# Patient Record
Sex: MALE | Race: WHITE | URBAN - METROPOLITAN AREA
[De-identification: names, ages, dates, MRNs, and addresses within clinical notes are randomized per-mention and may not be internally consistent; named-entity substitution may affect disease eponyms.]

---

## 2019-07-10 ENCOUNTER — EMERGENCY (EMERGENCY)
Facility: HOSPITAL | Age: 49
LOS: 0 days | Discharge: HOME | End: 2019-07-10
Attending: EMERGENCY MEDICINE | Admitting: EMERGENCY MEDICINE
Payer: MEDICAID

## 2019-07-10 VITALS
OXYGEN SATURATION: 98 % | SYSTOLIC BLOOD PRESSURE: 111 MMHG | TEMPERATURE: 98 F | RESPIRATION RATE: 20 BRPM | DIASTOLIC BLOOD PRESSURE: 64 MMHG | HEART RATE: 100 BPM

## 2019-07-10 VITALS — OXYGEN SATURATION: 99 % | RESPIRATION RATE: 20 BRPM
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
## 2019-07-10 DIAGNOSIS — R07.89 OTHER CHEST PAIN: ICD-10-CM
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
## 2019-07-10 DIAGNOSIS — Z76.5 MALINGERER [CONSCIOUS SIMULATION]: ICD-10-CM

## 2019-07-10 DIAGNOSIS — R07.9 CHEST PAIN, UNSPECIFIED: ICD-10-CM

## 2019-07-10 LAB
ANION GAP SERPL CALC-SCNC: 11 MMOL/L — SIGNIFICANT CHANGE UP (ref 7–14)
BASOPHILS # BLD AUTO: 0.03 K/UL — SIGNIFICANT CHANGE UP (ref 0–0.2)
BASOPHILS NFR BLD AUTO: 0.4 % — SIGNIFICANT CHANGE UP (ref 0–1)
BUN SERPL-MCNC: 14 MG/DL — SIGNIFICANT CHANGE UP (ref 10–20)
CALCIUM SERPL-MCNC: 9.4 MG/DL — SIGNIFICANT CHANGE UP (ref 8.5–10.1)
CHLORIDE SERPL-SCNC: 104 MMOL/L — SIGNIFICANT CHANGE UP (ref 98–110)
CO2 SERPL-SCNC: 26 MMOL/L — SIGNIFICANT CHANGE UP (ref 17–32)
CREAT SERPL-MCNC: 1 MG/DL — SIGNIFICANT CHANGE UP (ref 0.7–1.5)
EOSINOPHIL # BLD AUTO: 0.25 K/UL — SIGNIFICANT CHANGE UP (ref 0–0.7)
EOSINOPHIL NFR BLD AUTO: 3.4 % — SIGNIFICANT CHANGE UP (ref 0–8)
GLUCOSE SERPL-MCNC: 97 MG/DL — SIGNIFICANT CHANGE UP (ref 70–99)
HCT VFR BLD CALC: 41.8 % — LOW (ref 42–52)
HGB BLD-MCNC: 14.5 G/DL — SIGNIFICANT CHANGE UP (ref 14–18)
IMM GRANULOCYTES NFR BLD AUTO: 0.4 % — HIGH (ref 0.1–0.3)
LYMPHOCYTES # BLD AUTO: 1.59 K/UL — SIGNIFICANT CHANGE UP (ref 1.2–3.4)
LYMPHOCYTES # BLD AUTO: 21.8 % — SIGNIFICANT CHANGE UP (ref 20.5–51.1)
MCHC RBC-ENTMCNC: 31.8 PG — HIGH (ref 27–31)
MCHC RBC-ENTMCNC: 34.7 G/DL — SIGNIFICANT CHANGE UP (ref 32–37)
MCV RBC AUTO: 91.7 FL — SIGNIFICANT CHANGE UP (ref 80–94)
MONOCYTES # BLD AUTO: 0.7 K/UL — HIGH (ref 0.1–0.6)
MONOCYTES NFR BLD AUTO: 9.6 % — HIGH (ref 1.7–9.3)
NEUTROPHILS # BLD AUTO: 4.71 K/UL — SIGNIFICANT CHANGE UP (ref 1.4–6.5)
NEUTROPHILS NFR BLD AUTO: 64.4 % — SIGNIFICANT CHANGE UP (ref 42.2–75.2)
NRBC # BLD: 0 /100 WBCS — SIGNIFICANT CHANGE UP (ref 0–0)
PLATELET # BLD AUTO: 147 K/UL — SIGNIFICANT CHANGE UP (ref 130–400)
POTASSIUM SERPL-MCNC: 3.8 MMOL/L — SIGNIFICANT CHANGE UP (ref 3.5–5)
POTASSIUM SERPL-SCNC: 3.8 MMOL/L — SIGNIFICANT CHANGE UP (ref 3.5–5)
RBC # BLD: 4.56 M/UL — LOW (ref 4.7–6.1)
RBC # FLD: 13.3 % — SIGNIFICANT CHANGE UP (ref 11.5–14.5)
SODIUM SERPL-SCNC: 141 MMOL/L — SIGNIFICANT CHANGE UP (ref 135–146)
TROPONIN T SERPL-MCNC: <0.01 NG/ML — SIGNIFICANT CHANGE UP
WBC # BLD: 7.31 K/UL — SIGNIFICANT CHANGE UP (ref 4.8–10.8)
WBC # FLD AUTO: 7.31 K/UL — SIGNIFICANT CHANGE UP (ref 4.8–10.8)

## 2019-07-10 PROCEDURE — 71260 CT THORAX DX C+: CPT | Mod: 26

## 2019-07-10 PROCEDURE — 99285 EMERGENCY DEPT VISIT HI MDM: CPT

## 2019-07-10 PROCEDURE — 93010 ELECTROCARDIOGRAM REPORT: CPT

## 2019-07-10 RX ORDER — OXYCODONE AND ACETAMINOPHEN 5; 325 MG/1; MG/1
1 TABLET ORAL ONCE
Refills: 0 | Status: DISCONTINUED | OUTPATIENT
Start: 2019-07-10 | End: 2019-07-10

## 2019-07-10 RX ORDER — MORPHINE SULFATE 50 MG/1
4 CAPSULE, EXTENDED RELEASE ORAL ONCE
Refills: 0 | Status: DISCONTINUED | OUTPATIENT
Start: 2019-07-10 | End: 2019-07-10

## 2019-07-10 RX ADMIN — MORPHINE SULFATE 4 MILLIGRAM(S): 50 CAPSULE, EXTENDED RELEASE ORAL at 18:45

## 2019-07-10 NOTE — ED PROVIDER NOTE - CARE PLAN
Principal Discharge DX:	Chest wall pain Principal Discharge DX:	Chest wall pain  Secondary Diagnosis:	Drug-seeking behavior
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
## 2019-07-10 NOTE — ED PROVIDER NOTE - NSFOLLOWUPINSTRUCTIONS_ED_ALL_ED_FT
Patient to be discharged from ED. Any available test results were discussed with patient and/or family. Verbal instructions given, including instructions to return to ED immediately for any new, worsening, or concerning symptoms. Patient endorsed understanding. Written discharge instructions additionally given, including follow-up plan.    Chest Wall Pain    Chest wall pain is pain in or around the bones and muscles of your chest. Sometimes, an injury causes this pain. Sometimes, the cause may not be known. This pain may take several weeks or longer to get better.     HOME CARE INSTRUCTIONS  Pay attention to any changes in your symptoms. Take these actions to help with your pain:     Rest as told by your health care provider.    Avoid activities that cause pain. These include any activities that use your chest muscles or your abdominal and side muscles to lift heavy items.     If directed, apply ice to the painful area:  Put ice in a plastic bag.  Place a towel between your skin and the bag.  Leave the ice on for 20 minutes, 2–3 times per day.  Take over-the-counter and prescription medicines only as told by your health care provider.  Do not use tobacco products, including cigarettes, chewing tobacco, and e-cigarettes. If you need help quitting, ask your health care provider.  Keep all follow-up visits as told by your health care provider. This is important.    SEEK MEDICAL CARE IF:  You have a fever.  Your chest pain becomes worse.  You have new symptoms.    SEEK IMMEDIATE MEDICAL CARE IF:  You have nausea or vomiting.  You feel sweaty or light-headed.  You have a cough with phlegm (sputum) or you cough up blood.  You develop shortness of breath.    ADDITIONAL NOTES AND INSTRUCTIONS    Please follow up with your Primary MD in 24-48 hr.  Seek immediate medical care for any new/worsening signs or symptoms.
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
## 2019-07-10 NOTE — ED PROVIDER NOTE - NS ED ROS FT
Constitutional: See HPI. No fever/chills.  Eyes: No visual changes, eye pain or discharge.  ENT: No hearing changes, pain, discharge or infections.  Neck: No neck pain or stiffness.  Cardiac:+ chest pain, sob with exertion  Respiratory: No cough or respiratory distress. No hemoptysis.   GI: No nausea, vomiting, diarrhea or abdominal pain.  : No dysuria, frequency or burning.   MS: No myalgia, muscle weakness, joint pain or back pain.  Neuro: No headache or weakness. No LOC.  Skin: No rash.  Except as documented in the HPI, all other systems are negative.

## 2019-07-10 NOTE — ED PROVIDER NOTE - PHYSICAL EXAMINATION
VITAL SIGNS: I have reviewed nursing notes and confirm.  CONSTITUTIONAL: Well-developed; well-nourished; in no acute distress.  SKIN: Skin exam is warm and dry, no acute rash.  HEAD: Normocephalic; atraumatic.  NECK: Supple; non tender.  CARD: S1, S2 normal; no murmurs, gallops, or rubs. Regular rate and rhythm. + anterior /sternal chest wall ttp   RESP: No wheezes, rales or rhonchi.  ABD: Normal bowel sounds; soft; non-distended; non-tender  EXT: Normal ROM. No clubbing, cyanosis or edema.  LYMPH: No acute cervical adenopathy.  NEURO: Alert, oriented. Grossly unremarkable. No focal deficits.  PSYCH: Cooperative, appropriate.

## 2019-07-10 NOTE — ED PROVIDER NOTE - NSFOLLOWUPCLINICS_GEN_ALL_ED_FT
[facility]  Medicine  [address], [place]   Phone: [phone]  Fax:   Follow Up Time:

## 2019-07-11 ENCOUNTER — EMERGENCY (EMERGENCY)
Facility: HOSPITAL | Age: 49
LOS: 0 days | Discharge: HOME | End: 2019-07-12
Attending: EMERGENCY MEDICINE | Admitting: EMERGENCY MEDICINE
Payer: MEDICAID

## 2019-07-11 VITALS
TEMPERATURE: 96 F | DIASTOLIC BLOOD PRESSURE: 81 MMHG | SYSTOLIC BLOOD PRESSURE: 127 MMHG | RESPIRATION RATE: 18 BRPM | HEART RATE: 97 BPM | OXYGEN SATURATION: 100 %

## 2019-07-11 DIAGNOSIS — R44.0 AUDITORY HALLUCINATIONS: ICD-10-CM

## 2019-07-11 DIAGNOSIS — F60.2 ANTISOCIAL PERSONALITY DISORDER: ICD-10-CM

## 2019-07-11 PROBLEM — Z00.00 ENCOUNTER FOR PREVENTIVE HEALTH EXAMINATION: Status: ACTIVE | Noted: 2019-07-11

## 2019-07-11 PROCEDURE — 99284 EMERGENCY DEPT VISIT MOD MDM: CPT

## 2019-07-11 NOTE — ED ADULT TRIAGE NOTE - CHIEF COMPLAINT QUOTE
"I'm Schizophrenia and Bipolar. I see the light and talk to it. I hear voices too." denies any homicidal/suicidal ideation. denies any drugs use.

## 2019-07-12 DIAGNOSIS — F60.2 ANTISOCIAL PERSONALITY DISORDER: ICD-10-CM

## 2019-07-12 LAB
ANION GAP SERPL CALC-SCNC: 15 MMOL/L — HIGH (ref 7–14)
APAP SERPL-MCNC: <5 UG/ML — LOW (ref 10–30)
BASOPHILS # BLD AUTO: 0.04 K/UL — SIGNIFICANT CHANGE UP (ref 0–0.2)
BASOPHILS NFR BLD AUTO: 0.6 % — SIGNIFICANT CHANGE UP (ref 0–1)
BUN SERPL-MCNC: 11 MG/DL — SIGNIFICANT CHANGE UP (ref 10–20)
CALCIUM SERPL-MCNC: 9.2 MG/DL — SIGNIFICANT CHANGE UP (ref 8.5–10.1)
CHLORIDE SERPL-SCNC: 105 MMOL/L — SIGNIFICANT CHANGE UP (ref 98–110)
CO2 SERPL-SCNC: 23 MMOL/L — SIGNIFICANT CHANGE UP (ref 17–32)
CREAT SERPL-MCNC: 1 MG/DL — SIGNIFICANT CHANGE UP (ref 0.7–1.5)
EOSINOPHIL # BLD AUTO: 0.46 K/UL — SIGNIFICANT CHANGE UP (ref 0–0.7)
EOSINOPHIL NFR BLD AUTO: 6.6 % — SIGNIFICANT CHANGE UP (ref 0–8)
ETHANOL SERPL-MCNC: 120 MG/DL — HIGH
GLUCOSE SERPL-MCNC: 78 MG/DL — SIGNIFICANT CHANGE UP (ref 70–99)
HCT VFR BLD CALC: 39.9 % — LOW (ref 42–52)
HGB BLD-MCNC: 14.1 G/DL — SIGNIFICANT CHANGE UP (ref 14–18)
IMM GRANULOCYTES NFR BLD AUTO: 0.3 % — SIGNIFICANT CHANGE UP (ref 0.1–0.3)
LYMPHOCYTES # BLD AUTO: 2 K/UL — SIGNIFICANT CHANGE UP (ref 1.2–3.4)
LYMPHOCYTES # BLD AUTO: 28.7 % — SIGNIFICANT CHANGE UP (ref 20.5–51.1)
MCHC RBC-ENTMCNC: 32.3 PG — HIGH (ref 27–31)
MCHC RBC-ENTMCNC: 35.3 G/DL — SIGNIFICANT CHANGE UP (ref 32–37)
MCV RBC AUTO: 91.5 FL — SIGNIFICANT CHANGE UP (ref 80–94)
MONOCYTES # BLD AUTO: 0.58 K/UL — SIGNIFICANT CHANGE UP (ref 0.1–0.6)
MONOCYTES NFR BLD AUTO: 8.3 % — SIGNIFICANT CHANGE UP (ref 1.7–9.3)
NEUTROPHILS # BLD AUTO: 3.88 K/UL — SIGNIFICANT CHANGE UP (ref 1.4–6.5)
NEUTROPHILS NFR BLD AUTO: 55.5 % — SIGNIFICANT CHANGE UP (ref 42.2–75.2)
NRBC # BLD: 0 /100 WBCS — SIGNIFICANT CHANGE UP (ref 0–0)
PLATELET # BLD AUTO: 148 K/UL — SIGNIFICANT CHANGE UP (ref 130–400)
POTASSIUM SERPL-MCNC: 3.3 MMOL/L — LOW (ref 3.5–5)
POTASSIUM SERPL-SCNC: 3.3 MMOL/L — LOW (ref 3.5–5)
RBC # BLD: 4.36 M/UL — LOW (ref 4.7–6.1)
RBC # FLD: 13.2 % — SIGNIFICANT CHANGE UP (ref 11.5–14.5)
SALICYLATES SERPL-MCNC: <0.3 MG/DL — LOW (ref 4–30)
SODIUM SERPL-SCNC: 143 MMOL/L — SIGNIFICANT CHANGE UP (ref 135–146)
WBC # BLD: 6.98 K/UL — SIGNIFICANT CHANGE UP (ref 4.8–10.8)
WBC # FLD AUTO: 6.98 K/UL — SIGNIFICANT CHANGE UP (ref 4.8–10.8)

## 2019-07-12 PROCEDURE — 93010 ELECTROCARDIOGRAM REPORT: CPT

## 2019-07-12 PROCEDURE — 90792 PSYCH DIAG EVAL W/MED SRVCS: CPT | Mod: GT
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
## 2019-07-12 NOTE — ED ADULT NURSE NOTE - OBJECTIVE STATEMENT
Pt is complaining of hearing voices and being paranoid. Pt has a past medical history of schizophrenia.  Denies suicidal or homicidal ideations.
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
## 2019-07-12 NOTE — ED BEHAVIORAL HEALTH ASSESSMENT NOTE - CURRENT MEDICATION
Received injection a few days ago, thinks it's once a week Received injection a few days ago, thinks it's once a week or a month

## 2019-07-12 NOTE — ED PROVIDER NOTE - OBJECTIVE STATEMENT
48 yold male to Ed Pmhx schizophrenia, asthma c/o hearing voices, paranoid ideations - believes someone is trying to get him; pt admits to prior hospitalizations - last admission in [place] 1 [place] ago; currently denies sucidal ideations, homicidal ideations ideations; pt currently lives with sister - denies drug use currently(last used 20 yrs ago); pt admits to drinking a few beers; pt denies headache, n/v, fever,chills, abdominal pain/back pain

## 2019-07-12 NOTE — ED PROVIDER NOTE - PROGRESS NOTE DETAILS
case d/w telepsch resident - will place pt in list for interview; I spoke with tele psychiatrist Dr. Soni? She states that she evaluated patient and believes pt does not require inpatient management. States that pt's story has a lot of inconsistencies and pt might have a malingering component. Pt to be d/brandon with outpt psychiatric follow up.
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
## 2019-07-12 NOTE — ED PROVIDER NOTE - CLINICAL SUMMARY MEDICAL DECISION MAKING FREE TEXT BOX
Pt with hx of schizophrenia and bipolar presents with complaints of hearing voices. No SI/HI. Medically cleared and evaluated by psychiatrist and psychiatrically cleared. Plan is d/c with outpt f/up.
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
## 2019-07-12 NOTE — ED BEHAVIORAL HEALTH ASSESSMENT NOTE - OTHER
Web Search n/a superficially cooperative, defensive, guarded deferred swaying, picking at skin "I don't know" vague, contradicts himself, withholding information

## 2019-07-12 NOTE — ED PROVIDER NOTE - PHYSICAL EXAMINATION
Constitutional: Well developed, well nourished. NAD  Head: Normocephalic, atraumatic.  Eyes: PERRL, EOMI.  ENT: No nasal discharge. Mucous membranes dry.  Neck: Supple. Painless ROM.  Cardiovascular:  Regular rate and rhythm.   Pulmonary:  Lungs clear to auscultation bilaterally.   Abdominal: Soft. Nondistended. No rebound, guarding, rigidity.  Extremities. Pelvis stable. No lower extremity edema, symmetric calves.  Skin: No rashes, cyanosis.  Neuro: AAOx3. No focal neurological deficits.  Psych: pt alert and oriented; preoccupied with internal stimuli; + auditory hallucinations as per pt;
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
## 2019-07-12 NOTE — ED PROVIDER NOTE - ATTENDING CONTRIBUTION TO CARE
I personally evaluated the patient. I reviewed the Resident’s or Physician Assistant’s note (as assigned above), and agree with the findings and plan except as documented in my note.  Pt with hx of schizophrenia and bipolar d/o presents with complaints of hearing voices. States that it has been ongoing "for a while". States that he lived in [place] and moved to [place] 3 months ago and is living with his sister. The voices are not directing him to do anything. Denies SI/HI. No medical complaints. States that he drank  some alcohol and is denying drug use. VS reviewed, pt well appearing, NAD. Head ncat, talking in full sentences, neck supple, no JVD, normal s1s2 without any murmurs, Lungs CTAB with normal work of breathing. abd +BS, s/nd/nt, extremities wnl, AAO x 3, affect normal, no acute findings. Will send labs, consult psych and dispo accordingly.

## 2019-07-12 NOTE — ED PROVIDER NOTE - NSFOLLOWUPINSTRUCTIONS_ED_ALL_ED_FT
Follow up at the Mental Health clinic within 5 days.     Living With Schizophrenia    If you have been diagnosed with schizophrenia, you may be relieved to know why you have felt or behaved a certain way. Still, you may have questions about the treatment ahead, how to get the support you need, and how to deal with the condition day-to-day. With care and support, you can learn to manage your symptoms and live with schizophrenia.    How to manage lifestyle changes  Managing stress     ImageStress is your body's reaction to life changes and events, both good and bad. For people with schizophrenia, stress can sometimes cause an episode to start (can be a trigger), so it is important to learn ways to deal with stress. Your health care provider, therapist, or counselor may suggest some techniques such as:  Meditation, muscle relaxation, and breathing exercises.  Music therapy. This can include creating music or listening to music.  Life skills training. This training is focused on work, self-care, money, house management, and social skills.  Other things you can do to cope with stress include:  Keeping a stress diary. This can help you learn what causes your stress to start and how you can control your response to those triggers.  Exercising. Even a short daily walk can help.  Getting enough sleep.  Making a schedule to manage your time. Knowing what you will do from day to day helps you avoid feeling overwhelmed by tasks and deadlines.  Spending time on hobbies you enjoy that help you relax.  Medicines     Antipsychotic medicines are usually prescribed to help manage this condition. Make sure you:  Talk with your pharmacist or health care provider about all medicines that you take, the possible side effects, and which medicines are safe to take together.  Make it your goal to take part in all treatment decisions (shared decision-making). Ask about possible side effects of medicines that your health care provider recommends, and tell him or her how you feel about having those side effects. It is best if shared decision-making with your health care provider is part of your total treatment plan.  Relationships    Having the support of your family and friends can play a major role in the success of your treatment. The following steps can help you maintain healthy relationships:  Think about going to couples therapy, family therapy, or family education classes.  Create a written plan for your treatment, and include close family members and friends in the process.  Consider bringing your partner or another family member or friend to the appointments you have with your health care provider.  How to recognize changes in your condition  If you find that your condition is getting worse, talk to your health care provider right away. Watch for these signs:  You hear, see, taste, and things that others do not (hallucinate).  You cannot set aside persistent, unusual thoughts or beliefs, no matter what others may believe.  Your speech becomes unclear.  You withdraw from friends and family members.   You have racing thoughts and have trouble thinking clearly or staying focused.  You have poor personal hygiene, weight gain or weight loss, or changes in how you are sleeping or eating.  Where to find support  Talking to others     Reach out to trusted friends or family members, explain your condition, and let them know that you are working with a health care team.  Consider giving educational materials to friends and family.  If you are having trouble telling your friends and family about your condition, keep in mind that honest and open communication can make these conversations easier.  Finances     Be sure to check with your insurance carrier to find out what treatment options are covered by your plan. You may also be able to find financial assistance through not-for-profit organizations or with local government-based resources.    If you are taking medicines, you may be able to get the generic form, which may be less expensive than brand-name medicine. Some makers of prescription medicines also offer help to patients who cannot afford the medicines that they need.    Therapy and support groups     Make sure you find a counselor or therapist who is familiar with schizophrenia. Meet with your counselor or therapist once a week or more often if needed.  Find support programs for people with schizophrenia, such as local groups associated with the National [employer] (SUSANA). You can begin your search here: www.susana.org  Follow these instructions at home:  Take over-the-counter and prescription medicines only as told by your health care provider. Do not start new medicines or stop taking medicines before you ask your health care provider if it is safe to make those changes.  Avoid caffeine, alcohol, and drugs. They can affect how your medicine works and can make your symptoms worse.  Eat a healthy diet.  Keep all follow-up visits as told by your health care provider, therapist, or counselor. This is important.  Look for support groups in your area so you can meet other people with your condition and learn new coping methods.  Contact a health care provider if:  You are not able to take your medicines as prescribed.  Your symptoms get worse.  Get help right away if:  You have serious thoughts about hurting yourself or others.  If you ever feel like you may hurt yourself or others, or have thoughts about taking your own life, get help right away. You can go to your nearest emergency department or call:   Your local emergency services (911 in the [place]   A suicide crisis helpline, such as the National Suicide Prevention Lifeline at 1-865.700.8723. This is open 24 hours a day.   Summary  Schizophrenia is a lifelong illness. It is best controlled with continuous treatment that includes medicine and therapy.  Learning ways to deal with stress may help your treatment work better.  Having the support of your family and friends can help to make your treatment a success.  If you find that your condition is getting worse, talk to your health care provider right away.  This information is not intended to replace advice given to you by your health care provider. Make sure you discuss any questions you have with your health care provider.

## 2019-07-12 NOTE — ED BEHAVIORAL HEALTH NOTE - BEHAVIORAL HEALTH NOTE
Consult requested by EM Attending Dr. Menendez at 01:48. Telepsychiatry attempted to consult at 2:50 but unable to initiate due to patient not yet being in private room. ED staff instructed to notify telepsychiatry when patient is placed in private room.

## 2019-07-12 NOTE — ED BEHAVIORAL HEALTH ASSESSMENT NOTE - DESCRIPTION (FIRST USE, LAST USE, QUANTITY, FREQUENCY, DURATION)
once in a while, had a few beers yesterday back in the day per patient "back in the day" per patient

## 2019-08-01 ENCOUNTER — INPATIENT (INPATIENT)
Facility: HOSPITAL | Age: 49
LOS: 7 days | Discharge: HOME | End: 2019-08-09
Attending: PSYCHIATRY & NEUROLOGY | Admitting: PSYCHIATRY & NEUROLOGY
Payer: MEDICAID

## 2019-08-01 ENCOUNTER — OUTPATIENT (OUTPATIENT)
Dept: OUTPATIENT SERVICES | Facility: HOSPITAL | Age: 49
LOS: 1 days | End: 2019-08-01
Payer: MEDICAID

## 2019-08-01 VITALS
SYSTOLIC BLOOD PRESSURE: 108 MMHG | TEMPERATURE: 97 F | HEART RATE: 96 BPM | DIASTOLIC BLOOD PRESSURE: 71 MMHG | OXYGEN SATURATION: 99 % | RESPIRATION RATE: 18 BRPM

## 2019-08-01 DIAGNOSIS — Z98.890 OTHER SPECIFIED POSTPROCEDURAL STATES: Chronic | ICD-10-CM

## 2019-08-01 DIAGNOSIS — F39 UNSPECIFIED MOOD [AFFECTIVE] DISORDER: ICD-10-CM

## 2019-08-01 PROBLEM — F20.9 SCHIZOPHRENIA, UNSPECIFIED: Chronic | Status: ACTIVE | Noted: 2019-07-12

## 2019-08-01 LAB
ALBUMIN SERPL ELPH-MCNC: 4.1 G/DL — SIGNIFICANT CHANGE UP (ref 3.5–5.2)
ALP SERPL-CCNC: 18 U/L — LOW (ref 30–115)
ALT FLD-CCNC: 11 U/L — SIGNIFICANT CHANGE UP (ref 0–41)
ANION GAP SERPL CALC-SCNC: 22 MMOL/L — HIGH (ref 7–14)
APAP SERPL-MCNC: <5 UG/ML — LOW (ref 10–30)
AST SERPL-CCNC: 30 U/L — SIGNIFICANT CHANGE UP (ref 0–41)
BASE EXCESS BLDV CALC-SCNC: 4 MMOL/L — HIGH (ref -2–2)
BASOPHILS # BLD AUTO: 0.07 K/UL — SIGNIFICANT CHANGE UP (ref 0–0.2)
BASOPHILS NFR BLD AUTO: 1.1 % — HIGH (ref 0–1)
BILIRUB SERPL-MCNC: 0.4 MG/DL — SIGNIFICANT CHANGE UP (ref 0.2–1.2)
BUN SERPL-MCNC: 12 MG/DL — SIGNIFICANT CHANGE UP (ref 10–20)
CA-I SERPL-SCNC: 1.2 MMOL/L — SIGNIFICANT CHANGE UP (ref 1.12–1.3)
CALCIUM SERPL-MCNC: <0.8 MG/DL — CRITICAL LOW (ref 8.5–10.1)
CHLORIDE SERPL-SCNC: 97 MMOL/L — LOW (ref 98–110)
CO2 SERPL-SCNC: 18 MMOL/L — SIGNIFICANT CHANGE UP (ref 17–32)
CREAT SERPL-MCNC: 1.1 MG/DL — SIGNIFICANT CHANGE UP (ref 0.7–1.5)
EOSINOPHIL # BLD AUTO: 0.46 K/UL — SIGNIFICANT CHANGE UP (ref 0–0.7)
EOSINOPHIL NFR BLD AUTO: 7.3 % — SIGNIFICANT CHANGE UP (ref 0–8)
ETHANOL SERPL-MCNC: <10 MG/DL — SIGNIFICANT CHANGE UP
GAS PNL BLDV: 138 MMOL/L — SIGNIFICANT CHANGE UP (ref 136–145)
GAS PNL BLDV: SIGNIFICANT CHANGE UP
GLUCOSE SERPL-MCNC: 99 MG/DL — SIGNIFICANT CHANGE UP (ref 70–99)
HCO3 BLDV-SCNC: 30 MMOL/L — HIGH (ref 22–29)
HCT VFR BLD CALC: 43 % — SIGNIFICANT CHANGE UP (ref 42–52)
HCT VFR BLDA CALC: 48.3 % — HIGH (ref 34–44)
HGB BLD CALC-MCNC: 15.8 G/DL — SIGNIFICANT CHANGE UP (ref 14–18)
HGB BLD-MCNC: 15.1 G/DL — SIGNIFICANT CHANGE UP (ref 14–18)
IMM GRANULOCYTES NFR BLD AUTO: 0.5 % — HIGH (ref 0.1–0.3)
LACTATE BLDV-MCNC: 1.4 MMOL/L — SIGNIFICANT CHANGE UP (ref 0.5–1.6)
LYMPHOCYTES # BLD AUTO: 1.94 K/UL — SIGNIFICANT CHANGE UP (ref 1.2–3.4)
LYMPHOCYTES # BLD AUTO: 30.9 % — SIGNIFICANT CHANGE UP (ref 20.5–51.1)
MCHC RBC-ENTMCNC: 31.9 PG — HIGH (ref 27–31)
MCHC RBC-ENTMCNC: 35.1 G/DL — SIGNIFICANT CHANGE UP (ref 32–37)
MCV RBC AUTO: 90.9 FL — SIGNIFICANT CHANGE UP (ref 80–94)
MONOCYTES # BLD AUTO: 0.57 K/UL — SIGNIFICANT CHANGE UP (ref 0.1–0.6)
MONOCYTES NFR BLD AUTO: 9.1 % — SIGNIFICANT CHANGE UP (ref 1.7–9.3)
NEUTROPHILS # BLD AUTO: 3.2 K/UL — SIGNIFICANT CHANGE UP (ref 1.4–6.5)
NEUTROPHILS NFR BLD AUTO: 51.1 % — SIGNIFICANT CHANGE UP (ref 42.2–75.2)
NRBC # BLD: 0 /100 WBCS — SIGNIFICANT CHANGE UP (ref 0–0)
PCO2 BLDV: 50 MMHG — SIGNIFICANT CHANGE UP (ref 41–51)
PH BLDV: 7.39 — SIGNIFICANT CHANGE UP (ref 7.26–7.43)
PLATELET # BLD AUTO: 151 K/UL — SIGNIFICANT CHANGE UP (ref 130–400)
PO2 BLDV: 39 MMHG — SIGNIFICANT CHANGE UP (ref 20–40)
POTASSIUM BLDV-SCNC: 3.9 MMOL/L — SIGNIFICANT CHANGE UP (ref 3.3–5.6)
POTASSIUM SERPL-MCNC: >10 MMOL/L — CRITICAL HIGH (ref 3.5–5)
POTASSIUM SERPL-SCNC: >10 MMOL/L — CRITICAL HIGH (ref 3.5–5)
PROT SERPL-MCNC: 7.1 G/DL — SIGNIFICANT CHANGE UP (ref 6–8)
RBC # BLD: 4.73 M/UL — SIGNIFICANT CHANGE UP (ref 4.7–6.1)
RBC # FLD: 12.7 % — SIGNIFICANT CHANGE UP (ref 11.5–14.5)
SALICYLATES SERPL-MCNC: <0.3 MG/DL — LOW (ref 4–30)
SAO2 % BLDV: 75 % — SIGNIFICANT CHANGE UP
SODIUM SERPL-SCNC: 137 MMOL/L — SIGNIFICANT CHANGE UP (ref 135–146)
WBC # BLD: 6.27 K/UL — SIGNIFICANT CHANGE UP (ref 4.8–10.8)
WBC # FLD AUTO: 6.27 K/UL — SIGNIFICANT CHANGE UP (ref 4.8–10.8)

## 2019-08-01 PROCEDURE — G9001: CPT

## 2019-08-01 PROCEDURE — 93010 ELECTROCARDIOGRAM REPORT: CPT

## 2019-08-01 PROCEDURE — 99285 EMERGENCY DEPT VISIT HI MDM: CPT

## 2019-08-01 RX ORDER — ALBUTEROL 90 UG/1
2 AEROSOL, METERED ORAL EVERY 6 HOURS
Refills: 0 | Status: DISCONTINUED | OUTPATIENT
Start: 2019-08-02 | End: 2019-08-09

## 2019-08-01 RX ORDER — TRAMADOL HYDROCHLORIDE 50 MG/1
50 TABLET ORAL ONCE
Refills: 0 | Status: DISCONTINUED | OUTPATIENT
Start: 2019-08-01 | End: 2019-08-01

## 2019-08-01 RX ORDER — HALOPERIDOL DECANOATE 100 MG/ML
5 INJECTION INTRAMUSCULAR EVERY 6 HOURS
Refills: 0 | Status: DISCONTINUED | OUTPATIENT
Start: 2019-08-02 | End: 2019-08-09

## 2019-08-01 RX ADMIN — TRAMADOL HYDROCHLORIDE 50 MILLIGRAM(S): 50 TABLET ORAL at 18:37

## 2019-08-01 RX ADMIN — TRAMADOL HYDROCHLORIDE 50 MILLIGRAM(S): 50 TABLET ORAL at 17:16

## 2019-08-01 RX ADMIN — TRAMADOL HYDROCHLORIDE 50 MILLIGRAM(S): 50 TABLET ORAL at 21:29

## 2019-08-01 RX ADMIN — TRAMADOL HYDROCHLORIDE 50 MILLIGRAM(S): 50 TABLET ORAL at 16:51

## 2019-08-01 NOTE — ED PROVIDER NOTE - PHYSICAL EXAMINATION
CONSTITUTIONAL: Well-developed; well-nourished; in no acute distress.   SKIN: warm, dry  HEAD: Normocephalic; atraumatic.  EYES: no conjunctival injection. PERRL.   ENT: No nasal discharge; airway clear.  NECK: Supple; non tender.  CARD: S1, S2 normal; no murmurs, gallops, or rubs. Regular rate and rhythm.   RESP: No wheezes, rales or rhonchi.  ABD: soft ntnd  EXT: Normal ROM.  No clubbing, cyanosis or edema.   LYMPH: No acute cervical adenopathy.  NEURO: Alert, oriented, grossly unremarkable. Affect flat.   PSYCH: Cooperative, appropriate.

## 2019-08-01 NOTE — ED ADULT NURSE NOTE - PAIN: RADIATION
states he has titam in his hip denies having any falls states he has tittatium in his hip denies having any falls c/o of chronic pain

## 2019-08-01 NOTE — ED ADULT TRIAGE NOTE - CHIEF COMPLAINT QUOTE
"last night I started feeling depressed and having suicidal thoughts." pt states he has a plan "last night I started feeling depressed and having suicidal thoughts." pt states he has a plan. pt refusing to talk to me states he wants to speak to a doctor. pt states he is not having hallucinations but is staring off like he is looking at something and looking at his arm as if something is in it

## 2019-08-01 NOTE — ED PROVIDER NOTE - OBJECTIVE STATEMENT
49 y/o male with PMH of antisocial personality disorder, schizoaffective disorder who presents to ED for SI. PT states he has thought of many plans to harm himself, has not tried anything. History of suicide attempt many years ago trying to hang himself. Reports he also sees "things." but will not elaborate further. Patient denies HI, denies any self-harm attempt or OD, denies any other substance abuse/misuse, and denies Audio hallucinations.

## 2019-08-01 NOTE — ED BEHAVIORAL HEALTH ASSESSMENT NOTE - HPI (INCLUDE ILLNESS QUALITY, SEVERITY, DURATION, TIMING, CONTEXT, MODIFYING FACTORS, ASSOCIATED SIGNS AND SYMPTOMS)
48M , domiciled with sister, unemployed on SSI, with as per chart review antisocial personality d/o and selfed reported past psychiatric h/o schizoaffective d/o bipolar type, OCD, PTSD, 4 past psychiatric state hospitalizations (most recent 2017 x 3 weeks),  no past SA, h/o cutting behavior, and no past substance use h/o presenting to the ED with suicidal ideations. Pt was most recently evaluated by Psychiatry in the ED on 7/12 for psychosis 48M , domiciled with sister, unemployed on SSI, with as per chart review antisocial personality d/o and self reported past psychiatric h/o schizoaffective d/o bipolar type, OCD, PTSD, 4 past psychiatric state hospitalizations (most recent 2017 x 3 weeks),  no past SA, h/o cutting behavior, and no past substance use h/o presenting to the ED with suicidal ideations. Pt was most recently evaluated by Psychiatry in the ED on 7/12 for psychosis, patient was not admitted to [facility] as patient's history and presenting symptoms were inconsistent,vague there was high suspicion of malingering. 48M , domiciled with sister, unemployed on SSI, as per chart review psychiatric history of antisocial personality d/o and self reported past psychiatric h/o schizoaffective d/o bipolar type, OCD, PTSD, 4 past psychiatric state hospitalizations (most recent 2017 in [place] x 3 weeks),  extensive legal history with most recent incarceration in [place] (released April 2019), no past SA, h/o cutting behavior, and no reported substance use h/o presenting to the ED with suicidal ideations. Pt was most recently evaluated by Psychiatry in the ED on 7/12 for psychosis, and was not admitted to [facility] as patient's history and presenting symptoms were inconsistent, vague and there was high suspicion of malingering.    Upon approach, patient calm, cooperative, sitting up in chair, wearing hospital gown. Pt presents with poor eye contact and constricted affect. He states that he has been having suicidal ideations x 1 week, that are increasing in frequency, occurring daily. Pt with plan of jumping of a "tall building" in [place], however cannot specify a building. Pt can not provide a trigger or specific event for worsening depressed mood, however states that his depression started shortly after his release from jail in 2019. Pt reports poor family support, stating that he has been estranged from his family since being released from jail. He admits to poor concentration, poor sleep, feelings of worthlessness, decreased energy. Pt also reports paranoid delusions of "people poisoning mean" and admits to visual hallucination. Pt states that these visual hallucination are not bothersome and that "I talk to them but they don't talk back."  Pt states that he is currently seeing a psychiatrist however when asked to provide information, gives card for "[facility]" 48M , domiciled with sister, unemployed on SSI, as per chart review psychiatric history of antisocial personality d/o and self reported past psychiatric h/o schizoaffective d/o bipolar type, OCD, PTSD, 4 past psychiatric state hospitalizations (most recent 2017 in [place] x 3 weeks),  extensive legal history with most recent incarceration in [place] (released April 2019), no past SA, h/o cutting behavior, and no reported substance use h/o (however per chart review prior cocaine use) presenting to the ED for suicidal ideations. Pt was most recently evaluated by Psychiatry in the ED on 7/12 for psychosis, and was not admitted to [facility] as patient's history and presenting symptoms were inconsistent, vague and there was high suspicion for malingering.    Upon approach, patient calm, cooperative, sitting up in chair, wearing hospital gown. Pt is a poor historian, frequently providing vague answers stating "I don't know". He presents with poor eye contact and constricted affect. He states that he has been having suicidal ideations x 1 week, that are increasing in frequency, occurring daily. Pt with plan of jumping of a "tall building" in [place], however cannot specify a building. Pt can not provide a trigger or specific event for worsening depressed mood, however states that his depression started shortly after his release from [facility] in 2019. He reports poor family support, stating that he has been estranged from his family since being released from [facility]. He admits to poor concentration, poor sleep, feelings of worthlessness, and decreased energy. Pt also reports paranoid delusions of "people poisoning me" and admits to visual hallucination of bright stars that are not bothersome, stating that "I talk to them but they don't talk back."  Pt states that he is currently seeing a psychiatrist however when asked to provide information, gives card for [facility] that only contains therapist name. He states that he was seen by the psychiatrist 2 weeks ago and was given a intramuscular shot however cannot recall then name. 48M , domiciled with sister, unemployed on SSI, as per chart review psychiatric history of antisocial personality d/o and self reported past psychiatric h/o schizoaffective d/o bipolar type, OCD, PTSD, 4 past psychiatric state hospitalizations (most recent 2017 in [place] x 3 weeks),  extensive legal history with most recent incarceration in [place] (released April 2019), no past SA, h/o cutting behavior, and no reported substance use h/o (however per chart review prior cocaine use) presenting to the ED for suicidal ideations. Pt was most recently evaluated by Psychiatry in the ED on 7/12 for psychosis, and was not admitted to [facility] as patient's history and presenting symptoms were inconsistent, vague and there was high suspicion for malingering. Pt also seen in ED on July 10 for chest pain, making no mention of prior psychiatric history or psychiatric symptoms, and given secondary diagnosis of drug-seeking behavior.     Upon approach, patient calm, cooperative, sitting up in chair, wearing hospital gown. Pt is a poor historian, frequently providing vague answers stating "I don't know". He presents with poor eye contact and constricted affect. He states that he has been having suicidal ideations x 1 week, that are increasing in frequency, occurring daily. Pt with plan of jumping of a "tall building" in [place], however cannot specify a building. 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 He reports poor family support, stating that he has been estranged from his family since being released from skilled nursing. He admits to poor concentration, poor sleep, feelings of worthlessness, and decreased energy. Pt also reports paranoid delusions of "people poisoning me" and admits to visual hallucination of bright stars that are not bothersome, stating that "I talk to them but they don't talk back."  Pt states that he is currently seeing a psychiatrist however when asked to provide information, [facility]. He states that he was seen by the psychiatrist 2 weeks ago and was given a intramuscular shot however cannot recall then name.

## 2019-08-01 NOTE — ED BEHAVIORAL HEALTH ASSESSMENT NOTE - RISK ASSESSMENT
Pt is at high imminent risk given h/o past [facility] admissions, h/o SIB, poor social support, recent incarceration, family h/o psychiatric illness, depressed mood, inability to engage in safety planning, housing instability, poor treatment compliance, past h/o cocaine use, which is not mitigated by no previous SA. Given above patient requiring [facility] admission for stabilization and treatment management.

## 2019-08-01 NOTE — ED ADULT NURSE REASSESSMENT NOTE - NS ED NURSE REASSESS COMMENT FT1
Received pt from previous nurse Dimple nurse, no SOB , calm , no agitation , denies thoughts of harming self this time

## 2019-08-01 NOTE — ED ADULT NURSE NOTE - CHIEF COMPLAINT QUOTE
"last night I started feeling depressed and having suicidal thoughts." pt states he has a plan. pt refusing to talk to me states he wants to speak to a doctor. pt states he is not having hallucinations but is staring off like he is looking at something and looking at his arm as if something is in it
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
## 2019-08-01 NOTE — ED PROVIDER NOTE - PROGRESS NOTE DETAILS
1:1 ordered. Psych consulted. Per psych, admit to psych VBG showing normal K and Ca. CMP result likely lab error. EKG w/o evidence of hyperK or hypoCa Pt signed out to Dr. Balderas- [facility] pending bed

## 2019-08-01 NOTE — ED BEHAVIORAL HEALTH ASSESSMENT NOTE - SUMMARY
48M , domiciled with sister, unemployed on SSI, as per chart review psychiatric history of antisocial personality d/o and self reported past psychiatric h/o schizoaffective d/o bipolar type, OCD, PTSD, 4 past psychiatric state hospitalizations (most recent 2017 in [place] x 3 weeks),  extensive legal history with most recent incarceration in [place] (released April 2019), no past SA, h/o cutting behavior, and no reported substance use h/o (however per chart review prior cocaine use) presenting to the ED for suicidal ideations. Pt was most recently evaluated by Psychiatry in the ED on 7/12 for psychosis, and was not admitted to [facility] as patient's history and presenting symptoms were inconsistent, vague and there was high suspicion for malingering. Pt also seen in ED on July 10 for chest pain, making no mention of prior psychiatric history or psychiatric symptoms, and given secondary diagnosis of drug-seeking behavior.     Upon approach, pt calm cooperative, with poor eye contact and constricted affect. Pt reporting 1 week history of worsening depressed mood and daily suicidal ideations with plan to jump off "tall building." Pt recently seen in the ED by psychiatry for psychosis and discharged home with high suspicion for malingering. Although there currently appears be a high suspicion for malingering given previous ED admissions and vague inconsistent history, pt with multiple risk factors including poor social support, recent incarceration, previous [facility] admissions, previous h/o SIB, inability to engage in safety planning, and housing instability and thus requires IPP admission for further treatment and stabilization.

## 2019-08-01 NOTE — ED ADULT NURSE NOTE - BRADEN SCORE (IF 18 OR LESS ACTIVATE SKIN INJURY RISK INCREASED GUIDELINE), MLM
Correction to dose done and script resent.   Script has been E-Prescribed per Protocol.  Due for Medicare Wellness Visit and Fasting Labs.  BMD  Pneumonia vacc.  Letter sent.   23 English

## 2019-08-01 NOTE — ED BEHAVIORAL HEALTH ASSESSMENT NOTE - SUICIDE RISK FACTORS
Hopelessness/Mood episode/Unable to engage in safety planning/Anhedonia/Chronic pain or acute medical issue

## 2019-08-01 NOTE — ED BEHAVIORAL HEALTH ASSESSMENT NOTE - OTHER
reports domiciled with sister, however pt with [facility] card and could not provide sister number reports domiciled with sister, however pt with [facility] card and could not provide sister's number
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
## 2019-08-01 NOTE — ED BEHAVIORAL HEALTH ASSESSMENT NOTE - OTHER PAST PSYCHIATRIC HISTORY (INCLUDE DETAILS REGARDING ONSET, COURSE OF ILLNESS, INPATIENT/OUTPATIENT TREATMENT)
per chart review h/o antisocial d/o, pt self reports h/o bipolar d/o, OCT, PTSD, "psychotic thoughts" schizoaffective d/o bipolar type

## 2019-08-01 NOTE — ED BEHAVIORAL HEALTH ASSESSMENT NOTE - DESCRIPTION
pt calm, cooperative, no behavioral issues unemployed, on disability and receiving social security, domiciled with sister, moved to [place] in April from [place] asthma

## 2019-08-01 NOTE — ED PROVIDER NOTE - ATTENDING CONTRIBUTION TO CARE
47 y/o male with PMH of antisocial personality disorder, schizoaffective disorder  here for SI. pt states he has many plans to harm himself but has not acted on them yet. pt w/ hx SA by hanging. pt also endorses "seeing things" but will not comment further. no HI. pt denies any ingestion or overdose attempt. no auditory hallucinations    vss  gen- NAD, aaox3  card-rrr  lungs-ctab, no wheezing or rhonchi  abd-sntnd, no guarding or rebound  neuro- full str/sensation, cn ii-xii grossly intact, normal coordination and gait  psych- guarded, anxious, withdrawn    schizoaffective w/ depression and SI, higher risk  will get psych labs, psych consult, dispo per psych

## 2019-08-01 NOTE — ED BEHAVIORAL HEALTH ASSESSMENT NOTE - AXIS IV
Problem related to social environment/Problems with access to healthcare services/Problems with interaction with legal system/Housing problems/Occupational problems/Economic problems/Problems with primary support

## 2019-08-01 NOTE — ED BEHAVIORAL HEALTH ASSESSMENT NOTE - DETAILS
plan  to jump off "tall building in [place]" pt with extensive legal history mother/father: cancer per chart review h/o abuse acetaminophen, ibuprofen, fish after hours plan discussed with patient and ED team pt with extensive legal history, states h/o aggression in [place] however cannot recall specific event "feel like a zombie" mother: unsure of diagnosis, states he was "talking to her self" and admitted to psychiatric hospital, sister/brother: pt unable to provide diagnosis

## 2019-08-01 NOTE — ED ADULT NURSE NOTE - OBJECTIVE STATEMENT
Patient told triage RN he felt depressed and suicidal.  RN assessment in main ER patient denies any homicidal or suicidal ideations patient "what are you talking about"  Patient is awake alert oriented X 3 states he doesn't know why he is here.  Patient c/o of left hip pain states he has Titatium in his hip and is on tramadol.  Patient will is non compliant with questions .  Patient has 1:1 sitter at bedside for safety.  Patient has flat affect not making eye contact patient is cooperative with staff at this time

## 2019-08-02 DIAGNOSIS — J45.909 UNSPECIFIED ASTHMA, UNCOMPLICATED: ICD-10-CM

## 2019-08-02 LAB
ESTIMATED AVERAGE GLUCOSE: 97 MG/DL — SIGNIFICANT CHANGE UP (ref 68–114)
HAV IGM SER-ACNC: SIGNIFICANT CHANGE UP
HBA1C BLD-MCNC: 5 % — SIGNIFICANT CHANGE UP (ref 4–5.6)
HBV CORE IGM SER-ACNC: SIGNIFICANT CHANGE UP
HBV SURFACE AG SER-ACNC: SIGNIFICANT CHANGE UP
HCV AB S/CO SERPL IA: 0.09 S/CO — SIGNIFICANT CHANGE UP (ref 0–0.99)
HCV AB SERPL-IMP: SIGNIFICANT CHANGE UP

## 2019-08-02 PROCEDURE — 99231 SBSQ HOSP IP/OBS SF/LOW 25: CPT

## 2019-08-02 RX ORDER — NICOTINE POLACRILEX 2 MG
4 GUM BUCCAL
Refills: 0 | Status: DISCONTINUED | OUTPATIENT
Start: 2019-08-02 | End: 2019-08-09

## 2019-08-02 RX ORDER — GABAPENTIN 400 MG/1
100 CAPSULE ORAL
Refills: 0 | Status: DISCONTINUED | OUTPATIENT
Start: 2019-08-02 | End: 2019-08-09

## 2019-08-02 RX ORDER — HYDROXYZINE HCL 10 MG
50 TABLET ORAL EVERY 8 HOURS
Refills: 0 | Status: DISCONTINUED | OUTPATIENT
Start: 2019-08-02 | End: 2019-08-09

## 2019-08-02 RX ORDER — ESCITALOPRAM OXALATE 10 MG/1
10 TABLET, FILM COATED ORAL DAILY
Refills: 0 | Status: DISCONTINUED | OUTPATIENT
Start: 2019-08-02 | End: 2019-08-09

## 2019-08-02 RX ADMIN — GABAPENTIN 100 MILLIGRAM(S): 400 CAPSULE ORAL at 23:30

## 2019-08-02 RX ADMIN — Medication 2 MILLIGRAM(S): at 23:42

## 2019-08-02 RX ADMIN — Medication 50 MILLIGRAM(S): at 23:41

## 2019-08-02 NOTE — H&P ADULT - ASSESSMENT
47 yo m h/o asthma schizoaffective d/o bipolar type, OCD, PTSD, with past [facility] admissions presents with suicidal ideations.   admitted to [facility]   care as per psych   regular diet   ambulate, dvt ppx

## 2019-08-02 NOTE — CHART NOTE - NSCHARTNOTEFT_GEN_A_CORE
Mr. Tony Moses is a 48 year old   male with a history of Antisocial Personality Disorder (as per chart review) and self reported psychiatric history of Schizoaffective Disorder Bipolar type, OCD, and PTSD who presented to the ED for suicidal ideations that are increasing in frequency and occurring daily. Mr. Moses reports a plan of jumping off a "tall building" in [place]. He also reports paranoid delusions of people poisoning him and visual hallucinations of bright stars that he can talk to "but they don't talk back". He presented as a poor historian frequently providing vague answers. Reported stressors include increased depression starting shortly after his release from long-term in [place] in 2019 and estrangement from his family. He was admitted to [facility] for safety and stabilization.     In the community, Mr. Moses is unemployed and receives SSI disability benefits. He reports he is domiciled with his sister but was unable to provide her contact information and presented with a [employer] card. He reports four past psychiatric state hospitalizations, most recent 2017 in [place]. He presents with an extensive legal history with his most recent incarceration in [place], released April 2019. Mr. Tony Moses is a 48 year old   male with a history of Antisocial Personality Disorder (as per chart review) and self reported psychiatric history of Schizoaffective Disorder Bipolar type, OCD, and PTSD who presented to the ED for suicidal ideations that are increasing in frequency and occurring daily. Mr. Moses reports a plan of jumping off a "tall building" in [place]. He also reports paranoid delusions of people poisoning him and visual hallucinations of bright stars that he can talk to "but they don't talk back". He presented as a poor historian frequently providing vague answers. Reported stressors include increased depression starting shortly after his release from nursing home in [place] in 2019 and estrangement from his family. He was admitted to [facility] for safety and stabilization.     In the community, Mr. Moses is unemployed and receives SSI disability benefits. He reports he is domiciled with his sister but was unable to provide her contact information and presented with a [employer] card. He reports four past psychiatric state hospitalizations, most recent 2017 in [place]. He presents with an extensive legal history with his most recent incarceration in [place], released April 2019. As per chart review, he is currently on parole. He denies current substance use. He reports he is currently seeing a Psychiatrist and provided treatment team with a card for [facility], Mr. Aguilera [phone].      will continue to meet with Mr. Moses one on one and with treatment team daily. Discharge plan to be determined by treatment team in collaboration with patient. Please refer to Social Work Psychosocial for additional information.

## 2019-08-02 NOTE — PROGRESS NOTE BEHAVIORAL HEALTH - NSBHFUPSTRENGTHS_PSY_A_CORE
Awareness of substance use issues/Has supportive interpersonal relationships with family, friends or peers

## 2019-08-02 NOTE — PROGRESS NOTE BEHAVIORAL HEALTH - PROBLEM SELECTOR PLAN 1
- Give Lexapro 10mg qd.  - Order EKG.  - Get UDS.  - Get information regarding his current psychiatrist/outpatient provider and sister for collateral.  - Social work is looking into adult homes for patient. - Give Lexapro 10mg qd.  - Order EKG.  - Get UDS.  - Get information regarding his current psychiatrist/outpatient provider and sister for collateral.  - Social work is looking into adult homes for patient.  - If severe agitation, give Haldol 5mg PO PRN q6, or Ativan 2mg PO PRN q8 (IM if refuses).
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
## 2019-08-02 NOTE — CONSULT NOTE ADULT - SUBJECTIVE AND OBJECTIVE BOX
FABIOLASCVIRGILIO CRUMP  48y  Male      Patient is a 48y old  Male who presents with a chief complaint of suicidal thoughts (02 Aug 2019 00:59)        PAST MEDICAL/SURGICAL HISTORY  PAST MEDICAL & SURGICAL HISTORY:  Schizophrenia  History of hip surgery: left      REVIEW OF SYSTEMS:  CONSTITUTIONAL: No fever, weight loss, or fatigue  EYES: No eye pain, visual disturbances, or discharge  ENMT:  No difficulty hearing, tinnitus, vertigo; No sinus or throat pain  NECK: No pain or stiffness  RESPIRATORY: No cough, wheezing, chills or hemoptysis; No shortness of breath  CARDIOVASCULAR: No chest pain, palpitations, dizziness, or leg swelling  GASTROINTESTINAL: No abdominal or epigastric pain. No nausea, vomiting, or hematemesis; No diarrhea or constipation. No melena or hematochezia.  GENITOURINARY: No dysuria, frequency, hematuria, or incontinence    ALLERY AND IMMUNOLOGIC: No hives or eczema    ALBUTerol    90 MICROgram(s) HFA Inhaler 2 Puff(s) Inhalation every 6 hours PRN  haloperidol     Tablet 5 milliGRAM(s) Oral every 6 hours PRN  LORazepam     Tablet 2 milliGRAM(s) Oral every 8 hours PRN  nicotine  Polacrilex Gum 4 milliGRAM(s) Oral every 2 hours PRN      T(C): 35.6 (08-02-19 @ 00:35), Max: 35.8 (08-01-19 @ 17:21)  HR: 73 (08-02-19 @ 00:35) (66 - 73)  BP: 105/73 (08-02-19 @ 00:35) (100/58 - 112/73)  RR: 17 (08-02-19 @ 00:35) (17 - 20)  SpO2: 100% (08-01-19 @ 17:21) (100% - 100%)  Wt(kg): --Vital Signs Last 24 Hrs  T(C): 35.6 (02 Aug 2019 00:35), Max: 35.8 (01 Aug 2019 17:21)  T(F): 96.1 (02 Aug 2019 00:35), Max: 96.5 (01 Aug 2019 17:21)  HR: 73 (02 Aug 2019 00:35) (66 - 73)  BP: 105/73 (02 Aug 2019 00:35) (100/58 - 112/73)  BP(mean): --  RR: 17 (02 Aug 2019 00:35) (17 - 20)  SpO2: 100% (01 Aug 2019 17:21) (100% - 100%)    PHYSICAL EXAM:  GENERAL: NAD, well-groomed, well-developed  HEAD:  Atraumatic, Normocephalic  EYES: EOMI, PERRLA, conjunctiva and sclera clear  ENMT: No tonsillar erythema, exudates, or enlargement; Moist mucous membranes, Good dentition, No lesions  NECK: Supple, No JVD, Normal thyroid  NERVOUS SYSTEM:  Alert & Oriented X3, Good concentration; Motor Strength 5/5 B/L upper and lower extremities; DTRs 2+ intact and symmetric  CHEST/LUNG: Clear to percussion bilaterally; No rales, rhonchi, wheezing, or rubs  HEART: Regular rate and rhythm; No murmurs, rubs, or gallops  ABDOMEN: Soft, Nontender, Nondistended; Bowel sounds present  EXTREMITIES:  2+ Peripheral Pulses, No clubbing, cyanosis, or edema  LYMPH: No lymphadenopathy noted  SKIN: No rashes or lesions      LABS:  CBC   08-01-19 @ 19:55  Hematcorit 43.0  Hemoglobin 15.1  Mean Cell Hemoglobin 31.9  Platelet Count-Automated 151  RBC Count 4.73  Red Cell Distrib Width 12.7  Wbc Count 6.27      BMP  08-01-19 @ 19:55  Anion Gap. Serum 22  Blood Urea Nitrogen,Serm 12  Calcium, Total Serum <0.8  Carbon Dioxide, Serum 18  Chloride, Serum 97  Creatinine, Serum 1.1  eGFR in  90  eGFR in Non Afican American 78  Gloucose, serum 99  Potassium, Serum >10.0  Sodium, Serum 137                  CMP  08-01-19 @ 19:55  Rhona Aminotransferase(ALT/SGPT)11  Albumin, Serum 4.1  Alkaline Phosphatase, Serum 18  Anion Gap, Serum 22  Aspartate Aminotransferase (AST/SGOT)30  Bilirubin Total, Serum 0.4  Blood Urea Nitrogen, Serum 12  Calcium,Total Serum <0.8  Carbon Dioxide, Serum 18  Chloride, Serum 97  Creatinine, Serum 1.1  eGFR if  90  eGFR if Non African American 78  Glucose, Serum 99  Potassium, Serum >10.0  Protein Total, Serum 7.1  Sodium, Serum 137                          PT/INR      Amylase/Lipase            RADIOLOGY & ADDITIONAL TESTS:    Imaging Personally Reviewed:  [ ] YES  [ ] NO

## 2019-08-02 NOTE — H&P ADULT - NSHPREVIEWOFSYSTEMS_GEN_ALL_CORE
Constitutional: (-) fever (-) chills   Eyes: (-) visual changes  ENMT: (-) nasal or chest congestion (-) runny nose (-) sore throat (-) neck pain (-) neck stiffness  Cardiac: (-) chest pain (-) syncope  Respiratory: (-) cough (-) SOB  GI: (-) nausea (-) vomiting (-) diarrhea  : (-) incontinence  MS:(-) back pain   Neuro: (-) head injury (-) headache (-) dizziness (-) numbness/tingling to extremities B/L (-) LOC   Skin: (-) abrasion (-) rash (-) laceration  Except as documented in the HPI, all other systems are negative.

## 2019-08-02 NOTE — H&P ADULT - NSHPPHYSICALEXAM_GEN_ALL_CORE
GENERAL: NAD.  HEAD: NC/AT   Eyes: PERRLA, EOMI. Pink conjunctiva B/L. Non-icteric sclera.  ENMT: MMM. No pharyngeal erythema or exudates. Uvula midline.   Neck: Supple. No cervical midline TTP. No paravertebral TTP to traps. FROM  CVS: Normal S1,S2. No murmurs appreciated on auscultation   RESP: No use of accessory muscles. Chest rise symmetrical with good expansion. Lungs clear to auscultation B/L. No wheezing, rales, or rhonchi auscultated.  GI: Normal auscultation of bowel sounds in all 4 quadrants. Soft, Nontender, Nondistended.  MSK: no overlying ecchymosis, erythema, or swelling.  FROM . No midline spinal TTP.   Skin: Warm, Dry. No rashes or lesions.   EXT: Radial pulses present B/L. No calf tenderness or swelling B/L.   Neuro: AA&O x 3. Speaking in full sentences. No slurring of speech. No facial droop. No tremors. Sensation grossly intact. Strength 5/5 B/L  Psych: Calm

## 2019-08-02 NOTE — H&P ADULT - NSHPLABSRESULTS_GEN_ALL_CORE
15.1   6.27  )-----------( 151      ( 01 Aug 2019 19:55 )             43.0     08-01    137  |  97<L>  |  12  ----------------------------<  99  >10.0<HH>   |  18  |  1.1    Ca    <0.8<LL>      01 Aug 2019 19:55    TPro  7.1  /  Alb  4.1  /  TBili  0.4  /  DBili  x   /  AST  30  /  ALT  11  /  AlkPhos  18<L>  08-01

## 2019-08-02 NOTE — PROGRESS NOTE BEHAVIORAL HEALTH - NSBHFUPINTERVALHXFT_PSY_A_CORE
RutETN Patient seen and examined today, chart reviewed. Patient endorsed SI and very vague plan of jumping from building but nothing else really worked out beyond that. He endorsed depressed mood for last 3 months with increased alcohol intake. He also endorsed getting an IM injection 2wks ago from a psychiatrist but does not know what medication it was. Current stressors include residential instability and a lack of support system, especially since being incarcerated for the past 5 years or so and recently released in April. He endorses possible VH but is unclear, denies TRUONG GEORGES.

## 2019-08-02 NOTE — CHART NOTE - NSCHARTNOTEFT_GEN_A_CORE
called by nursing for complaint of pain. Patient is agitated, states that he has severe pain, was previously on tramadol 3x a day. Per isto, patient has not been given tramadol. He admits he hasn't taken it in a month. prn gabapentin ordered and pain management consult. Patient is angry but does not appear to be in acute distress over his pain, I suspect he is med seeking
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
## 2019-08-02 NOTE — PROGRESS NOTE BEHAVIORAL HEALTH - NSBHCHARTREVIEWINVESTIGATE_PSY_A_CORE FT
< from: 12 Lead ECG (08.01.19 @ 21:21) >    Ventricular Rate 63 BPM    Atrial Rate 63 BPM    P-R Interval 168 ms    QRS Duration 76 ms    Q-T Interval 442 ms    QTC Calculation(Bezet) 452 ms    P Axis 58 degrees    R Axis 55 degrees    T Axis 35 degrees    Diagnosis Line Normal sinus rhythm  Normal ECG    Confirmed by Suresh Rain ([id number]) on 8/1/2019 10:01:21 PM    < end of copied text >

## 2019-08-03 PROCEDURE — 99232 SBSQ HOSP IP/OBS MODERATE 35: CPT

## 2019-08-03 RX ADMIN — Medication 2 MILLIGRAM(S): at 16:15

## 2019-08-03 RX ADMIN — Medication 50 MILLIGRAM(S): at 11:29

## 2019-08-03 RX ADMIN — HALOPERIDOL DECANOATE 5 MILLIGRAM(S): 100 INJECTION INTRAMUSCULAR at 16:15

## 2019-08-03 RX ADMIN — GABAPENTIN 100 MILLIGRAM(S): 400 CAPSULE ORAL at 11:27

## 2019-08-03 RX ADMIN — ESCITALOPRAM OXALATE 10 MILLIGRAM(S): 10 TABLET, FILM COATED ORAL at 08:14

## 2019-08-03 RX ADMIN — Medication 4 MILLIGRAM(S): at 16:16

## 2019-08-03 RX ADMIN — Medication 50 MILLIGRAM(S): at 20:22

## 2019-08-03 NOTE — PROGRESS NOTE BEHAVIORAL HEALTH - NSBHFUPINTERVALHXFT_PSY_A_CORE
Chart reviewed. Spoke with nursing staff and pt has been interviewed. Pt denied S/H/i at this time.  He feels depressed for past 3 months with increased alcohol intake. He also endorsed getting an IM injection 2wks ago from a psychiatrist but does not know what medication it was. Current stressors include residential instability and a lack of support system, especially since being incarcerated for the past 5 years or so and recently released in April. He endorses possible VH but is unclear, denies AH, HI.

## 2019-08-04 PROCEDURE — 99231 SBSQ HOSP IP/OBS SF/LOW 25: CPT

## 2019-08-04 RX ORDER — TRAMADOL HYDROCHLORIDE 50 MG/1
50 TABLET ORAL EVERY 8 HOURS
Refills: 0 | Status: DISCONTINUED | OUTPATIENT
Start: 2019-08-04 | End: 2019-08-07

## 2019-08-04 RX ADMIN — Medication 2 MILLIGRAM(S): at 23:35

## 2019-08-04 RX ADMIN — TRAMADOL HYDROCHLORIDE 50 MILLIGRAM(S): 50 TABLET ORAL at 19:40

## 2019-08-04 RX ADMIN — GABAPENTIN 100 MILLIGRAM(S): 400 CAPSULE ORAL at 18:01

## 2019-08-04 RX ADMIN — TRAMADOL HYDROCHLORIDE 50 MILLIGRAM(S): 50 TABLET ORAL at 16:54

## 2019-08-04 RX ADMIN — ESCITALOPRAM OXALATE 10 MILLIGRAM(S): 10 TABLET, FILM COATED ORAL at 08:16

## 2019-08-04 RX ADMIN — GABAPENTIN 100 MILLIGRAM(S): 400 CAPSULE ORAL at 11:14

## 2019-08-04 NOTE — CHART NOTE - NSCHARTNOTEFT_GEN_A_CORE
Patient C/O Sciatica pain   -claims he can not take Tylenol: upset stomach or Motrin: made a " Hole in my Stomach".    Plan: Tramadol 50mg Q8H/PRN X 3 days

## 2019-08-05 LAB
ALBUMIN SERPL ELPH-MCNC: 4.5 G/DL — SIGNIFICANT CHANGE UP (ref 3.5–5.2)
ALP SERPL-CCNC: 65 U/L — SIGNIFICANT CHANGE UP (ref 30–115)
ALT FLD-CCNC: 12 U/L — SIGNIFICANT CHANGE UP (ref 0–41)
AMMONIA BLD-MCNC: 33 UMOL/L — SIGNIFICANT CHANGE UP (ref 11–55)
ANION GAP SERPL CALC-SCNC: 12 MMOL/L — SIGNIFICANT CHANGE UP (ref 7–14)
AST SERPL-CCNC: 18 U/L — SIGNIFICANT CHANGE UP (ref 0–41)
BILIRUB SERPL-MCNC: 0.3 MG/DL — SIGNIFICANT CHANGE UP (ref 0.2–1.2)
BUN SERPL-MCNC: 20 MG/DL — SIGNIFICANT CHANGE UP (ref 10–20)
CALCIUM SERPL-MCNC: 9.7 MG/DL — SIGNIFICANT CHANGE UP (ref 8.5–10.1)
CHLORIDE SERPL-SCNC: 101 MMOL/L — SIGNIFICANT CHANGE UP (ref 98–110)
CHOLEST SERPL-MCNC: 223 MG/DL — HIGH (ref 100–200)
CO2 SERPL-SCNC: 27 MMOL/L — SIGNIFICANT CHANGE UP (ref 17–32)
CREAT SERPL-MCNC: 1.1 MG/DL — SIGNIFICANT CHANGE UP (ref 0.7–1.5)
GLUCOSE SERPL-MCNC: 116 MG/DL — HIGH (ref 70–99)
HCT VFR BLD CALC: 45.5 % — SIGNIFICANT CHANGE UP (ref 42–52)
HDLC SERPL-MCNC: 76 MG/DL — SIGNIFICANT CHANGE UP
HGB BLD-MCNC: 16 G/DL — SIGNIFICANT CHANGE UP (ref 14–18)
LIPID PNL WITH DIRECT LDL SERPL: 147 MG/DL — HIGH (ref 4–129)
MCHC RBC-ENTMCNC: 32.2 PG — HIGH (ref 27–31)
MCHC RBC-ENTMCNC: 35.2 G/DL — SIGNIFICANT CHANGE UP (ref 32–37)
MCV RBC AUTO: 91.5 FL — SIGNIFICANT CHANGE UP (ref 80–94)
NRBC # BLD: 0 /100 WBCS — SIGNIFICANT CHANGE UP (ref 0–0)
PLATELET # BLD AUTO: 148 K/UL — SIGNIFICANT CHANGE UP (ref 130–400)
POTASSIUM SERPL-MCNC: 4.1 MMOL/L — SIGNIFICANT CHANGE UP (ref 3.5–5)
POTASSIUM SERPL-SCNC: 4.1 MMOL/L — SIGNIFICANT CHANGE UP (ref 3.5–5)
PROT SERPL-MCNC: 6.9 G/DL — SIGNIFICANT CHANGE UP (ref 6–8)
RBC # BLD: 4.97 M/UL — SIGNIFICANT CHANGE UP (ref 4.7–6.1)
RBC # FLD: 12.1 % — SIGNIFICANT CHANGE UP (ref 11.5–14.5)
SODIUM SERPL-SCNC: 140 MMOL/L — SIGNIFICANT CHANGE UP (ref 135–146)
TOTAL CHOLESTEROL/HDL RATIO MEASUREMENT: 2.9 RATIO — LOW (ref 4–5.5)
TRIGL SERPL-MCNC: 137 MG/DL — SIGNIFICANT CHANGE UP (ref 10–149)
WBC # BLD: 7.64 K/UL — SIGNIFICANT CHANGE UP (ref 4.8–10.8)
WBC # FLD AUTO: 7.64 K/UL — SIGNIFICANT CHANGE UP (ref 4.8–10.8)

## 2019-08-05 PROCEDURE — 99231 SBSQ HOSP IP/OBS SF/LOW 25: CPT

## 2019-08-05 RX ADMIN — TRAMADOL HYDROCHLORIDE 50 MILLIGRAM(S): 50 TABLET ORAL at 09:23

## 2019-08-05 RX ADMIN — GABAPENTIN 100 MILLIGRAM(S): 400 CAPSULE ORAL at 23:14

## 2019-08-05 RX ADMIN — TRAMADOL HYDROCHLORIDE 50 MILLIGRAM(S): 50 TABLET ORAL at 18:58

## 2019-08-05 RX ADMIN — Medication 50 MILLIGRAM(S): at 20:07

## 2019-08-05 RX ADMIN — ESCITALOPRAM OXALATE 10 MILLIGRAM(S): 10 TABLET, FILM COATED ORAL at 09:22

## 2019-08-05 RX ADMIN — TRAMADOL HYDROCHLORIDE 50 MILLIGRAM(S): 50 TABLET ORAL at 17:42

## 2019-08-05 RX ADMIN — TRAMADOL HYDROCHLORIDE 50 MILLIGRAM(S): 50 TABLET ORAL at 09:30

## 2019-08-05 NOTE — PROGRESS NOTE BEHAVIORAL HEALTH - PROBLEM SELECTOR PLAN 1
- Give Lexapro 10mg qd.  - Order EKG.  - Get UDS.  - Call sister tomorrow.  - Get information regarding his current psychiatrist/outpatient provider and sister for collateral.  - Social work is looking into adult homes for patient.  - If severe agitation, give Haldol 5mg PO PRN q6, or Ativan 2mg PO PRN q8 (IM if refuses).

## 2019-08-05 NOTE — CHART NOTE - NSCHARTNOTEFT_GEN_A_CORE
Patient remains on unit for continued treatment safety and observation. Patient is visible on unit and compliant with treatment. Patient is currently on a 1:1 for safety. Writer meets with patient daily on rounds and or in team meeting. Writer provides support and education to the patient daily. Patient to remain on unit until cleared by attending for discharge. Patient denies suicidal ideation, homicidal ideation and audio visual hallucinations. Patient remains thought disordered, confused and impaired. Patient not stable for discharge at this time.

## 2019-08-05 NOTE — CHART NOTE - NSCHARTNOTEFT_GEN_A_CORE
Called to evaluate patient for mental confusion.  - I soke to patient in the presence of Dr Roth and he answered all my questions appropriately.    He was started on Tramadol yesterday for sciatica pain: this was discussed with Psychiatrist regarding the med, ordered for 3 days only  Plan: order CBC, CMP, Ammonia level, and UA

## 2019-08-05 NOTE — PROGRESS NOTE BEHAVIORAL HEALTH - SUMMARY
48M , domiciled with sister, unemployed on SSI, as per chart review psychiatric history of antisocial personality d/o and self reported past psychiatric h/o schizoaffective d/o bipolar type, OCD, PTSD, 4 past psychiatric state hospitalizations (most recent 2017 in [place] x 3 weeks),  extensive legal history with most recent incarceration in [place] (released April 2019), no past SA, h/o cutting behavior, and no reported substance use h/o (however per chart review prior cocaine use) presenting to the ED for suicidal ideations.   Although there currently appears be a high suspicion for malingering given previous ED admissions and vague inconsistent history, pt with multiple risk factors including poor social support, recent incarceration, previous [facility] admissions, previous h/o SIB, inability to engage in safety planning, and housing instability and thus requires IPP admission for further treatment and stabilization.

## 2019-08-05 NOTE — PROGRESS NOTE BEHAVIORAL HEALTH - NSBHFUPINTERVALHXFT_PSY_A_CORE
Patient seen and examined today, chart reviewed. Patient endorsed feeling better today. As per nursing, he was very confused this weekend and went into another patient's bed with the patient. As a result he was placed on 1:1. Today, he was cooperative on the unit, and he put in a 72hr letter.

## 2019-08-06 DIAGNOSIS — Z71.89 OTHER SPECIFIED COUNSELING: ICD-10-CM

## 2019-08-06 LAB
APPEARANCE UR: CLEAR — SIGNIFICANT CHANGE UP
BILIRUB UR-MCNC: NEGATIVE — SIGNIFICANT CHANGE UP
COLOR SPEC: YELLOW — SIGNIFICANT CHANGE UP
DIFF PNL FLD: NEGATIVE — SIGNIFICANT CHANGE UP
GLUCOSE UR QL: NEGATIVE MG/DL — SIGNIFICANT CHANGE UP
KETONES UR-MCNC: NEGATIVE — SIGNIFICANT CHANGE UP
LEUKOCYTE ESTERASE UR-ACNC: NEGATIVE — SIGNIFICANT CHANGE UP
NITRITE UR-MCNC: NEGATIVE — SIGNIFICANT CHANGE UP
PH UR: 7 — SIGNIFICANT CHANGE UP (ref 5–8)
PROT UR-MCNC: NEGATIVE MG/DL — SIGNIFICANT CHANGE UP
SP GR SPEC: 1.01 — SIGNIFICANT CHANGE UP (ref 1.01–1.03)
TSH SERPL-MCNC: 2.11 UIU/ML — SIGNIFICANT CHANGE UP (ref 0.27–4.2)
UROBILINOGEN FLD QL: 0.2 MG/DL — SIGNIFICANT CHANGE UP (ref 0.2–0.2)

## 2019-08-06 PROCEDURE — 99231 SBSQ HOSP IP/OBS SF/LOW 25: CPT

## 2019-08-06 RX ADMIN — TRAMADOL HYDROCHLORIDE 50 MILLIGRAM(S): 50 TABLET ORAL at 20:09

## 2019-08-06 RX ADMIN — TRAMADOL HYDROCHLORIDE 50 MILLIGRAM(S): 50 TABLET ORAL at 11:29

## 2019-08-06 RX ADMIN — GABAPENTIN 100 MILLIGRAM(S): 400 CAPSULE ORAL at 17:27

## 2019-08-06 RX ADMIN — ESCITALOPRAM OXALATE 10 MILLIGRAM(S): 10 TABLET, FILM COATED ORAL at 08:36

## 2019-08-06 RX ADMIN — TRAMADOL HYDROCHLORIDE 50 MILLIGRAM(S): 50 TABLET ORAL at 13:11

## 2019-08-06 NOTE — PROGRESS NOTE BEHAVIORAL HEALTH - PROBLEM SELECTOR PLAN 1
- c/w Lexapro 10mg qd.  - Order EKG.  - Get UDS.  - Call sister tomorrow.  - Get information regarding his current psychiatrist/outpatient provider and sister for collateral about injectable medication he has been on.  - Social work is looking into adult homes for patient.  - If severe agitation, give Haldol 5mg PO PRN q6, or Ativan 2mg PO PRN q8 (IM if refuses).
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
## 2019-08-06 NOTE — PROGRESS NOTE BEHAVIORAL HEALTH - SUMMARY
Waiting to hear back from sister. 48M , domiciled with sister, unemployed on SSI, as per chart review psychiatric history of antisocial personality d/o and self reported past psychiatric h/o schizoaffective d/o bipolar type, OCD, PTSD, 4 past psychiatric state hospitalizations (most recent 2017 in [place] x 3 weeks),  extensive legal history with most recent incarceration in [place] (released April 2019), no past SA, h/o cutting behavior, and no reported substance use h/o (however per chart review prior cocaine use) presenting to the ED for suicidal ideations.   Although there currently appears be a high suspicion for malingering given previous ED admissions and vague inconsistent history, pt with multiple risk factors including poor social support, recent incarceration, previous [facility] admissions, previous h/o SIB, inability to engage in safety planning, and housing instability and thus requires IPP admission for further treatment and stabilization.

## 2019-08-06 NOTE — PROGRESS NOTE BEHAVIORAL HEALTH - NSBHFUPINTERVALHXFT_PSY_A_CORE
Patient was seen and examined, and chart was reviewed. As per nursing, he remains disorganized and confused, for which patient remains on the 1:1. He states this morning that he is "all better," denying any SI/I/P. He endorses leg pain, which he has had due to sciatica. He put in a 72hr letter because he has his  meeting tomorrow morning. However, the patient appears confused and is not A&Ox4. He says it is "September 3, 2019" when asked the date. Patient was seen and examined, and chart was reviewed. As per nursing, he remains disorganized and confused, for which patient remains on the 1:1. He states this morning that he is "all better," denying any SI/I/P. He endorses leg pain, which he has had due to sciatica. He put in a 72hr letter because he has his  meeting tomorrow morning. However, the patient appears confused and is not A&Ox4. He says it is "September 3, 2019" when asked the date.  As per patient's sister, Lisette - patient has been confused like this at his baseline.

## 2019-08-07 PROCEDURE — 99231 SBSQ HOSP IP/OBS SF/LOW 25: CPT

## 2019-08-07 RX ORDER — TRAMADOL HYDROCHLORIDE 50 MG/1
50 TABLET ORAL EVERY 8 HOURS
Refills: 0 | Status: DISCONTINUED | OUTPATIENT
Start: 2019-08-07 | End: 2019-08-09

## 2019-08-07 RX ADMIN — TRAMADOL HYDROCHLORIDE 50 MILLIGRAM(S): 50 TABLET ORAL at 19:39

## 2019-08-07 RX ADMIN — TRAMADOL HYDROCHLORIDE 50 MILLIGRAM(S): 50 TABLET ORAL at 11:43

## 2019-08-07 RX ADMIN — GABAPENTIN 100 MILLIGRAM(S): 400 CAPSULE ORAL at 14:34

## 2019-08-07 RX ADMIN — TRAMADOL HYDROCHLORIDE 50 MILLIGRAM(S): 50 TABLET ORAL at 20:37

## 2019-08-07 RX ADMIN — Medication 2 MILLIGRAM(S): at 21:04

## 2019-08-07 RX ADMIN — ESCITALOPRAM OXALATE 10 MILLIGRAM(S): 10 TABLET, FILM COATED ORAL at 08:24

## 2019-08-07 RX ADMIN — TRAMADOL HYDROCHLORIDE 50 MILLIGRAM(S): 50 TABLET ORAL at 11:12

## 2019-08-07 NOTE — PROGRESS NOTE BEHAVIORAL HEALTH - SUMMARY
48M , domiciled with sister, unemployed on SSI, as per chart review psychiatric history of antisocial personality d/o and self reported past psychiatric h/o schizoaffective d/o bipolar type, OCD, PTSD, 4 past psychiatric state hospitalizations (most recent 2017 in [place] x 3 weeks),  extensive legal history with most recent incarceration in [place] (released April 2019), no past SA, h/o cutting behavior, and no reported substance use h/o (however per chart review prior cocaine use) presenting to the ED for suicidal ideations.   Although there currently appears be a high suspicion for malingering given previous ED admissions and vague inconsistent history, pt with multiple risk factors including poor social support, recent incarceration, previous [facility] admissions, previous h/o SIB, inability to engage in safety planning, and housing instability and thus requires IPP admission for further treatment and stabilization.

## 2019-08-07 NOTE — PROGRESS NOTE BEHAVIORAL HEALTH - NSBHFUPINTERVALHXFT_PSY_A_CORE
Patient was seen and examined, and chart was reviewed. Pt. was more pleasant, he was confabulating today, he has 2/3 registration, confabulated the third. He has poor recollection of events. He denied any SIIP.HIIP

## 2019-08-07 NOTE — CHART NOTE - NSCHARTNOTEFT_GEN_A_CORE
Patient was seen, and evaluated, and chart was reviewed. No acute events overnight,  Patient is slightly thought disordered,  less delusional with improved insight. Patient remains confused and not oriented.  Patient  is cooperative and compliant with treatment.  Patient denies and presents no evidence for suicidal or homicidal ideas plans or intentions. Patient reports normal appetite and normal sleep. Patient is on [id number] observations.  Patient is tolerating meds well without any acute side-effect's. Patient has no medication related complaints, questions or concerns. Patient is interested in attending the [facility] program when psychiatrically stabilized.  With patient's consent treatment team spoke with patient's sister, Lisette. Sister reports that patient is confused and not oriented at baseline. Sister confirms patient resides with her. Patient has not been cleared for discharge by attending as of yet; patient to remain on unit until cleared by attending.

## 2019-08-08 PROCEDURE — 99231 SBSQ HOSP IP/OBS SF/LOW 25: CPT

## 2019-08-08 RX ADMIN — TRAMADOL HYDROCHLORIDE 50 MILLIGRAM(S): 50 TABLET ORAL at 16:02

## 2019-08-08 RX ADMIN — ESCITALOPRAM OXALATE 10 MILLIGRAM(S): 10 TABLET, FILM COATED ORAL at 08:03

## 2019-08-08 RX ADMIN — TRAMADOL HYDROCHLORIDE 50 MILLIGRAM(S): 50 TABLET ORAL at 08:08

## 2019-08-08 RX ADMIN — GABAPENTIN 100 MILLIGRAM(S): 400 CAPSULE ORAL at 13:54

## 2019-08-08 NOTE — PROGRESS NOTE BEHAVIORAL HEALTH - NSBHADMITIPOBS_PSY_A_CORE
Constant observation
Routine observation
Routine observation
Constant observation

## 2019-08-08 NOTE — PROGRESS NOTE BEHAVIORAL HEALTH - PROBLEM SELECTOR PLAN 2
- Albuterol inhaler PRN.

## 2019-08-08 NOTE — PROGRESS NOTE BEHAVIORAL HEALTH - NSBHADMITIPOBSFT_PSY_A_CORE
escalation not warranted.
for safety
for safety
escalation not warranted.

## 2019-08-08 NOTE — PROGRESS NOTE BEHAVIORAL HEALTH - NSBHPTASSESSDT_PSY_A_CORE
03-Aug-2019 12:09
06-Aug-2019 11:08
08-Aug-2019 10:30
02-Aug-2019 17:11
05-Aug-2019 17:52
07-Aug-2019 09:30
03-Aug-2019 12:09

## 2019-08-08 NOTE — PROGRESS NOTE BEHAVIORAL HEALTH - NSBHCHARTREVIEWVS_PSY_A_CORE FT
Vital Signs Last 24 Hrs  T(C): 35.8 (06 Aug 2019 09:08), Max: 35.8 (06 Aug 2019 09:08)  T(F): 96.5 (06 Aug 2019 09:08), Max: 96.5 (06 Aug 2019 09:08)  HR: 75 (06 Aug 2019 09:08) (75 - 75)  BP: 105/69 (06 Aug 2019 09:08) (105/69 - 105/69)  BP(mean): --  RR: 16 (06 Aug 2019 09:08) (16 - 16)  SpO2: --
Vital Signs Last 24 Hrs  T(C): 36.6 (08 Aug 2019 08:11), Max: 36.6 (08 Aug 2019 08:11)  T(F): 97.8 (08 Aug 2019 08:11), Max: 97.8 (08 Aug 2019 08:11)  HR: 65 (08 Aug 2019 08:11) (55 - 69)  BP: 93/64 (08 Aug 2019 08:11) (93/64 - 103/62)  BP(mean): --  RR: 18 (08 Aug 2019 08:11) (16 - 18)  SpO2: --
Vital Signs Last 24 Hrs  T(C): 36.3 (05 Aug 2019 10:23), Max: 36.3 (05 Aug 2019 10:23)  T(F): 97.3 (05 Aug 2019 10:23), Max: 97.3 (05 Aug 2019 10:23)  HR: 54 (05 Aug 2019 10:23) (52 - 54)  BP: 87/50 (05 Aug 2019 10:23) (87/50 - 99/61)  BP(mean): --  RR: 20 (05 Aug 2019 10:23) (18 - 20)  SpO2: --
Vital Signs Last 24 Hrs  T(C): 36.6 (02 Aug 2019 17:20), Max: 36.6 (02 Aug 2019 17:20)  T(F): 97.9 (02 Aug 2019 17:20), Max: 97.9 (02 Aug 2019 17:20)  HR: 48 (02 Aug 2019 17:20) (48 - 73)  BP: 82/52 (02 Aug 2019 17:20) (82/52 - 105/73)  BP(mean): --  RR: 16 (02 Aug 2019 17:20) (16 - 17)  SpO2: --
Vital Signs Last 24 Hrs  T(C): 35.9 (07 Aug 2019 16:41), Max: 36 (07 Aug 2019 06:05)  T(F): 96.6 (07 Aug 2019 16:41), Max: 96.8 (07 Aug 2019 06:05)  HR: 69 (07 Aug 2019 16:41) (58 - 69)  BP: 103/62 (07 Aug 2019 16:41) (90/45 - 103/62)  BP(mean): --  RR: 16 (07 Aug 2019 16:41) (16 - 16)  SpO2: --

## 2019-08-08 NOTE — PROGRESS NOTE BEHAVIORAL HEALTH - DETAILS
acetaminophen, ibuprofen, fish

## 2019-08-08 NOTE — PROGRESS NOTE BEHAVIORAL HEALTH - NSBHFUPINTERVALCCFT_PSY_A_CORE
"I  am good."
"I'm alright a little bit."
Pt feels depressed.
"I'm very depressed."
"I'm doing better."
"I  am good."
Pt feels depressed.

## 2019-08-08 NOTE — PROGRESS NOTE BEHAVIORAL HEALTH - SUMMARY
48M , domiciled with sister, unemployed on SSI, as per chart review psychiatric history of antisocial personality d/o and self reported past psychiatric h/o schizoaffective d/o bipolar type, OCD, PTSD, 4 past psychiatric state hospitalizations (most recent 2017 in [place] x 3 weeks),  extensive legal history with most recent incarceration in [place] (released April 2019), no past SA, h/o cutting behavior, and no reported substance use h/o (however per chart review prior cocaine use) presenting to the ED for suicidal ideations.   Although there currently appears be a high suspicion for malingering given previous ED admissions and vague inconsistent history, pt with multiple risk factors including poor social support, recent incarceration, previous IPP admissions, previous h/o SIB, inability to engage in safety planning, and housing instability and thus requires IPP admission for further treatment and stabilization.

## 2019-08-08 NOTE — PROGRESS NOTE BEHAVIORAL HEALTH - THOUGHT CONTENT
Guilt/Suicidality/Delusions/Hopelessness
Guilt/Suicidality/Delusions/Hopelessness
Hopelessness/Guilt/Delusions/Suicidality
Suicidality/Delusions/Hopelessness/Guilt
Suicidality/Guilt/Delusions/Hopelessness
Delusions/Suicidality/Hopelessness/Guilt
Suicidality/Delusions/Guilt/Hopelessness

## 2019-08-08 NOTE — PROGRESS NOTE BEHAVIORAL HEALTH - NSBHFUPINTERVALHXFT_PSY_A_CORE
Patient was seen and examined, and chart was reviewed. Pt. was more pleasant, he was confabulating today, he was able to recall 3 presidents , serial 7 ( [id number]...), he recalled his SW name ( Martha ), confabulated with smile this MD name ( Dr. BENAVIDEZ then Senthil...)  He denied any SI or plans, he is excited to be DC tomorrow and FU with PHP

## 2019-08-08 NOTE — PROGRESS NOTE BEHAVIORAL HEALTH - NS ED BHA MSE SPEECH SPONTANEITY
Increased latency
Normal
Increased latency
Increased latency

## 2019-08-08 NOTE — PROGRESS NOTE BEHAVIORAL HEALTH - NSBHATTESTSEENBY_PSY_A_CORE
attending Psychiatrist without NP/Trainee
Attending Psychiatrist supervising NP/Trainee, meeting pt...
Attending Psychiatrist supervising NP/Trainee, meeting pt...

## 2019-08-08 NOTE — PROGRESS NOTE BEHAVIORAL HEALTH - NSBHCHARTREVIEWLAB_PSY_A_CORE FT
Comprehensive Metabolic Panel in AM (08.05.19 @ 16:15)    Sodium, Serum: 140 mmol/L    Potassium, Serum: 4.1 mmol/L    Chloride, Serum: 101 mmol/L    Carbon Dioxide, Serum: 27 mmol/L    Anion Gap, Serum: 12 mmol/L    Blood Urea Nitrogen, Serum: 20 mg/dL    Creatinine, Serum: 1.1 mg/dL    Glucose, Serum: 116 mg/dL    Calcium, Total Serum: 9.7 mg/dL    Protein Total, Serum: 6.9 g/dL    Albumin, Serum: 4.5 g/dL    Bilirubin Total, Serum: 0.3 mg/dL    Alkaline Phosphatase, Serum: 65 U/L    Aspartate Aminotransferase (AST/SGOT): 18 U/L    Alanine Aminotransferase (ALT/SGPT): 12 U/L    eGFR if Non African American: 78  Ammonia, Serum (08.05.19 @ 16:15)    Ammonia, Serum: 33 umol/L
Complete Blood Count (08.05.19 @ 16:15)    Nucleated RBC: 0 /100 WBCs    WBC Count: 7.64 K/uL    RBC Count: 4.97 M/uL    Hemoglobin: 16.0 g/dL    Hematocrit: 45.5 %    Mean Cell Volume: 91.5 fL    Mean Cell Hemoglobin: 32.2 pg    Mean Cell Hemoglobin Conc: 35.2 g/dL    Red Cell Distrib Width: 12.1 %    Platelet Count - Automated: 148 K/uL  Comprehensive Metabolic Panel in AM (08.05.19 @ 16:15)    Sodium, Serum: 140 mmol/L    Potassium, Serum: 4.1 mmol/L    Chloride, Serum: 101 mmol/L    Carbon Dioxide, Serum: 27 mmol/L    Anion Gap, Serum: 12 mmol/L    Blood Urea Nitrogen, Serum: 20 mg/dL    Creatinine, Serum: 1.1 mg/dL    Glucose, Serum: 116 mg/dL    Calcium, Total Serum: 9.7 mg/dL    Protein Total, Serum: 6.9 g/dL    Albumin, Serum: 4.5 g/dL    Bilirubin Total, Serum: 0.3 mg/dL    Alkaline Phosphatase, Serum: 65 U/L    Aspartate Aminotransferase (AST/SGOT): 18 U/L    Alanine Aminotransferase (ALT/SGPT): 12 U/L    eGFR if Non : 78
Complete Blood Count + Automated Diff (08.01.19 @ 19:55)    WBC Count: 6.27 K/uL    RBC Count: 4.73 M/uL    Hemoglobin: 15.1 g/dL    Hematocrit: 43.0 %    Mean Cell Volume: 90.9 fL    Mean Cell Hemoglobin: 31.9 pg    Mean Cell Hemoglobin Conc: 35.1 g/dL    Red Cell Distrib Width: 12.7 %    Platelet Count - Automated: 151 K/uL    Auto Neutrophil #: 3.20 K/uL    Auto Lymphocyte #: 1.94 K/uL    Auto Monocyte #: 0.57 K/uL    Auto Eosinophil #: 0.46 K/uL    Auto Basophil #: 0.07 K/uL    Auto Neutrophil %: 51.1: Differential percentages must be correlated with absolute numbers for  clinical significance. %    Auto Lymphocyte %: 30.9 %    Auto Monocyte %: 9.1 %    Auto Eosinophil %: 7.3 %    Auto Basophil %: 1.1 %    Auto Immature Granulocyte %: 0.5 %    Nucleated RBC: 0 /100 WBCs  Comprehensive Metabolic Panel (08.01.19 @ 19:55)    Sodium, Serum: 137 mmol/L    Potassium, Serum: >10.0: Hemolyzed. Interpret with caution  Critical value: mmol/L    Chloride, Serum: 97 mmol/L    Carbon Dioxide, Serum: 18 mmol/L    Anion Gap, Serum: 22 mmol/L    Blood Urea Nitrogen, Serum: 12 mg/dL    Creatinine, Serum: 1.1 mg/dL    Glucose, Serum: 99 mg/dL    Calcium, Total Serum: <0.8: TYPE:(C=Critical, N=Notification, A=Abnormal) C  TESTS: _CA, K  DATE/TIME CALLED: _08/01/19 20:51  CALLED TO: _DR. BALTAZAR  READ BACK (2 Patient Identifiers)(Y/N): _Y  READ BACK VALUES (Y/N): _Y  CALLED BY: _ML  HEMOLYSIS mg/dL    Protein Total, Serum: 7.1 g/dL    Albumin, Serum: 4.1 g/dL    Bilirubin Total, Serum: 0.4 mg/dL    Alkaline Phosphatase, Serum: 18 U/L    Aspartate Aminotransferase (AST/SGOT): 30: Hemolyzed. Interpret with caution U/L    Alanine Aminotransferase (ALT/SGPT): 11: Hemolyzed. Interpret with caution U/L    eGFR if Non : 78

## 2019-08-08 NOTE — PROGRESS NOTE BEHAVIORAL HEALTH - RISK ASSESSMENT
pt is at high imminent risk given h/o past IPP admissions, h/o SIB, poor social support, recent incarceration, family h/o psychiatric illness, depressed mood, inability to engage in safety planning, housing instability, poor treatment compliance, past h/o cocaine use, which is not mitigated by no previous SA. Given above patient requiring IPP admission for stabilization and treatment management

## 2019-08-09 ENCOUNTER — TRANSCRIPTION ENCOUNTER (OUTPATIENT)
Age: 49
End: 2019-08-09

## 2019-08-09 VITALS
DIASTOLIC BLOOD PRESSURE: 64 MMHG | SYSTOLIC BLOOD PRESSURE: 104 MMHG | TEMPERATURE: 96 F | RESPIRATION RATE: 16 BRPM | HEART RATE: 66 BPM

## 2019-08-09 PROCEDURE — 99231 SBSQ HOSP IP/OBS SF/LOW 25: CPT

## 2019-08-09 RX ORDER — ESCITALOPRAM OXALATE 10 MG/1
1 TABLET, FILM COATED ORAL
Qty: 7 | Refills: 0
Start: 2019-08-09 | End: 2019-08-15

## 2019-08-09 RX ADMIN — ESCITALOPRAM OXALATE 10 MILLIGRAM(S): 10 TABLET, FILM COATED ORAL at 08:09

## 2019-08-09 RX ADMIN — TRAMADOL HYDROCHLORIDE 50 MILLIGRAM(S): 50 TABLET ORAL at 08:13

## 2019-08-09 NOTE — DISCHARGE NOTE BEHAVIORAL HEALTH - NSBHDCDXVALIDYESFT_PSY_A_CORE
Patient presented to the inpatient unit with feelings of depression, as well as SI with a vague plan of jumping off of a building. Patient expressed a lot of interest in finding housing outside of his sister's home as well, suggesting some alternative motives for IPP. The patient also presented fairly confused - unable to perform serial 7's, unable to remember his sister's phone number, or even the date at times. He endorses feeling more confused lately especially since his release in April, saying that he also recently lost his SS card.
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
## 2019-08-09 NOTE — DISCHARGE NOTE BEHAVIORAL HEALTH - HPI (INCLUDE ILLNESS QUALITY, SEVERITY, DURATION, TIMING, CONTEXT, MODIFYING FACTORS, ASSOCIATED SIGNS AND SYMPTOMS)
48M , domiciled with sister, unemployed on SSI, as per chart review psychiatric history of antisocial personality d/o and self reported past psychiatric h/o schizoaffective d/o bipolar type, OCD, PTSD, 4 past psychiatric state hospitalizations (most recent 2017 in [place] x 3 weeks),  extensive legal history with most recent incarceration in [place] (released April 2019), no past SA, h/o cutting behavior, and no reported substance use h/o (however per chart review prior cocaine use) presenting to the ED for suicidal ideations. Pt was most recently evaluated by Psychiatry in the ED on 7/12 for psychosis, and was not admitted to [facility] as patient's history and presenting symptoms were inconsistent, vague and there was high suspicion for malingering. Pt also seen in ED on July 10 for chest pain, making no mention of prior psychiatric history or psychiatric symptoms, and given secondary diagnosis of drug-seeking behavior.     Upon approach, patient calm, cooperative, sitting up in chair, wearing hospital gown. Pt is a poor historian, frequently providing vague answers stating "I don't know". He presents with poor eye contact and constricted affect. He states that he has been having suicidal ideations x 1 week, that are increasing in frequency, occurring daily. Pt with plan of jumping of a "tall building" in [place], however cannot specify a building. Pt can not provide a trigger or specific event for worsening depressed mood, however states that his depression started shortly after his release from long-term in 2019. He reports poor family support, stating that he has been estranged from his family since being released from long-term. He admits to poor concentration, poor sleep, feelings of worthlessness, and decreased energy.   Pt also reports paranoid delusions of "people poisoning me" and admits to visual hallucination of bright stars that are not bothersome, stating that "I talk to them but they don't talk back."  Pt states that he is currently seeing a psychiatrist however when asked to provide information, gives card for [facility] that only contains therapist name. He states that he was seen by the psychiatrist 2 weeks ago and was given a intramuscular shot however cannot recall then name.

## 2019-08-09 NOTE — DISCHARGE NOTE BEHAVIORAL HEALTH - NSBHDCSUICSAFETYFT_PSY_A_CORE
Patient is encouraged to reach out for help Patient is encouraged to reach out for help from his outpatient provider and/or siblings when he is in crisis or having thoughts of SI. In the case of emergency, he is encouraged to go to a crisis center or the ED.
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
## 2019-08-09 NOTE — DISCHARGE NOTE NURSING/CASE MANAGEMENT/SOCIAL WORK - NSDCPEHOTLINE_GEN_ALL_CORE
[facility] Smokers Quitline [phone] ([phone])

## 2019-08-09 NOTE — DISCHARGE NOTE BEHAVIORAL HEALTH - MEDICATION SUMMARY - MEDICATIONS TO TAKE
I will START or STAY ON the medications listed below when I get home from the hospital:    traMADol  -- Indication: For Pain    escitalopram 10 mg oral tablet  -- 1 tab(s) by mouth once a day x 7 days   continue to take until follow up with outpatient provider.  -- Indication: For Depression

## 2019-08-09 NOTE — DISCHARGE NOTE BEHAVIORAL HEALTH - NSBHDCSUICFCTRMIT_PSY_A_CORE
Siblings nearby, member of [employer], both good resources for patient to reach out to. Patient is internally motivated to receive outpatient treatment and adult home housing.

## 2019-08-09 NOTE — CHART NOTE - NSCHARTNOTEFT_GEN_A_CORE
Patient discharging today as per attending. Patient AOx3, denies SI HI and AVH. Pt in good behavioral control. Pt eager for discharge and expresses a commitment to the discharge plan.  Please see below for detailed social work discharge plan.      SOCIAL WORK:    SOCIAL WORK DISCHARGE PLAN - Residence:  · Residence	home     Care Providers for Follow Up (Psychiatric Provider; PCP):  Care Providers for Follow up (PCP/Outpatient Provider) Ralf Quintana)  [facility]  [address] [place] [address]  Phone: [phone]  Fax: [phone]  Follow Up Time:       Aftercare Appointments:  · Agency Name	[facility]  · Appointment Date/Time	12-Aug-2019 08:00  · Address	[address] [place] [address]    ***  AT HOME ***  · Phone #	[phone]  · Purpose	PHP Follow Up     Alcohol/Substance Abuse Treatment and Referrals:  · Alcohol/Substance Use Referrals	yes...  · Alcohol/Substance Abuse Treatment Referrals	[facility]  · Other Referrals	yes...  · Other Referrals/Comments	[facility]/ [facility] SU[facility]     Crisis Plan:  1. If I Have The Following Problems:: Suicidal Thoughts  I Should:: Report to the nearest emergency room     Resources:  · Inpatient Psychiatric Unit - 24/7 phone #	[phone]  · National Suicide Prevention Lifeline	8 (656) 325-7320  · Does the patient have a ?	no     Advance Directive:  · Does patient have advance directives (both medical and psychiatric), or a surrogate decision maker?	no...  · Reason	pt declines     24 Hours Prior to Discharge:  · Caregiver Identified	yes...  · Caregiver Name and Contact Information	sister Lisette   	  · Caregiver notified of discharge/transfer	yes  · Patient education provided	yes  · Caregiver education provided	yes  · Comments	faxed to [facility] [phone] 8-9-19 12:00

## 2019-08-09 NOTE — DISCHARGE NOTE NURSING/CASE MANAGEMENT/SOCIAL WORK - NSDCPEWEB_GEN_ALL_CORE
[facility] for Tobacco Control website --- [email or web address] website --- [email or web address]

## 2019-08-09 NOTE — DISCHARGE NOTE NURSING/CASE MANAGEMENT/SOCIAL WORK - NSDCPEEMAIL_GEN_ALL_CORE
[facility] for Tobacco Control email [email or web address]

## 2019-08-09 NOTE — DISCHARGE NOTE BEHAVIORAL HEALTH - CARE PROVIDER_API CALL
Rafl Quintana)  [facility]  [address] [place] [address]  Phone: [phone]  Fax: [phone]  Follow Up Time:
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
## 2019-08-09 NOTE — DISCHARGE NOTE BEHAVIORAL HEALTH - PAST PSYCHIATRIC HISTORY
Per chart, patient has a history of Antisocial Personality Disorder, and he self-reports history of Bipolar Disorder, OCD, PTSD, and Schizoaffective Disorder - Bipolar Type.  Patient sees psychiatrist and received a shot recently but could not recall name. He has been on Haldol and Thorazine in the past.

## 2019-08-09 NOTE — DISCHARGE NOTE NURSING/CASE MANAGEMENT/SOCIAL WORK - NSDCDPATPORTLINK_GEN_ALL_CORE
You can access the [facility] Patient Portal, offered by [facility], by registering with the following website: [email or web address]

## 2019-08-09 NOTE — DISCHARGE NOTE BEHAVIORAL HEALTH - NSBHDCSUBSTHXFT_PSY_A_CORE
He reported Alcohol use of 24oz beer 1-2x/month, and last drink was about 1 month ago. Sister endorses that he is "not a substance user."

## 2019-08-09 NOTE — DISCHARGE NOTE BEHAVIORAL HEALTH - NSBHDCMEDSFT_PSY_A_CORE
Lexapro 10mg was ordered to help with his Depressed state.  His mood improved in a matter of days, suggesting that he's symptoms may have also been more related to life circumstances, and he may have felt better being back in a controlled and structured environment similar to that of [facility]. He denied any side effects from the medication.

## 2019-08-09 NOTE — DISCHARGE NOTE BEHAVIORAL HEALTH - NSBHDCRESPONSEFT_PSY_A_CORE
Patient responded well to treatment on the inpatient unit. He showed improvement in his mood and affect, and he had slight improvement in his mentation as he seemed more oriented and was socializing more with staff and patients on the unit.

## 2019-08-09 NOTE — DISCHARGE NOTE BEHAVIORAL HEALTH - NSTOBACCOREFERRAL_GEN_A_NCS
Yes Yes/Patient was registered and referred to the [place] Quits program. Phone appointment scheduled for 8/12/19 at 0900.

## 2019-08-09 NOTE — DISCHARGE NOTE BEHAVIORAL HEALTH - NSBHDCCONDITIONFT_PSY_A_CORE
Patient is in a good mood this morning, denying any feelings of depression, or thoughts of SI/I/P. He endorses still feeling confused, but is excited to leave and see his niece. He is also motivated to continue with his outpatient treatment following discharge.

## 2019-08-14 DIAGNOSIS — F32.9 MAJOR DEPRESSIVE DISORDER, SINGLE EPISODE, UNSPECIFIED: ICD-10-CM

## 2019-08-14 DIAGNOSIS — F17.200 NICOTINE DEPENDENCE, UNSPECIFIED, UNCOMPLICATED: ICD-10-CM

## 2019-08-14 DIAGNOSIS — M54.30 SCIATICA, UNSPECIFIED SIDE: ICD-10-CM

## 2019-08-14 DIAGNOSIS — Z91.19 PATIENT'S NONCOMPLIANCE WITH OTHER MEDICAL TREATMENT AND REGIMEN: ICD-10-CM

## 2019-08-14 DIAGNOSIS — F60.2 ANTISOCIAL PERSONALITY DISORDER: ICD-10-CM

## 2019-08-14 DIAGNOSIS — R45.851 SUICIDAL IDEATIONS: ICD-10-CM

## 2019-08-14 DIAGNOSIS — J45.909 UNSPECIFIED ASTHMA, UNCOMPLICATED: ICD-10-CM

## 2019-08-27 ENCOUNTER — APPOINTMENT (OUTPATIENT)
Dept: INTERNAL MEDICINE | Facility: CLINIC | Age: 49
End: 2019-08-27

## 2019-09-07 ENCOUNTER — EMERGENCY (EMERGENCY)
Facility: HOSPITAL | Age: 49
LOS: 1 days | Discharge: HOME | End: 2019-09-09
Attending: EMERGENCY MEDICINE | Admitting: EMERGENCY MEDICINE
Payer: MEDICAID

## 2019-09-07 VITALS
OXYGEN SATURATION: 98 % | TEMPERATURE: 98 F | DIASTOLIC BLOOD PRESSURE: 68 MMHG | HEART RATE: 100 BPM | SYSTOLIC BLOOD PRESSURE: 112 MMHG | HEIGHT: 68 IN | RESPIRATION RATE: 18 BRPM | WEIGHT: 179.9 LBS

## 2019-09-07 DIAGNOSIS — R45.851 SUICIDAL IDEATIONS: ICD-10-CM

## 2019-09-07 DIAGNOSIS — Z91.012 ALLERGY TO EGGS: ICD-10-CM

## 2019-09-07 DIAGNOSIS — Z98.890 OTHER SPECIFIED POSTPROCEDURAL STATES: Chronic | ICD-10-CM

## 2019-09-07 DIAGNOSIS — Z88.8 ALLERGY STATUS TO OTHER DRUGS, MEDICAMENTS AND BIOLOGICAL SUBSTANCES STATUS: ICD-10-CM

## 2019-09-07 DIAGNOSIS — F32.9 MAJOR DEPRESSIVE DISORDER, SINGLE EPISODE, UNSPECIFIED: ICD-10-CM

## 2019-09-07 PROCEDURE — 99285 EMERGENCY DEPT VISIT HI MDM: CPT

## 2019-09-07 PROCEDURE — 93010 ELECTROCARDIOGRAM REPORT: CPT

## 2019-09-07 NOTE — ED ADULT NURSE REASSESSMENT NOTE - NS ED NURSE REASSESS COMMENT FT1
Patient is with 1:1 . patient is calm at this moment . Patient is in gown ,belongings taken with security .

## 2019-09-07 NOTE — ED ADULT TRIAGE NOTE - CHIEF COMPLAINT QUOTE
pt ambulated to triage with c/o that he wants to kill himself pt states he wants to jump off a building

## 2019-09-08 DIAGNOSIS — F31.9 BIPOLAR DISORDER, UNSPECIFIED: ICD-10-CM

## 2019-09-08 DIAGNOSIS — F60.2 ANTISOCIAL PERSONALITY DISORDER: ICD-10-CM

## 2019-09-08 LAB
ALBUMIN SERPL ELPH-MCNC: 4.1 G/DL — SIGNIFICANT CHANGE UP (ref 3.5–5.2)
ALP SERPL-CCNC: 54 U/L — SIGNIFICANT CHANGE UP (ref 30–115)
ALT FLD-CCNC: 15 U/L — SIGNIFICANT CHANGE UP (ref 0–41)
ANION GAP SERPL CALC-SCNC: 14 MMOL/L — SIGNIFICANT CHANGE UP (ref 7–14)
APAP SERPL-MCNC: <5 UG/ML — LOW (ref 10–30)
AST SERPL-CCNC: 26 U/L — SIGNIFICANT CHANGE UP (ref 0–41)
BILIRUB SERPL-MCNC: 0.4 MG/DL — SIGNIFICANT CHANGE UP (ref 0.2–1.2)
BUN SERPL-MCNC: 14 MG/DL — SIGNIFICANT CHANGE UP (ref 10–20)
CALCIUM SERPL-MCNC: 9.1 MG/DL — SIGNIFICANT CHANGE UP (ref 8.5–10.1)
CHLORIDE SERPL-SCNC: 104 MMOL/L — SIGNIFICANT CHANGE UP (ref 98–110)
CO2 SERPL-SCNC: 22 MMOL/L — SIGNIFICANT CHANGE UP (ref 17–32)
CREAT SERPL-MCNC: 1 MG/DL — SIGNIFICANT CHANGE UP (ref 0.7–1.5)
ETHANOL SERPL-MCNC: 34 MG/DL — HIGH
GLUCOSE SERPL-MCNC: 119 MG/DL — HIGH (ref 70–99)
HCT VFR BLD CALC: 40.5 % — LOW (ref 42–52)
HGB BLD-MCNC: 14.4 G/DL — SIGNIFICANT CHANGE UP (ref 14–18)
MCHC RBC-ENTMCNC: 32.1 PG — HIGH (ref 27–31)
MCHC RBC-ENTMCNC: 35.6 G/DL — SIGNIFICANT CHANGE UP (ref 32–37)
MCV RBC AUTO: 90.2 FL — SIGNIFICANT CHANGE UP (ref 80–94)
NRBC # BLD: 0 /100 WBCS — SIGNIFICANT CHANGE UP (ref 0–0)
PLATELET # BLD AUTO: 145 K/UL — SIGNIFICANT CHANGE UP (ref 130–400)
POTASSIUM SERPL-MCNC: 3.4 MMOL/L — LOW (ref 3.5–5)
POTASSIUM SERPL-SCNC: 3.4 MMOL/L — LOW (ref 3.5–5)
PROT SERPL-MCNC: 6.3 G/DL — SIGNIFICANT CHANGE UP (ref 6–8)
RBC # BLD: 4.49 M/UL — LOW (ref 4.7–6.1)
RBC # FLD: 11.9 % — SIGNIFICANT CHANGE UP (ref 11.5–14.5)
SALICYLATES SERPL-MCNC: <0.3 MG/DL — LOW (ref 4–30)
SODIUM SERPL-SCNC: 140 MMOL/L — SIGNIFICANT CHANGE UP (ref 135–146)
WBC # BLD: 8.88 K/UL — SIGNIFICANT CHANGE UP (ref 4.8–10.8)
WBC # FLD AUTO: 8.88 K/UL — SIGNIFICANT CHANGE UP (ref 4.8–10.8)

## 2019-09-08 PROCEDURE — 90792 PSYCH DIAG EVAL W/MED SRVCS: CPT | Mod: GT

## 2019-09-08 RX ORDER — TRAMADOL HYDROCHLORIDE 50 MG/1
50 TABLET ORAL ONCE
Refills: 0 | Status: DISCONTINUED | OUTPATIENT
Start: 2019-09-08 | End: 2019-09-08

## 2019-09-08 RX ORDER — TRAMADOL HYDROCHLORIDE 50 MG/1
25 TABLET ORAL ONCE
Refills: 0 | Status: DISCONTINUED | OUTPATIENT
Start: 2019-09-08 | End: 2019-09-08

## 2019-09-08 RX ORDER — ESCITALOPRAM OXALATE 10 MG/1
10 TABLET, FILM COATED ORAL DAILY
Refills: 0 | Status: DISCONTINUED | OUTPATIENT
Start: 2019-09-08 | End: 2019-09-09

## 2019-09-08 RX ORDER — ESCITALOPRAM OXALATE 10 MG/1
10 TABLET, FILM COATED ORAL DAILY
Refills: 0 | Status: DISCONTINUED | OUTPATIENT
Start: 2019-09-09 | End: 2019-09-09

## 2019-09-08 RX ORDER — HALOPERIDOL DECANOATE 100 MG/ML
5 INJECTION INTRAMUSCULAR EVERY 6 HOURS
Refills: 0 | Status: DISCONTINUED | OUTPATIENT
Start: 2019-09-09 | End: 2019-09-09

## 2019-09-08 RX ADMIN — TRAMADOL HYDROCHLORIDE 50 MILLIGRAM(S): 50 TABLET ORAL at 00:00

## 2019-09-08 RX ADMIN — ESCITALOPRAM OXALATE 10 MILLIGRAM(S): 10 TABLET, FILM COATED ORAL at 17:39

## 2019-09-08 RX ADMIN — TRAMADOL HYDROCHLORIDE 50 MILLIGRAM(S): 50 TABLET ORAL at 15:13

## 2019-09-08 RX ADMIN — TRAMADOL HYDROCHLORIDE 25 MILLIGRAM(S): 50 TABLET ORAL at 11:53

## 2019-09-08 RX ADMIN — TRAMADOL HYDROCHLORIDE 25 MILLIGRAM(S): 50 TABLET ORAL at 10:13

## 2019-09-08 RX ADMIN — TRAMADOL HYDROCHLORIDE 50 MILLIGRAM(S): 50 TABLET ORAL at 17:52
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
## 2019-09-08 NOTE — ED BEHAVIORAL HEALTH NOTE - BEHAVIORAL HEALTH NOTE
CC: "I'm going to do it, i'm going to kill myself"    Interval History: Pt seen and examined. Chart reviewed. Per C.O., no overnight acute events. Upon approach, pt is calm and cooperative, states that he didn't sleep well because he has been having thoughts of wanting to jump off "a tall building"; does not specify location of building, states "I can't tell you where it is, it's my secret". Endorses poor appetite, and only had a pudding this morning for breakfast. States that he has been having suicidal thoughts since being discharged from [facility] 1 month ago, states that he didn't follow up with his outpatient provider upon discharge or take his medications because "I don't know how to do it, I get confused". States that while admitted to [facility], that "the doctors helped [him] because they cared and treated [him] like a human." States that last week, he went to a doctor, details unspecified whether a psychiatrist or medical doctor, states that they gave him a shot in both arms and also gave him a pill, unable to provide details on medication type or dose. States that the medication didn't make him feel better. States that he told the doctor at that time about his SI, however they stated that the medication would help him. States that he finally brought himself into the ED because he felt he was really going to do it due to "all my problems". Pt does not elaborate on what his problems are when prompted, rather he states that his mother passed away 14 years ago. Pt currently endorses visual hallucinations of "white dots floating around", as well as auditory hallucinations commanding him to kill himself; states he last heard them "when I had the thoughts"; evasive regarding details surrounding AH. Denies current HI. Denies other symptoms concerning for depression, kevin and psychosis.     Vitals:  Vital Signs Last 24 Hrs  T(C): 36.5 (08 Sep 2019 07:26), Max: 36.7 (07 Sep 2019 23:02)  T(F): 97.7 (08 Sep 2019 07:26), Max: 98 (07 Sep 2019 23:02)  HR: 69 (08 Sep 2019 07:26) (69 - 100)  BP: 113/70 (08 Sep 2019 07:26) (98/64 - 113/70)  BP(mean): --  RR: 18 (08 Sep 2019 07:26) (18 - 18)  SpO2: 98% (08 Sep 2019 07:26) (98% - 98%)    Mental status exam: Pt is calm and cooperative, in hospital gown. Hygiene Fair. Grooming fair. Reported mood "Not good". Affect depressed, constricted, congruent. Speech normal rate, rhythm, tone, articulation. Thought process linear. Thought content positive for suicidality; preoccupied with wanting to jump off of building to kill hismelf. Perceptions positive for auditory and visual hallucinations. Memory recent and remote: impaired. Insight: Fair. Judgement: Fair    Assessment:  Pt is a 48 year old single male on disability, unemployed, domiciled with sister, unclear psychiatric history but likely antisocial personality disorder, prior cocaine use, current intermittent alcohol use, denies other substances, no hx withdrawal seizures or DTs, reports prior history of "bipolar schizophrenia", history of SIB by cutting many years ago, history of aggression/violence, legal history significant for incarceration for bank robbery, arrests for stealing cars, violating parole, shoplifting, and disorderly conduct in [place], recent discharge from [facility] on 8/9/19, who presented self to ED with reported SI. Upon initial presentation in ED, the pt presented with linear thought process without signs or symptoms of being internally preoccupied or responding to stimuli, and would repeat thoughts of jumping of building without prompt and would not answer other questions pertaining to sxs of depression, hopelessness, helplessness, life stressors, current tx, or any ongoing substance misuse. Upon psychiatric reassessment today, the pt continues to be preoccupied with wanting to jump off a building to kill himself, however has linear thought process, and does not exhibit symptoms concerning for internal preoccupation.     Plan:  - Continue as behavioral health hold until further collateral information can be obtained  - No acute psychiatric interventions recommended at this time  - Continue 1:1 constant observation for safety     Attending note to follow CC: "I'm going to do it, i'm going to kill myself"    Interval History: Pt seen and examined. Chart reviewed. Per C.O., no overnight acute events. Upon approach, pt is calm and cooperative, states that he didn't sleep well because he has been having thoughts of wanting to jump off "a tall building"; does not specify location of building, states "I can't tell you where it is, it's my secret". Endorses poor appetite, and only had a pudding this morning for breakfast. States that he has been having suicidal thoughts since being discharged from [facility] 1 month ago, states that he didn't follow up with his outpatient provider upon discharge or take his medications because "I don't know how to do it, I get confused". States that while admitted to [facility], that "the doctors helped [him] because they cared and treated [him] like a human." States that last week, he went to a doctor, details unspecified whether a psychiatrist or medical doctor, states that they gave him a shot in both arms and also gave him a pill, unable to provide details on medication type or dose. States that the medication didn't make him feel better. States that he told the doctor at that time about his SI, however they stated that the medication would help him. States that he finally brought himself into the ED because he felt he was really going to do it due to "all my problems". Pt does not elaborate on what his problems are when prompted, rather he states that his mother passed away 14 years ago. Pt currently endorses visual hallucinations of "white dots floating around", as well as auditory hallucinations commanding him to kill himself; states he last heard them "when I had the thoughts"; evasive regarding details surrounding AH. Denies current HI. Denies other symptoms concerning for depression, kevin and psychosis.     Vitals:  Vital Signs Last 24 Hrs  T(C): 36.5 (08 Sep 2019 07:26), Max: 36.7 (07 Sep 2019 23:02)  T(F): 97.7 (08 Sep 2019 07:26), Max: 98 (07 Sep 2019 23:02)  HR: 69 (08 Sep 2019 07:26) (69 - 100)  BP: 113/70 (08 Sep 2019 07:26) (98/64 - 113/70)  BP(mean): --  RR: 18 (08 Sep 2019 07:26) (18 - 18)  SpO2: 98% (08 Sep 2019 07:26) (98% - 98%)    Mental status exam: Pt is calm and cooperative, in hospital gown. Hygiene Fair. Grooming fair. Reported mood "Not good". Affect depressed, constricted, congruent. Speech normal rate, rhythm, tone, articulation. Thought process linear. Thought content positive for suicidality; preoccupied with wanting to jump off of building to kill himself. Perceptions positive for auditory and visual hallucinations. Memory recent and remote: impaired. Insight: Fair. Judgement: Fair    Assessment:  Pt is a 48 year old single male on disability, unemployed, domiciled with sister, unclear psychiatric history but likely antisocial personality disorder, prior cocaine use, current intermittent alcohol use, denies other substances, no hx withdrawal seizures or DTs, reports prior history of "bipolar schizophrenia", history of SIB by cutting many years ago, history of aggression/violence, legal history significant for incarceration for bank robbery, arrests for stealing cars, violating parole, shoplifting, and disorderly conduct in [place], recent discharge from [facility] on 8/9/19, who presented self to ED with reported SI. Upon initial presentation in ED, the pt presented with linear thought process without signs or symptoms of being internally preoccupied or responding to stimuli, and would repeat thoughts of jumping of building without prompt and would not answer other questions pertaining to sxs of depression, hopelessness, helplessness, life stressors, current tx, or any ongoing substance misuse. Upon psychiatric reassessment today, the pt continues to be preoccupied with wanting to jump off a building to kill himself, however has linear thought process, and does not exhibit symptoms concerning for internal preoccupation. However, pt is now endorsing auditory hallucinations commanding him to kill himself, as well as visual hallucinations which was not previously endorsed upon initial assessment. Pt's vague and inconsistent history and symptoms are concerning for possible malingering due to secondary gain. However, at this time it is necessary to obtain collateral information from the pt's sister as the pt was discharged to [facility] upon his last IPP discharge, and thus it is necessary to get in contact with her to better understand the pt's current functionality.    Plan:  - Continue as behavioral health hold until further collateral information can be obtained  - No acute psychiatric interventions recommended at this time  - Continue 1:1 constant observation for safety     Attending note to follow CC: "I'm going to do it, i'm going to kill myself"    Interval History: Pt seen and examined. Chart reviewed. Per C.O., no overnight acute events. Upon approach, pt is calm and cooperative, states that he didn't sleep well because he has been having thoughts of wanting to jump off "a tall building"; does not specify location of building, states "I can't tell you where it is, it's my secret". Endorses poor appetite, and only had a pudding this morning for breakfast. States that he has been having suicidal thoughts since being discharged from [facility] 1 month ago, states that he didn't follow up with his outpatient provider upon discharge or take his medications because "I don't know how to do it, I get confused". States that while admitted to [facility], that "the doctors helped [him] because they cared and treated [him] like a human." States that last week, he went to a doctor, details unspecified whether a psychiatrist or medical doctor, states that they gave him a shot in both arms and also gave him a pill, unable to provide details on medication type or dose. States that the medication didn't make him feel better. States that he told the doctor at that time about his SI, however they stated that the medication would help him. States that he finally brought himself into the ED because he felt he was really going to do it due to "all my problems". Pt does not elaborate on what his problems are when prompted, rather he states that his mother passed away 14 years ago. Pt currently endorses visual hallucinations of "white dots floating around", as well as auditory hallucinations commanding him to kill himself; states he last heard them "when I had the thoughts"; evasive regarding details surrounding AH. Denies current HI. Denies other symptoms concerning for depression, kevin and psychosis.     Vitals:  Vital Signs Last 24 Hrs  T(C): 36.5 (08 Sep 2019 07:26), Max: 36.7 (07 Sep 2019 23:02)  T(F): 97.7 (08 Sep 2019 07:26), Max: 98 (07 Sep 2019 23:02)  HR: 69 (08 Sep 2019 07:26) (69 - 100)  BP: 113/70 (08 Sep 2019 07:26) (98/64 - 113/70)  BP(mean): --  RR: 18 (08 Sep 2019 07:26) (18 - 18)  SpO2: 98% (08 Sep 2019 07:26) (98% - 98%)    Mental status exam: Pt is calm and cooperative, in hospital gown. Hygiene Fair. Grooming fair. Reported mood "Not good". Affect depressed, constricted, congruent. Speech normal rate, rhythm, tone, articulation. Thought process linear. Thought content positive for suicidality; preoccupied with wanting to jump off of building to kill himself. Perceptions positive for auditory and visual hallucinations. Memory recent and remote: impaired. Insight: Fair. Judgement: Fair    Assessment:  Pt is a 48 year old single male on disability, unemployed, domiciled with sister, unclear psychiatric history but likely antisocial personality disorder, prior cocaine use, current intermittent alcohol use, denies other substances, no hx withdrawal seizures or DTs, reports prior history of "bipolar schizophrenia", history of SIB by cutting many years ago, history of aggression/violence, legal history significant for incarceration for bank robbery, arrests for stealing cars, violating parole, shoplifting, and disorderly conduct in [place], recent discharge from [facility] on 8/9/19, who presented self to ED with reported SI. Upon initial presentation in ED, the pt presented with linear thought process without signs or symptoms of being internally preoccupied or responding to stimuli, and would repeat thoughts of jumping of building without prompt and would not answer other questions pertaining to sxs of depression, hopelessness, helplessness, life stressors, current tx, or any ongoing substance misuse. Upon psychiatric reassessment today, the pt continues to be preoccupied with wanting to jump off a building to kill himself, however has linear thought process, and does not exhibit symptoms concerning for internal preoccupation. However, pt is now endorsing auditory hallucinations commanding him to kill himself, as well as visual hallucinations which was not previously endorsed upon initial assessment. At this time, it is necessary to get in contact with her to better understand events leading to pt's current ED visit, as well a overall daily functionality.     Plan:  - Continue as behavioral health hold until further collateral information can be obtained  - No acute psychiatric interventions recommended at this time  - Continue 1:1 constant observation for safety     Attending note to follow CC: "I'm going to do it, i'm going to kill myself"    Interval History: Pt seen and examined. Chart reviewed. Per C.O., no overnight acute events. Upon approach, pt is calm and cooperative, states that he didn't sleep well because he has been having thoughts of wanting to jump off "a tall building"; does not specify location of building, states "I can't tell you where it is, it's my secret". Endorses poor appetite, and only had a pudding this morning for breakfast. States that he has been having suicidal thoughts since being discharged from [facility] 1 month ago, states that he didn't follow up with his outpatient provider upon discharge or take his medications because "I don't know how to do it, I get confused". States that while admitted to [facility], that "the doctors helped [him] because they cared and treated [him] like a human." States that last week, he went to a doctor, details unspecified whether a psychiatrist or medical doctor, states that they gave him a shot in both arms and also gave him a pill, unable to provide details on medication type or dose. States that the medication didn't make him feel better. States that he told the doctor at that time about his SI, however they stated that the medication would help him. States that he finally brought himself into the ED because he felt he was really going to do it due to "all my problems". Pt does not elaborate on what his problems are when prompted, rather he states that his mother passed away 14 years ago. Pt currently endorses visual hallucinations of "white dots floating around", as well as auditory hallucinations commanding him to kill himself; states he last heard them "when I had the thoughts"; evasive regarding details surrounding AH. Denies current HI. Denies other symptoms concerning for depression, kevin and psychosis.     Per collateral information, pt's sister Lisette ([phone]), the pt has been living with her since his last discharge however she asked the pt to leave her apartment a few days ago because her landlord didn't want another tenant in the apartment. States that the pt has been "in and out of the apartment since", however she states that he is not able to come back to live with her. States the pt has been endorsing wanting to jump off a building since May 2019, states that she has acute safety concerns for the pt at this time, reports she is concerned that the pt has suicidal intent. Reports pt has not been compliant with medications since discharge as she didn't' even know the pt was suppose to be taking medications.     Vitals:  Vital Signs Last 24 Hrs  T(C): 36.5 (08 Sep 2019 07:26), Max: 36.7 (07 Sep 2019 23:02)  T(F): 97.7 (08 Sep 2019 07:26), Max: 98 (07 Sep 2019 23:02)  HR: 69 (08 Sep 2019 07:26) (69 - 100)  BP: 113/70 (08 Sep 2019 07:26) (98/64 - 113/70)  BP(mean): --  RR: 18 (08 Sep 2019 07:26) (18 - 18)  SpO2: 98% (08 Sep 2019 07:26) (98% - 98%)    Mental status exam: Pt is calm and cooperative, in hospital gown. Hygiene Fair. Grooming fair. Reported mood "Not good". Affect depressed, constricted, congruent. Speech normal rate, rhythm, tone, articulation. Thought process linear. Thought content positive for suicidality; preoccupied with wanting to jump off of building to kill himself. Perceptions positive for auditory and visual hallucinations. Memory recent and remote: impaired. Insight: Fair. Judgement: Fair    Assessment:  Pt is a 48 year old single male on disability, unemployed, domiciled with sister, unclear psychiatric history but likely antisocial personality disorder, prior cocaine use, current intermittent alcohol use, denies other substances, no hx withdrawal seizures or DTs, reports prior history of "bipolar schizophrenia", history of SIB by cutting many years ago, history of aggression/violence, legal history significant for incarceration for bank robbery, arrests for stealing cars, violating parole, shoplifting, and disorderly conduct in [place], recent discharge from [facility] on 8/9/19, who presented self to ED with reported SI. Upon initial presentation in ED, the pt presented with linear thought process without signs or symptoms of being internally preoccupied or responding to stimuli, and would repeat thoughts of jumping of building without prompt and would not answer other questions pertaining to sxs of depression, hopelessness, helplessness, life stressors, current tx, or any ongoing substance misuse. Upon psychiatric reassessment today, the pt continues to be preoccupied with wanting to jump off a building to kill himself, however has linear thought process, and does not exhibit symptoms concerning for internal preoccupation. However, pt is now endorsing auditory hallucinations commanding him to kill himself, as well as visual hallucinations which was not previously endorsed upon initial assessment. At this time, pt appears to have an acute decompensation of underlying depression with suicidal ideations with plan in context of med noncopmliance, and multiple psychosocial stressors including recent homelessness. In light of collateral information and pt's current mood episode, the pt is an imminent risk to self at this time warranting emergent IPP admission for safety, stabilization and medication management. Per the pt's last [facility] discharge note, the pt appeared to have improved upon starting lexapro 10mg, and thus will start pt on this medication dose.     Plan:  - Admit to [facility] inpatient psychiatric unit under emergent 9.39 status  - Continue 1:1 constant observation for safety   - Start Lexapro 10mg po qd for depression  - For agitation not agreeable to verbal redirection, give haldol 5mg po q6h and ativan 2mg po q6h with escalation to IM if pt is not agreeable to oral meds.     Attending note to follow. CC: "I'm going to do it, i'm going to kill myself"    Interval History: Pt seen and examined. Chart reviewed. Per C.O., no overnight acute events. Upon approach, pt is calm and cooperative, states that he didn't sleep well because he has been having thoughts of wanting to jump off "a tall building"; does not specify location of building, states "I can't tell you where it is, it's my secret". Endorses poor appetite, and only had a pudding this morning for breakfast. States that he has been having suicidal thoughts since being discharged from [facility] 1 month ago, states that he didn't follow up with his outpatient provider upon discharge or take his medications because "I don't know how to do it, I get confused". States that while admitted to [facility], that "the doctors helped [him] because they cared and treated [him] like a human." States that last week, he went to a doctor, details unspecified whether a psychiatrist or medical doctor, states that they gave him a shot in both arms and also gave him a pill, unable to provide details on medication type or dose. States that the medication didn't make him feel better. States that he told the doctor at that time about his SI, however they stated that the medication would help him. States that he finally brought himself into the ED because he felt he was really going to do it due to "all my problems". Pt does not elaborate on what his problems are when prompted, rather he states that his mother passed away 14 years ago. Pt currently endorses visual hallucinations of "white dots floating around", as well as auditory hallucinations commanding him to kill himself; states he last heard them "when I had the thoughts"; evasive regarding details surrounding AH. Denies current HI. Denies other symptoms concerning for depression, kevin and psychosis.     Per collateral information, pt's sister Lisette ([phone]), the pt has been living with her since his last discharge however she asked the pt to leave her apartment a few days ago because her landlord didn't want another tenant in the apartment. States that the pt has been "in and out of the apartment since", however she states that he is not able to come back to live with her. States the pt has been endorsing wanting to jump off a building since May 2019, states that she has acute safety concerns for the pt at this time, reports she is concerned that the pt has suicidal intent. Reports pt has not been compliant with medications since discharge as she didn't' even know the pt was suppose to be taking medications.     Vitals:  Vital Signs Last 24 Hrs  T(C): 36.5 (08 Sep 2019 07:26), Max: 36.7 (07 Sep 2019 23:02)  T(F): 97.7 (08 Sep 2019 07:26), Max: 98 (07 Sep 2019 23:02)  HR: 69 (08 Sep 2019 07:26) (69 - 100)  BP: 113/70 (08 Sep 2019 07:26) (98/64 - 113/70)  BP(mean): --  RR: 18 (08 Sep 2019 07:26) (18 - 18)  SpO2: 98% (08 Sep 2019 07:26) (98% - 98%)    Mental status exam: Pt is calm and cooperative, in hospital gown. Hygiene Fair. Grooming fair. Reported mood "Not good". Affect depressed, constricted, congruent. Speech normal rate, rhythm, tone, articulation. Thought process linear. Thought content positive for suicidality; preoccupied with wanting to jump off of building to kill himself. Perceptions positive for auditory and visual hallucinations. Memory recent and remote: impaired. Insight: Fair. Judgement: Fair    Assessment:  Pt is a 48 year old single male on disability, unemployed, domiciled with sister, unclear psychiatric history but likely antisocial personality disorder, prior cocaine use, current intermittent alcohol use, denies other substances, no hx withdrawal seizures or DTs, reports prior history of "bipolar schizophrenia", history of SIB by cutting many years ago, history of aggression/violence, legal history significant for incarceration for bank robbery, arrests for stealing cars, violating parole, shoplifting, and disorderly conduct in [place], recent discharge from [facility] on 8/9/19, who presented self to ED with reported SI. Upon initial presentation in ED, the pt presented with linear thought process without signs or symptoms of being internally preoccupied or responding to stimuli, and would repeat thoughts of jumping of building without prompt and would not answer other questions pertaining to sxs of depression, hopelessness, helplessness, life stressors, current tx, or any ongoing substance misuse. Upon psychiatric reassessment today, the pt continues to be preoccupied with wanting to jump off a building to kill himself, however has linear thought process, and does not exhibit symptoms concerning for internal preoccupation. However, pt is now endorsing auditory hallucinations commanding him to kill himself, as well as visual hallucinations which was not previously endorsed upon initial assessment. At this time, pt appears to have an acute decompensation of underlying depression with suicidal ideations with intent and plan in context of med noncompliance, and multiple psychosocial stressors including recent homelessness. In light of collateral information and pt's current mood episode, the pt is an imminent risk to self at this time warranting emergent IPP admission for safety, stabilization and medication management. Per the pt's last [facility] discharge note, the pt appeared to have improved upon starting lexapro 10mg, and thus will start pt on this medication dose.     Plan:  - Admit to [facility] inpatient psychiatric unit under emergent 9.39 status  - Continue 1:1 constant observation for safety   - Start Lexapro 10mg po qd for depression  - For agitation not agreeable to verbal redirection, give haldol 5mg po q6h and ativan 2mg po q6h with escalation to IM if pt is not agreeable to oral meds.     Attending note to follow.

## 2019-09-08 NOTE — ED BEHAVIORAL HEALTH ASSESSMENT NOTE - SUICIDE PROTECTIVE FACTORS
Future oriented/Positive therapeutic relationships/Responsibility to family and others/Supportive social network or family

## 2019-09-08 NOTE — ED PROVIDER NOTE - OBJECTIVE STATEMENT
hx of depression schizo affective, ocd, who presents for suicidal ideation with feelign of jumponig off building. he reports etoh use earlier today. he states he has a therapist and he gets IM shots for illness but can not tell me the name of meds.

## 2019-09-08 NOTE — ED BEHAVIORAL HEALTH ASSESSMENT NOTE - DIFFERENTIAL
bipolar disorder, depressed  antisocial personality disorder  r/o malingering/secondary gain  r/o substance induced mood disorder
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
## 2019-09-08 NOTE — ED BEHAVIORAL HEALTH ASSESSMENT NOTE - HPI (INCLUDE ILLNESS QUALITY, SEVERITY, DURATION, TIMING, CONTEXT, MODIFYING FACTORS, ASSOCIATED SIGNS AND SYMPTOMS)
Pt is a 48 year old single male on disability, unemployed, domiciled with sister, unclear psychiatric history but likely antisocial personality disorder, prior cocaine use, current intermittent alcohol use, denies other substances, no hx withdrawal seizures or DTs, reports prior history of "bipolar schizophrenia", history of SIB by cutting many years ago, history of aggression/violence, legal history significant for incarceration for bank robbery, arrests for stealing cars, violating parole, shoplifting, and disorderly conduct in [place], recent discharge from [facility] on 8/9/19, who presented self to ED with reported SI.    Chart and records were reviewed, including hospital course of recent hospitalization.  Pt carries a hx of suspected malingering/secondary gain as documented in prior ED assessments.   Per staff pt has been cooperative since arrival.  Pt presented guarded and vague, often irritable when asked specific or detailed questions. He reports he took a bus to come to the ED and "I don't feel right", says that he "may just do it" and clarifies thoughts of entering a building with ideations of jumping out of the building with suicidal plans. Pt says he has "lots of problems going on", and when asked to clarify or discuss specifics, he becomes irritable and repeats "a lot of problems" without clarification. Otherwise offers little information including description of mood, interjects and says "I may just do it this time", with little clarity. He otherwise denied thoughts of wanting to harm others, and did not report any audiovisual h/d or any other perceptual disturbances. Denies any hx of substance misuse contrary to documented hx, denied alcohol consumption (though BAL was 34 on arrival), and when brought to his attention says "yea I had a few beers today". He says he follows with therapist and psychiatrist at [facility], however unable to provide provider name. Says he gets "a shot" from the doctor, is unsure of what the shot is or what it is for "may depression" and says he recently received this but could not clarify date (has made similar statements in his recent ED visits). He was contradictory about medication, says he is on 'pills', however when asked about which pharmacy he fills medications in then denied being on medication.  Pt refused to provide collateral information, including clinic details.

## 2019-09-08 NOTE — ED PROVIDER NOTE - CLINICAL SUMMARY MEDICAL DECISION MAKING FREE TEXT BOX
Pt with depression and SI. Psyc evaluated pt, recommend [facility] for now. Pt with depression and SI. Psyc evaluated pt, recommend [facility] for now. Pt re-evaluated by psychiatry attending 9/9, cleared for discharged. No SI/HI. Results reviewed and discussed with pt and printed for patient. Anticipatory guidance given including close outpatient followup. Strict return precautions given. Pt verbalizes understanding of and agrees with plan.
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
## 2019-09-08 NOTE — ED ADULT NURSE NOTE - NSIMPLEMENTINTERV_GEN_ALL_ED
Implemented All Universal Safety Interventions:  [place] to call system. Call bell, personal items and telephone within reach. Instruct patient to call for assistance. Room bathroom lighting operational. Non-slip footwear when patient is off stretcher. Physically safe environment: no spills, clutter or unnecessary equipment. Stretcher in lowest position, wheels locked, appropriate side rails in place.

## 2019-09-08 NOTE — ED BEHAVIORAL HEALTH ASSESSMENT NOTE - SUMMARY
48 year old single male on disability, unemployed, domiciled with sister, unclear psychiatric history but likely antisocial personality disorder, prior cocaine use, current intermittent alcohol use, denies other substances, no hx withdrawal seizures or DTs, reports prior history of "bipolar schizophrenia", history of SIB by cutting many years ago, history of aggression/violence, legal history significant for incarceration for bank robbery, arrests for stealing cars, violating parole, shoplifting, and disorderly conduct in [place], most recently discharged from inpatient psych unit at [facility] on 8/9/19, who walked in to ED complaining of suicidal thoughts.  Pt carries a hx significant for antisocial PD, and prior hx of suspected malingering and seeking secondary gains. He reports suicidal thoughts of wanting to jump off of a building, however was vague and dismissive through interview, and became irritable when asked to elaborate sxs, and often was inconsistent or contradictory. His presentation was quite similar in complaint to recent visits to the ED. He does carry a hx of prior psychiatric hospitalizations, and says he receives outpatient therapy and an intramuscular injection for 'depression', however cannot provide additional details and this could not be corroborated at this time. His gross presentation was linear and he did not present internally preoccupied or responding to stimuli, and would repeat thoughts of jumping of building without prompt and would not answer other questions pertaining to sxs of depression, hopelessness, helplessness, life stressors, current tx, or any ongoing substance misuse.    At present time it would be beneficial to obtain further collateral and treatment details, and to observe patient and reassess.

## 2019-09-08 NOTE — ED BEHAVIORAL HEALTH ASSESSMENT NOTE - AXIS IV
Problem related to social environment/Occupational problems/Problems with interaction with legal system

## 2019-09-08 NOTE — ED PROVIDER NOTE - PHYSICAL EXAMINATION
CONSTITUTIONAL: Well-developed; well-nourished; in no acute distress, nontoxic appearing  SKIN: skin exam is warm and dry,  HEAD: Normocephalic; atraumatic.  EYES: PERRL, 3 mm bilateral, no nystagmus, EOM intact; conjunctiva and sclera clear.  ENT: MMM, no nasal congestion  NECK: Supple; non tender.  ROM intact.  CARD: S1, S2 normal, no murmur  RESP: No wheezes, rales or rhonchi. Good air movement bilaterally  ABD: soft; non-distended; non-tender. No Rebound, No guarding  EXT: Normal ROM. No cyanosis or edema. Dp Pulses intact.   NEURO: awake, alert, following commands, oriented, grossly unremarkable. No Focal deficits. GCS 15.   PSYCH: Cooperative, flat affect, depressed. active SI

## 2019-09-08 NOTE — ED ADULT NURSE NOTE - OBJECTIVE STATEMENT
c/o suicidal thoughts /. Patient came with a plan of jumping from a building . Patient is calm , not maintaining eye contact

## 2019-09-08 NOTE — ED PROVIDER NOTE - PATIENT PORTAL LINK FT
You can access the FollowMyHealth Patient Portal offered by [facility] by registering at the following website: [email or web address]. By joining [facility]’s FollowMyHealth portal, you will also be able to view your health information using other applications (apps) compatible with our system.

## 2019-09-08 NOTE — ED PROVIDER NOTE - NSFOLLOWUPCLINICS_GEN_ALL_ED_FT
[facility]  OP Mental Health  [address] [place] [id number]  Phone: [phone]  Fax:   Follow Up Time: 1-3 Days
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
## 2019-09-08 NOTE — ED BEHAVIORAL HEALTH ASSESSMENT NOTE - OTHER PAST PSYCHIATRIC HISTORY (INCLUDE DETAILS REGARDING ONSET, COURSE OF ILLNESS, INPATIENT/OUTPATIENT TREATMENT)
Pt was discharged from [facility] on 8/9/19 on lexapro 10mg daily.  Reports follows a psychiatrist  for bipolar and schizophrenia. Current outpatient treatment from [facility] (??) (next to [place]) saw a new psychiatrist twice. Got an injection a few days ago but isn't helping.

## 2019-09-08 NOTE — ED PROVIDER NOTE - PROGRESS NOTE DETAILS
tele psych consulted. spoke to tele psych, recommends reevaluation in am by psych for collaborative history s/o from [facility], awaitng psych eval in AM s/o Dr. Amaya Pt evaluated after sign out. Sleeping comfortably. Pending psyc consult. Authored by Dr Evans: The patient was endorsed to me by Dr Fonseca to observe overnight until a bed becomes available for patient in IPP.  He is currently sleeping, will continue 1:1, no issues so far. No event overnight, case endorsed to  Podlog. PODLOG: Pt resting comfortably. Discussed with psychiatry, cleared for discharged.

## 2019-09-09 VITALS
SYSTOLIC BLOOD PRESSURE: 100 MMHG | DIASTOLIC BLOOD PRESSURE: 60 MMHG | HEART RATE: 55 BPM | RESPIRATION RATE: 18 BRPM | TEMPERATURE: 96 F

## 2019-09-09 PROCEDURE — 90792 PSYCH DIAG EVAL W/MED SRVCS: CPT | Mod: GC

## 2019-09-09 RX ORDER — TRAMADOL HYDROCHLORIDE 50 MG/1
50 TABLET ORAL ONCE
Refills: 0 | Status: DISCONTINUED | OUTPATIENT
Start: 2019-09-09 | End: 2019-09-09

## 2019-09-09 RX ADMIN — TRAMADOL HYDROCHLORIDE 50 MILLIGRAM(S): 50 TABLET ORAL at 09:50

## 2019-09-09 NOTE — ED BEHAVIORAL HEALTH NOTE - BEHAVIORAL HEALTH NOTE
Patient was seen and examined, and chart was reviewed and referred to.  Patient jointly assessed with attending psychiatrist.    Patient appears with no acute mood disturbance. He does make suicidal comments, which appear to be chronic and in association with his housing instability, but not indicative of imminent risk. Patient utilizes services at [facility] and is agreeable to continue in the future.    Mental Status significant for no acute signs of kevin or psychosis; fully oriented; no acute mood disturbance.    Plan:  No current indication for IPP.  Follow up with [facility]. Patient was seen and examined, and chart was reviewed and referred to.  Patient jointly assessed with attending psychiatrist.    Patient appears with no acute mood disturbance. He does make suicidal comments, which appear to be chronic and in association with his housing instability, but not indicative of imminent risk. Patient utilizes services at [facility] and is agreeable to continue in the future.    Mental Status significant for no acute signs of kevin or psychosis; fully oriented; no acute mood disturbance.    Plan:  No current indication for IPP.  Follow up with [facility].    I agree with above A/P.

## 2020-03-04 NOTE — ED BEHAVIORAL HEALTH ASSESSMENT NOTE - DOMICILE TYPE
Private Residence - Continue home clobetasol   - Continue home levocetirizine: therapeutic interchange loratidine

## 2021-01-12 NOTE — DISCHARGE NOTE BEHAVIORAL HEALTH - SMOKING EVEN A SINGLE PUFF INCREASES THE LIKELIHOOD OF A FULL RELAPSE, WITHDRAWAL SYMPTOMS PEAK WITHIN 1-2 WEEKS, BUT CAN PERSIST FOR MONTHS
Goal Outcome Evaluation:  Plan of Care Reviewed With: patient, daughter  Progress: declining  Outcome Summary: Per report, pt coded on 3rd floor last night. Intubated and lines placed prior to starting targeted temperature management. Continous EEG running all shift. Per neurology, pt was in status until after Depakote infused. Keppra also given. Pt maxed out on Norepi, EPI, and Phenylephrine gtts. No plans to add more vasoactive drugs per intensivist. Just over 100ml UOP today. Versed at 10mg/hr, Propofol at 40mcg/kg/min. Dr. Quintanilla spoke w/ family today. Pt is a DNR. Near end of shift pts blood pressures would periodically spike up to 130's; then return to about 113 SBP. Hands are more dusky. Overall his edema is worse but face is signifiantly more swollen. Family is aware of poor prognosis. House supervisor has cleared two family members to be in-house d/t pts prognosis and families long distance from facility. Plan is to begin rewarming the pt at 0400.   Statement Selected

## 2021-05-24 NOTE — ED BEHAVIORAL HEALTH ASSESSMENT NOTE - DANGER TO SELF; MENTAL ILLNESS EXPECTED TO RESPOND TO INPATIENT CARE
Pt transported to IR via stretcher w IR CARMELLA Alfaro  05/24/21   [place] [id number] [facility]  05/24/21 [id number] Suicidal behavior

## 2021-05-30 NOTE — PROGRESS NOTE BEHAVIORAL HEALTH - THOUGHT PROCESS
Received bedside report and accepted care of patient. Patient currently resting in bed in no visible or stated signs of distress. Bed alarm in place, controls on and bed in locked and lowest position. Call light and personal possessions within reach. Chest tube in place.   
Linear

## 2021-10-17 ENCOUNTER — EMERGENCY (EMERGENCY)
Facility: HOSPITAL | Age: 51
LOS: 0 days | Discharge: LEFT BEFORE DISCHARGE | End: 2021-10-17
Attending: STUDENT IN AN ORGANIZED HEALTH CARE EDUCATION/TRAINING PROGRAM | Admitting: STUDENT IN AN ORGANIZED HEALTH CARE EDUCATION/TRAINING PROGRAM
Payer: MEDICAID

## 2021-10-17 VITALS
SYSTOLIC BLOOD PRESSURE: 138 MMHG | DIASTOLIC BLOOD PRESSURE: 83 MMHG | HEIGHT: 68 IN | WEIGHT: 179.9 LBS | TEMPERATURE: 98 F | OXYGEN SATURATION: 98 % | RESPIRATION RATE: 18 BRPM | HEART RATE: 125 BPM

## 2021-10-17 DIAGNOSIS — F10.929 ALCOHOL USE, UNSPECIFIED WITH INTOXICATION, UNSPECIFIED: ICD-10-CM

## 2021-10-17 DIAGNOSIS — F20.9 SCHIZOPHRENIA, UNSPECIFIED: ICD-10-CM

## 2021-10-17 DIAGNOSIS — Z98.890 OTHER SPECIFIED POSTPROCEDURAL STATES: Chronic | ICD-10-CM
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
## 2021-10-17 DIAGNOSIS — Z88.6 ALLERGY STATUS TO ANALGESIC AGENT: ICD-10-CM

## 2021-10-17 DIAGNOSIS — Z91.013 ALLERGY TO SEAFOOD: ICD-10-CM

## 2021-10-17 DIAGNOSIS — Z53.29 PROCEDURE AND TREATMENT NOT CARRIED OUT BECAUSE OF PATIENT'S DECISION FOR OTHER REASONS: ICD-10-CM

## 2021-10-17 DIAGNOSIS — Z91.012 ALLERGY TO EGGS: ICD-10-CM

## 2021-10-17 PROBLEM — F42.9 OBSESSIVE-COMPULSIVE DISORDER, UNSPECIFIED: Chronic | Status: ACTIVE | Noted: 2019-09-08

## 2021-10-17 PROCEDURE — L9997: CPT

## 2021-10-17 NOTE — ED PROVIDER NOTE - ATTENDING CONTRIBUTION TO CARE
50 year old male with a pmh of OCD & schizophrenia biba for intox. Patient endorses drinking 2 cans of beer no other drugs on trauma no si/hi auditory or visual hallucinations. Patient requesting to leave.  on exam  CONSTITUTIONAL: WA / WN / NAD  HEAD: NCAT  EYES: PERRL; EOMI;   ENT: Normal pharynx; mucous membranes pink/moist, no erythema.  NECK: Supple; no meningeal signs  MSK/EXT: No gross deformities; full range of motion.  SKIN: Warm and dry;   NEURO: AAOx3, Ambulating with steady gait.   PSYCH: Memory Intact, Normal Affect

## 2021-10-17 NOTE — ED PROVIDER NOTE - CHILD ABUSE FACILITY
PROCEDURE:  CT Abdomen and Pelvis without intravenous contrast



HISTORY:

right flank pain



COMPARISON:

None.



TECHNIQUE:

Helical CT of the abdomen and pelvis was performed without oral or 

intravenous contrast as per referring physician request.



Contrast Dose: None



Radiation dose:



Total exam DLP = 846.35 mGy-cm.



This CT exam was performed using one or more of the following dose 

reduction techniques: Automated exposure control, adjustment of the 

mA and/or kV according to patient size, and/or use of iterative 

reconstruction technique.



FINDINGS:



LOWER THORAX:

Unremarkable. 



LIVER:

Unremarkable. No gross lesion or ductal dilatation.  



GALLBLADDER AND BILE DUCTS:

Unremarkable. 



PANCREAS:

Unremarkable. No gross lesion or ductal dilatation.



SPLEEN:

Unremarkable. 



ADRENALS:

Unremarkable. No mass. 



KIDNEYS AND URETERS:

A 4.5 mm linear calculus obstructs the distal right ureter proximal 

to the right uterus with junction with mild right hydroureter evident 

but no definite hydronephrosis at this time.  Trace periureteral 

reaction appreciated distally. No definite left sided obstructive 

uropathy. A 5 mm linear intrarenal calculus identified at the mid to 

lower pole right kidney with no radiodense calculus identified at the 

right. The urinary bladder is mildly distended but otherwise 

unremarkable appearing. 



VASCULATURE:

Unremarkable. No aortic aneurysm. 



BOWEL:

Evaluation is limited to lack of oral and intravenous contrast with 

limited sigmoid diverticulosis is evident without acute changes.  

Collapse of the rectosigmoid limits evaluation of the wall with 

thickening likely a function of collapse though other etiologies are 

difficult to completely exclude but not are not favored. 



APPENDIX:

Unremarkable. Normal appendix. 



PERITONEUM:

Unremarkable. No free fluid. No free air. 



LYMPH NODES:

Unremarkable. No enlarged lymph nodes. 



BLADDER:

As above in renal section. 



REPRODUCTIVE:

Unremarkable. 



BONES:

No acute fracture. 



OTHER FINDINGS:

None.



IMPRESSION:

A 4.5 mm linear calculus obstructing distal right ureter proximal the 

right ureterovesical junction with only mild right hydroureter 

identified including local periureteral reaction. No hydronephrosis 

bilaterally. A similar calculus is slightly larger at the mid to 

lower pole right kidney within a calyx. No left-sided radiodense 

urolithiasis. 



Limited sigmoid diverticulosis without diverticulitis. [facility]

## 2021-10-17 NOTE — ED PROVIDER NOTE - CLINICAL SUMMARY MEDICAL DECISION MAKING FREE TEXT BOX
50 year old male with a pmh of OCD & schizophrenia biba for intox. Patient endorses drinking 2 cans of beer no other drugs on trauma no si/hi auditory or visual hallucinations. Patient requesting to leave. plan was for FS but patient eloped. Walking with steady gait.

## 2021-11-14 ENCOUNTER — INPATIENT (INPATIENT)
Facility: HOSPITAL | Age: 51
LOS: 4 days | Discharge: HOME | End: 2021-11-19
Attending: PSYCHIATRY & NEUROLOGY | Admitting: PSYCHIATRY & NEUROLOGY
Payer: MEDICAID

## 2021-11-14 VITALS
OXYGEN SATURATION: 97 % | HEIGHT: 68 IN | TEMPERATURE: 98 F | DIASTOLIC BLOOD PRESSURE: 68 MMHG | RESPIRATION RATE: 18 BRPM | SYSTOLIC BLOOD PRESSURE: 144 MMHG | HEART RATE: 82 BPM | WEIGHT: 199.96 LBS

## 2021-11-14 DIAGNOSIS — Z98.890 OTHER SPECIFIED POSTPROCEDURAL STATES: Chronic | ICD-10-CM

## 2021-11-14 DIAGNOSIS — F17.200 NICOTINE DEPENDENCE, UNSPECIFIED, UNCOMPLICATED: ICD-10-CM

## 2021-11-14 DIAGNOSIS — F14.10 COCAINE ABUSE, UNCOMPLICATED: ICD-10-CM

## 2021-11-14 DIAGNOSIS — F25.0 SCHIZOAFFECTIVE DISORDER, BIPOLAR TYPE: ICD-10-CM

## 2021-11-14 LAB
ALBUMIN SERPL ELPH-MCNC: 4 G/DL — SIGNIFICANT CHANGE UP (ref 3.5–5.2)
ALP SERPL-CCNC: 65 U/L — SIGNIFICANT CHANGE UP (ref 30–115)
ALT FLD-CCNC: 11 U/L — SIGNIFICANT CHANGE UP (ref 0–41)
ANION GAP SERPL CALC-SCNC: 14 MMOL/L — SIGNIFICANT CHANGE UP (ref 7–14)
APAP SERPL-MCNC: <5 UG/ML — LOW (ref 10–30)
AST SERPL-CCNC: 20 U/L — SIGNIFICANT CHANGE UP (ref 0–41)
BASOPHILS # BLD AUTO: 0.07 K/UL — SIGNIFICANT CHANGE UP (ref 0–0.2)
BASOPHILS NFR BLD AUTO: 0.9 % — SIGNIFICANT CHANGE UP (ref 0–1)
BILIRUB SERPL-MCNC: 0.3 MG/DL — SIGNIFICANT CHANGE UP (ref 0.2–1.2)
BUN SERPL-MCNC: 13 MG/DL — SIGNIFICANT CHANGE UP (ref 10–20)
CALCIUM SERPL-MCNC: 9.2 MG/DL — SIGNIFICANT CHANGE UP (ref 8.5–10.1)
CHLORIDE SERPL-SCNC: 105 MMOL/L — SIGNIFICANT CHANGE UP (ref 98–110)
CO2 SERPL-SCNC: 22 MMOL/L — SIGNIFICANT CHANGE UP (ref 17–32)
CREAT SERPL-MCNC: 1 MG/DL — SIGNIFICANT CHANGE UP (ref 0.7–1.5)
EOSINOPHIL # BLD AUTO: 0.34 K/UL — SIGNIFICANT CHANGE UP (ref 0–0.7)
EOSINOPHIL NFR BLD AUTO: 4.6 % — SIGNIFICANT CHANGE UP (ref 0–8)
ETHANOL SERPL-MCNC: <10 MG/DL — SIGNIFICANT CHANGE UP
GLUCOSE SERPL-MCNC: 93 MG/DL — SIGNIFICANT CHANGE UP (ref 70–99)
HCT VFR BLD CALC: 44.4 % — SIGNIFICANT CHANGE UP (ref 42–52)
HGB BLD-MCNC: 15.7 G/DL — SIGNIFICANT CHANGE UP (ref 14–18)
IMM GRANULOCYTES NFR BLD AUTO: 0.1 % — SIGNIFICANT CHANGE UP (ref 0.1–0.3)
LYMPHOCYTES # BLD AUTO: 1.92 K/UL — SIGNIFICANT CHANGE UP (ref 1.2–3.4)
LYMPHOCYTES # BLD AUTO: 26.1 % — SIGNIFICANT CHANGE UP (ref 20.5–51.1)
MCHC RBC-ENTMCNC: 32.8 PG — HIGH (ref 27–31)
MCHC RBC-ENTMCNC: 35.4 G/DL — SIGNIFICANT CHANGE UP (ref 32–37)
MCV RBC AUTO: 92.7 FL — SIGNIFICANT CHANGE UP (ref 80–94)
MONOCYTES # BLD AUTO: 0.66 K/UL — HIGH (ref 0.1–0.6)
MONOCYTES NFR BLD AUTO: 9 % — SIGNIFICANT CHANGE UP (ref 1.7–9.3)
NEUTROPHILS # BLD AUTO: 4.37 K/UL — SIGNIFICANT CHANGE UP (ref 1.4–6.5)
NEUTROPHILS NFR BLD AUTO: 59.3 % — SIGNIFICANT CHANGE UP (ref 42.2–75.2)
NRBC # BLD: 0 /100 WBCS — SIGNIFICANT CHANGE UP (ref 0–0)
PLATELET # BLD AUTO: 156 K/UL — SIGNIFICANT CHANGE UP (ref 130–400)
POTASSIUM SERPL-MCNC: 3.9 MMOL/L — SIGNIFICANT CHANGE UP (ref 3.5–5)
POTASSIUM SERPL-SCNC: 3.9 MMOL/L — SIGNIFICANT CHANGE UP (ref 3.5–5)
PROT SERPL-MCNC: 6.2 G/DL — SIGNIFICANT CHANGE UP (ref 6–8)
RBC # BLD: 4.79 M/UL — SIGNIFICANT CHANGE UP (ref 4.7–6.1)
RBC # FLD: 12.5 % — SIGNIFICANT CHANGE UP (ref 11.5–14.5)
SALICYLATES SERPL-MCNC: <0.3 MG/DL — LOW (ref 4–30)
SARS-COV-2 RNA SPEC QL NAA+PROBE: SIGNIFICANT CHANGE UP
SODIUM SERPL-SCNC: 141 MMOL/L — SIGNIFICANT CHANGE UP (ref 135–146)
WBC # BLD: 7.37 K/UL — SIGNIFICANT CHANGE UP (ref 4.8–10.8)
WBC # FLD AUTO: 7.37 K/UL — SIGNIFICANT CHANGE UP (ref 4.8–10.8)

## 2021-11-14 PROCEDURE — 93010 ELECTROCARDIOGRAM REPORT: CPT

## 2021-11-14 PROCEDURE — 99285 EMERGENCY DEPT VISIT HI MDM: CPT

## 2021-11-14 PROCEDURE — 70450 CT HEAD/BRAIN W/O DYE: CPT | Mod: 26,MA

## 2021-11-14 RX ORDER — DIPHENHYDRAMINE HCL 50 MG
50 CAPSULE ORAL ONCE
Refills: 0 | Status: DISCONTINUED | OUTPATIENT
Start: 2021-11-14 | End: 2021-11-15

## 2021-11-14 RX ORDER — HALOPERIDOL DECANOATE 100 MG/ML
5 INJECTION INTRAMUSCULAR ONCE
Refills: 0 | Status: DISCONTINUED | OUTPATIENT
Start: 2021-11-14 | End: 2021-11-15

## 2021-11-14 RX ORDER — RISPERIDONE 4 MG/1
1 TABLET ORAL AT BEDTIME
Refills: 0 | Status: DISCONTINUED | OUTPATIENT
Start: 2021-11-14 | End: 2021-11-15

## 2021-11-14 RX ADMIN — RISPERIDONE 1 MILLIGRAM(S): 4 TABLET ORAL at 22:18
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
## 2021-11-14 NOTE — ED PROVIDER NOTE - PHYSICAL EXAMINATION
CONSTITUTIONAL: Well-developed; well-nourished; in no acute distress.   SKIN: warm, dry  HEAD: Normocephalic; atraumatic.  EYES: no conjunctival injection. EOMI.   ENT: No nasal discharge; airway clear.  NECK: Supple.  CARD: S1, S2 normal; Regular rate and rhythm.   RESP: No wheezes, rales or rhonchi.  ABD: soft ntnd.   EXT: Normal ROM.  No lower extremity edema.   LYMPH: No acute cervical adenopathy.  NEURO: Alert, oriented, grossly unremarkable. No FND.   PSYCH: Cooperative, appropriate.

## 2021-11-14 NOTE — ED BEHAVIORAL HEALTH ASSESSMENT NOTE - CURRENT MEDICATION
denies taking any medication in past 2 months but endorses injection 2 months ago, unsure of medication

## 2021-11-14 NOTE — ED PROVIDER NOTE - CLINICAL SUMMARY MEDICAL DECISION MAKING FREE TEXT BOX
pt admitted to inpatient psychiatry at [facility]

## 2021-11-14 NOTE — ED PROVIDER NOTE - CARE PLAN
Assessment and plan of treatment:	Plan: Labs, psych consult, reassess.   1 Principal Discharge DX:	Paranoia  Assessment and plan of treatment:	Plan: Labs, psych consult, reassess.  Secondary Diagnosis:	Auditory hallucination  Secondary Diagnosis:	Schizoaffective disorder  Secondary Diagnosis:	Bipolar disorder
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
## 2021-11-14 NOTE — ED BEHAVIORAL HEALTH ASSESSMENT NOTE - REASON
will admit to IPP on 9.39 legals pending COVID swab and bed availability will admit to IPP on 9.27 legals pending bed availability

## 2021-11-14 NOTE — ED ADULT NURSE NOTE - NS ED NURSE REPORT GIVEN TO FT
Pt with bed sign to IPP [facility] Site report is given to RN waiting for transport

## 2021-11-14 NOTE — ED BEHAVIORAL HEALTH ASSESSMENT NOTE - VIOLENCE RISK FACTORS:
Antisocial behavior/cognition (past or present)/Substance abuse/Command hallucinations for violent behavior/Noncompliance with treatment

## 2021-11-14 NOTE — ED PROVIDER NOTE - PROGRESS NOTE DETAILS
ED Attending LOUIE Aguirre  Pending labs[facility] pt aware, will reassess. Authored by Martha Dumont DO: Pt refusing EKG/COVID swab at this time. Consult placed for telepsych. Authored by Martha Dumont DO: Spoke with telepsychiatry, plan for psych re-eval in AM. ED Attending LOUIE Aguirre  pt endorsed to Dr. AMPARO Zhu   Please follow up psych, reassess. mckenna: received from Dr Aguirre. pending psych recs and reassessment seen by psych and plan for IPP. ekg with reassuring intervals. prn meds in psych note. pt calm here. CTH done per psych request and reassuring. plan for bed placement. on 1:1 ED Attending LOUIE Aguirre  Pt endorsed to me from. Dr. Marko Castellano. admitted to psych, pending bed, calm at this time, on 1:1, will continue to monitor and reassess. ariannek: received signout from Dr Aguirre. pending IPP. ariannek: signed out to Dr Langford pending IPP. pt was agitate with psych team and received haldol/ativan IM after refused PO. did receive po benadryl. ccruz - pt signed out to me by Dr Zhu. pending IPP bed placement, [facility].

## 2021-11-14 NOTE — ED PROVIDER NOTE - OBJECTIVE STATEMENT
50 y/o M PMHx schizoaffective disorder, bipolar, OCD, PTSD presents to ED requesting psych eval. Pt reports he stopped taking his psych meds for 1 month because he felt that they were not helping him. Reports increasing paranoia, denies SI or HI. No AV hallucinations. No substance abuse or misuse reporting. Pt requesting to see psychiatrist.

## 2021-11-14 NOTE — ED PROVIDER NOTE - ATTENDING CONTRIBUTION TO CARE
50 y/o m w/ pmhx of schizoaffective d/o bipolar type, OCD, PTSD,  incarceration in [place] (released April 2019), h/o cutting behavior, 50 y/o m w/ pmhx of schizoaffective d/o bipolar type, OCD, PTSD,  incarceration in [place] (released April 2019), h/o cutting behavior, presents requesting psych evaluation stating his medications do not work and he feels more irritable from this. No fever, chills, n/v, cp,  pleuritic cp, sob, palpitations, diaphoresis, cough, ha/lh/dizziness, numbness/tingling, neck pain/ stiffness, abd pain, diarrhea, constipation, melena/brbpr, urinary symptoms, vtrauma, weakness, edema, calf pain/swelling/erythema, sick contacts, recent travel or rash. No SI/HI. No v/a hallucinations. no IVDA. pt adamant about speaing to psychiatrist regarding his meds.     Vital Signs: I have reviewed the initial vital signs. Constitutional:  pt sitting on stretcher speaking full sentences in nad. Integumentary: No rash. No flushing, dryness or diaphoresis. ENT: MMM. No tongue fasciculations. NECK: Supple, non-tender, no meningeal signs. Cardiovascular: RRR, radial pulses 2/4 b/l. No JVD. Respiratory: BS present b/l, ctabl, no wheezing or crackles, no accessory muscle use, no stridor. Gastrointestinal: BS present throughout all 4 quadrants, soft, nd, nt, no rebound tenderness or guarding, no cvat. Musculoskeletal: FROM, no edema, no calf pain/swelling/erythema. No tremors. Neurologic: AAOx3, motor 5/5 and sensation intact throughout upper and lower ext, CN II-XII intact, No facial droop. No focal deficits. No clonus or rigidity. No hypo or hyperreflexia. 52 y/o m w/ pmhx of schizoaffective d/o bipolar type, OCD, PTSD,  incarceration in [place] (released April 2019), h/o cutting behavior, presents requesting psych evaluation stating his medications do not work so stopped them for a month and he feels more irritable from this. No fever, chills, n/v, cp,  pleuritic cp, sob, palpitations, diaphoresis, cough, ha/lh/dizziness, numbness/tingling, neck pain/ stiffness, abd pain, diarrhea, constipation, melena/brbpr, urinary symptoms, trauma, weakness, edema, calf pain/swelling/erythema, sick contacts, recent travel or rash. No SI/HI. No v/a hallucinations. no IVDA. pt adamant about speaking to psychiatrist regarding his meds.     Vital Signs: I have reviewed the initial vital signs. Constitutional: Pt sitting on stretcher speaking full sentences in nad. Integumentary: No rash. No flushing, dryness or diaphoresis. ENT: MMM. No tongue fasciculations. NECK: Supple, non-tender, no meningeal signs. Cardiovascular: RRR, radial pulses 2/4 b/l. No JVD. Respiratory: BS present b/l, ctabl, no wheezing or crackles, no accessory muscle use, no stridor. Gastrointestinal: BS present throughout all 4 quadrants, soft, nd, nt, no rebound tenderness or guarding, no cvat. Musculoskeletal: FROM, no edema, no calf pain/swelling/erythema. No tremors. Neurologic: AAOx3, motor 5/5 and sensation intact throughout upper and lower ext, CN II-XII intact, No facial droop. No focal deficits. No clonus or rigidity. No hypo or hyperreflexia.

## 2021-11-14 NOTE — ED BEHAVIORAL HEALTH ASSESSMENT NOTE - RISK ASSESSMENT
Patient is at elevated risk given command auditory hallucinations, persecutory delusions, poor adherence to treatment, not fully mitigated by his access to care Moderate Acute Suicide Risk

## 2021-11-14 NOTE — ED BEHAVIORAL HEALTH ASSESSMENT NOTE - HPI (INCLUDE ILLNESS QUALITY, SEVERITY, DURATION, TIMING, CONTEXT, MODIFYING FACTORS, ASSOCIATED SIGNS AND SYMPTOMS)
VIRGILIO DAN is a 51y old white Male, single with no children, unemployed on SSI, living with his sister, with psychiatric history of reported schizoaffective disorder, bipolar type, cocaine use disorder, several prior IPP admissions, last in 2019 at [facility], presented for worsening paranoia. Psychiatry consulted for mental health evaluation.    Upon approach, patient was sleeping in chair with blanket covering his head. Patient guarded but cooperative with interview, making minimal eye contact, with soft speech. He reports that he stopped taking medication 2 months ago as he felt it was not helping with his feelings of paranoia and that they "don't take away the voices". He reports that he gets an injection but is unsure of the name of the medication or able to provide many details. Reports that he thinks he was taking seroquel "100 or 200 in the evening" but does not feel it was helpful. He reports that he stopped seeing psychiatrist but that it was at a clinic on [facility] near the [facility], however unable to provide name of psychiatrist. Reports that he hears "voices talking in my brain", that they say "all bad things" like "kill this one", and are "laughing, always laughing". Describes reported paranoia as "I think people wanna kill me", adding that it is "getting really really hard to deal with it". Reports feeling ambivalent about taking medication as he states that medication had been unhelpful with voices and paranoid feelings in the past and that they also make him feel "like a zombie". Reports that he is "unsure" of what would happen if he left the hospital but that the "paranoia has been very difficult".  Reports that he does use crack cocaine "once in a while", with last use reported "a couple of days ago". Reports intermittent alcohol use but denies recent use. He denies any other recent substance use.  Patient reports that he "does not wanna be around anymore" because of symptoms but denies any active suicidal ideation. Denies any manic symptoms at this time.  Patient states that he does not know his sister's phone number by heart and would prefer that she not be contacted at this time.

## 2021-11-14 NOTE — ED BEHAVIORAL HEALTH ASSESSMENT NOTE - SUMMARY
VIRGILIO DAN is a 51y old white Male, single with no children, unemployed on SSI, living with his sister, with psychiatric history of reported schizoaffective disorder, bipolar type, cocaine use disorder, several prior IPP admissions, last in 2019 at [facility], presented for worsening paranoia. Psychiatry consulted for mental health evaluation.    On evaluation, patient is presenting with worsening command auditory hallucinations and persecutory delusions in the context of medication non-adherence. While patient's chart is notable for prior history of suspected seeking secondary gains, his current presentation appears to warrant inpatient psychiatric hospitalization for safety, stabilization, and medication optimization. Will initiate risperidone to target patient's psychotic symptoms, given that inpatient psychiatric unit currently is pending any bed availability.       Recommendations:  -Admit to IPP on 9.39 legals pending bed availability (currently no beds available) and COVID swab  -1:1 until patient arrives in IPP  -start risperidone 1mg po qhs  -nicotine replacement therapy as patient is a smoker  -For severe agitation not responding to behavioral intervention, may give haldol 5 mg po q6h prn, ativan 2 mg po q6h prn, benadryl 50 mg po q6h prn, with escalation to IM if patient refusing PO and remains an imminent danger to self or others. If IM antipsychotic is administered, please perform follow-up ECG for QTc monitoring.  -please obtain urine toxicology, EKG, and CT head without contrast  -psychiatry team will continue to follow patient VIRGILIO DAN is a 51y old white Male, single with no children, unemployed on SSI, living with his sister, with psychiatric history of reported schizoaffective disorder, bipolar type, cocaine use disorder, several prior IPP admissions, last in 2019 at [facility], presented for worsening paranoia. Psychiatry consulted for mental health evaluation.    On evaluation, patient is presenting with worsening command auditory hallucinations and persecutory delusions in the context of medication non-adherence. While patient's chart is notable for prior history of suspected seeking secondary gains, his current presentation appears to warrant inpatient psychiatric hospitalization for safety, stabilization, and medication optimization. Will initiate risperidone to target patient's psychotic symptoms, given that inpatient psychiatric unit currently is pending any bed availability.       Recommendations:  -Admit to IPP on 9.27 legals pending bed availability and COVID swab  -1:1 until patient arrives in IPP  -start risperidone 1mg po qhs  -nicotine replacement therapy as patient is a smoker  -For severe agitation not responding to behavioral intervention, may give haldol 5 mg po q6h prn, ativan 2 mg po q6h prn, benadryl 50 mg po q6h prn, with escalation to IM if patient refusing PO and remains an imminent danger to self or others. If IM antipsychotic is administered, please perform follow-up ECG for QTc monitoring.  -please obtain urine toxicology, EKG, and CT head without contrast  -psychiatry team will continue to follow patient

## 2021-11-14 NOTE — ED ADULT NURSE REASSESSMENT NOTE - NS ED NURSE REASSESS COMMENT FT1
pt assessed, pt sleeping, pt appears comfortable, pt waiting IPP admission, 1:1 continued, safety and comfort maintained, will continue to monitor.

## 2021-11-14 NOTE — ED ADULT TRIAGE NOTE - CHIEF COMPLAINT QUOTE
pt sts "I feel very paranoid and I havent taken my medications in a month and Id like to talk to someone"  denies SI/HI

## 2021-11-14 NOTE — ED ADULT NURSE NOTE - OBJECTIVE STATEMENT
Pt presents to ED c/o feeling paranoid. Pt has hx of schizophrenia; states has not taken medication x 1 month. Pt denies homicidal or suicidal thoughts. Pt is awake and alert.
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
## 2021-11-14 NOTE — ED PROVIDER NOTE - NS ED ROS FT
Review of Systems:  CONSTITUTIONAL: No fever, No diaphoresis   SKIN: No rash  HEMATOLOGIC: No abnormal bleeding   EYES: No eye pain, No blurred vision  ENT: No sore throat, No neck pain, No rhinorrhea  RESPIRATORY: No shortness of breath, No cough  CARDIAC: No chest pain, No palpitations  GI: No abdominal pain, No nausea, No vomiting, No diarrhea  : No dysuria, frequency, hematuria.   MUSCULOSKELETAL: No joint paint, No swelling, No back pain  NEUROLOGIC: No numbness, No focal weakness, No headache, No dizziness  PSYCH: Patient denies SI/HI, denies any self-harm attempt or OD, denies any other substance abuse/misuse, and denies AV hallucinations.   All other systems negative, unless specified in HPI

## 2021-11-14 NOTE — ED BEHAVIORAL HEALTH ASSESSMENT NOTE - OTHER PAST PSYCHIATRIC HISTORY (INCLUDE DETAILS REGARDING ONSET, COURSE OF ILLNESS, INPATIENT/OUTPATIENT TREATMENT)
Pt was discharged from [facility] on 8/9/19 on lexapro 10mg daily.  Reports he previously followed with a psychiatrist for "bipolar and schizophrenia", unsure of name or address

## 2021-11-15 DIAGNOSIS — Z98.890 OTHER SPECIFIED POSTPROCEDURAL STATES: Chronic | ICD-10-CM

## 2021-11-15 PROCEDURE — 99233 SBSQ HOSP IP/OBS HIGH 50: CPT | Mod: GC

## 2021-11-15 RX ORDER — HALOPERIDOL DECANOATE 100 MG/ML
5 INJECTION INTRAMUSCULAR ONCE
Refills: 0 | Status: DISCONTINUED | OUTPATIENT
Start: 2021-11-15 | End: 2021-11-19

## 2021-11-15 RX ORDER — TRAZODONE HCL 50 MG
50 TABLET ORAL ONCE
Refills: 0 | Status: COMPLETED | OUTPATIENT
Start: 2021-11-15 | End: 2021-11-15

## 2021-11-15 RX ORDER — RISPERIDONE 4 MG/1
1 TABLET ORAL
Refills: 0 | Status: DISCONTINUED | OUTPATIENT
Start: 2021-11-15 | End: 2021-11-19

## 2021-11-15 RX ORDER — DIPHENHYDRAMINE HCL 50 MG
50 CAPSULE ORAL ONCE
Refills: 0 | Status: COMPLETED | OUTPATIENT
Start: 2021-11-15 | End: 2021-11-15

## 2021-11-15 RX ORDER — HALOPERIDOL DECANOATE 100 MG/ML
5 INJECTION INTRAMUSCULAR EVERY 6 HOURS
Refills: 0 | Status: DISCONTINUED | OUTPATIENT
Start: 2021-11-15 | End: 2021-11-19

## 2021-11-15 RX ORDER — DIPHENHYDRAMINE HCL 50 MG
50 CAPSULE ORAL ONCE
Refills: 0 | Status: DISCONTINUED | OUTPATIENT
Start: 2021-11-15 | End: 2021-11-19

## 2021-11-15 RX ORDER — HYDROXYZINE HCL 10 MG
50 TABLET ORAL EVERY 6 HOURS
Refills: 0 | Status: DISCONTINUED | OUTPATIENT
Start: 2021-11-15 | End: 2021-11-19

## 2021-11-15 RX ADMIN — RISPERIDONE 1 MILLIGRAM(S): 4 TABLET ORAL at 21:30

## 2021-11-15 RX ADMIN — Medication 50 MILLIGRAM(S): at 12:54

## 2021-11-15 NOTE — PROGRESS NOTE BEHAVIORAL HEALTH - SUMMARY
Plan  - Increase to Risperidone 1 mg BID  - For severe agitation not responding to behavioral intervention, may give haldol 5 mg po q6h prn, ativan 2 mg po q6h prn, hydroxyzine 50 mg po q6h prn, with escalation to IM if patient refusing PO and remains an imminent danger to self or others. If IM antipsychotic is administered, please perform follow-up ECG for QTc monitoring.  - VIRGILIO DAN is a 51y old white Male, single with no children, unemployed on SSI, living with his sister, with psychiatric history of reported schizoaffective disorder, bipolar type, cocaine use disorder, several prior IPP admissions, last in 2019 at [facility], presented for worsening paranoia. Psychiatry consulted for mental health evaluation.    Recommendations:  - Increase to Risperidone 1 mg BID  - For severe agitation not responding to behavioral intervention, may give haldol 5 mg po q6h prn, ativan 2 mg po q6h prn, hydroxyzine 50 mg po q6h prn, with escalation to IM if patient refusing PO and remains an imminent danger to self or others. If IM antipsychotic is administered, please perform follow-up ECG for QTc monitoring.

## 2021-11-15 NOTE — PATIENT PROFILE BEHAVIORAL HEALTH - NSBHABLCOPE_PSY_A_CORE
Referral done In [facility] for [facility] urology Dr Cristiana Al moderate

## 2021-11-15 NOTE — H&P ADULT - NSHPLABSRESULTS_GEN_ALL_CORE
LABS:  cret                        15.7   7.37  )-----------( 156      ( 14 Nov 2021 04:44 )             44.4     11-14    141  |  105  |  13  ----------------------------<  93  3.9   |  22  |  1.0    Ca    9.2      14 Nov 2021 04:44    TPro  6.2  /  Alb  4.0  /  TBili  0.3  /  DBili  x   /  AST  20  /  ALT  11  /  AlkPhos  65  11-14

## 2021-11-15 NOTE — H&P ADULT - HISTORY OF PRESENT ILLNESS
52 y/o M PMHx schizoaffective disorder, bipolar, OCD, PTSD presents to ED requesting psych eval. Pt reports he stopped taking his seroquel for 1 month because he felt that they were not helping him. Reports increasing paranoia, denies SI or HI. No AV hallucinations. No substance abuse or misuse reporting. Pt requesting to see psychiatrist. Denies any CP, SOB, N/V/D, HA,

## 2021-11-15 NOTE — PROGRESS NOTE BEHAVIORAL HEALTH - NSBHFUPINTERVALHXFT_PSY_A_CORE
Patient is a 52 y/o male, with past psych hx of schizoaffective disorder bipolar type, who presents with command AH and persecutory delusions in the context of medication non-adherence. Patient admitted to inpatient psychiatry on 9.27 and transfer is pending bed availability.     Today, patient states he has no complaints and would like to go home and will restart taking his medications. Denies SI, HI, and AVH. States has been sleeping okay. Denies medication side-effects. Patient is a 52 y/o male, with past psych hx of schizoaffective disorder bipolar type, who presents with command AH and persecutory delusions in the context of medication non-adherence. Patient admitted to inpatient psychiatry on 9.27 and transfer is pending bed availability.     Today, patient states he has no complaints but continues to minimise his symtpoms and would like to go home and will restart taking his medications. Denies SI, HI, and AVH. States has been sleeping okay. Denies medication side-effects.  According to staff, patient is very irritable and agitated and needs multiple efforts to re-direct him and help him calm down.

## 2021-11-15 NOTE — PATIENT PROFILE BEHAVIORAL HEALTH - NSDASASENSITIVE_PSY_ALL_CORE
Telephone Encounter by Melissa Kaba NCMA at 06/16/17 08:30 AM     Author:  Melissa Kaba NCMA Service:  (none) Author Type:  Certified Medical Assistant     Filed:  06/16/17 08:31 AM Encounter Date:  6/16/2017 Status:  Signed     :  Melissa Kaba NCMA (Certified Medical Assistant)            Prescription has been electronically faxed to your pharmacy.    Refilled per medication protocol.[TR1.1T]        Revision History        User Key Date/Time User Provider Type Action    > TR1.1 06/16/17 08:31 AM Melissa Kaba NCMA Certified Medical Assistant Sign    T - Template            
No

## 2021-11-15 NOTE — H&P ADULT - ASSESSMENT
52 y/o M PMHx schizoaffective disorder, bipolar, OCD, PTSD presents to ED requesting psych eval for his seroquel he has not been taking but would like to go back on. Admitted for increased paranoia.      # Increased paranoia  - Will follow recommendations as per psychiatrist     # Smoking cessation  - pt declined nicotine patch/gum when offered     # Regular diet 52 y/o M PMHx schizoaffective disorder, bipolar, OCD, PTSD presents to ED requesting psych eval for his seroquel he has not been taking but would like to go back on. Admitted for increased paranoia.      # Increased paranoia  - Will follow recommendations as per psychiatrist     # Smoking cessation  - nicotine patch    # Regular diet

## 2021-11-15 NOTE — H&P ADULT - NSHPPHYSICALEXAM_GEN_ALL_CORE
VITALS:   T(C): 35.9 (11-15-21 @ 20:30), Max: 36.6 (11-15-21 @ 03:03)  HR: 83 (11-15-21 @ 20:30) (58 - 83)  BP: 109/76 (11-15-21 @ 20:30) (86/53 - 109/76)  RR: 16 (11-15-21 @ 20:30) (16 - 18)  SpO2: 100% (11-15-21 @ 18:02) (97% - 100%)    GENERAL: NAD, lying in bed comfortably  HEAD:  Atraumatic, Normocephalic  EYES: EOMI, conjunctiva and sclera clear  ENT: Moist mucous membranes  NECK: Supple, No JVD  CHEST/LUNG: CTA b/l, No rales, rhonchi, wheezing, or rubs. Unlabored respirations  HEART: Regular rate and rhythm; No murmurs, rubs, or gallops  ABDOMEN: Bowel sounds present; Soft, Nontender, Nondistended.   EXTREMITIES:  No clubbing, cyanosis, or edema  NERVOUS SYSTEM:  Alert & Oriented X3, speech clear. No deficits   MSK: FROM all 4 extremities, full and equal strength  SKIN: No rashes or lesions

## 2021-11-15 NOTE — H&P ADULT - NSICDXFAMILYHX_GEN_ALL_CORE_FT
FAMILY HISTORY:  Father  Still living? No  Family history of diabetes mellitus (DM), Age at diagnosis: Age Unknown    Mother  Still living? Unknown  Family history of diabetes mellitus (DM), Age at diagnosis: Age Unknown

## 2021-11-16 PROCEDURE — 99232 SBSQ HOSP IP/OBS MODERATE 35: CPT

## 2021-11-16 RX ORDER — NICOTINE POLACRILEX 2 MG
1 GUM BUCCAL DAILY
Refills: 0 | Status: DISCONTINUED | OUTPATIENT
Start: 2021-11-16 | End: 2021-11-19

## 2021-11-16 RX ADMIN — RISPERIDONE 1 MILLIGRAM(S): 4 TABLET ORAL at 08:10

## 2021-11-16 RX ADMIN — RISPERIDONE 1 MILLIGRAM(S): 4 TABLET ORAL at 20:01

## 2021-11-16 NOTE — PROGRESS NOTE BEHAVIORAL HEALTH - NSBHADDHXSUBSTFT_PSY_A_CORE
Cigarettes, 1ppd for decades  Alcohol- reports intermittent use, denying recent use  Cocaine/Crack - reports intermittent use, last smoked crack a few days ago

## 2021-11-16 NOTE — PROGRESS NOTE BEHAVIORAL HEALTH - NSBHFUPINTERVALHXFT_PSY_A_CORE
VIRGILIO DAN is a 51y old white Male, single with no children, unemployed on SSI, living with his sister, with psychiatric history of reported schizoaffective disorder, bipolar type, cocaine use disorder, several prior IPP admissions, last in 2019 at [facility], presented to [facility] for worsening paranoia. Patient came to [facility] ED on 11/14.     No acute events overnight. No PRNs. On approach, patient states  THIS IS AN INCOMPLETE NOTE . FULL NOTE TO FOLLOW SHORTLY VIRGILIO DAN is a 51y old white Male, single with no children, unemployed on SSI, living with his sister, with psychiatric history of reported schizoaffective disorder, bipolar type, cocaine use disorder, several prior IPP admissions, last in 2019 at [facility], presented to [facility] for worsening paranoia. Patient came to [facility] ED on 11/14, was started on risperdal.      No acute events overnight. No PRNs. On approach, patient states    THIS IS AN INCOMPLETE NOTE . FULL NOTE TO FOLLOW SHORTLY VIRGILIO DAN is a 51y old white Male, single with no children, unemployed on SSI, living with his sister, with psychiatric history of reported schizoaffective disorder, bipolar type, cocaine use disorder, several prior IPP admissions, last in 2019 at [facility], presented to [facility] for worsening paranoia. Patient came to [facility] ED on 11/14, was started on risperdal.      No acute events overnight. No PRNs. On approach, patient states that he would like to be discharged. Patient reports that he brought himself into hospital because he stopped taking his seroquel and started hearing voices which he reports were saying disparaging things about him. On interview today, patient denies that these voices told him to hurt himself or others; however, on admission, chart review indicates that these voices told him to hurt someone. Patient reports that he stopped taking his seroquel since he thought that he wasn't benefitting from taking the medications, but reports that he benefited from taking the medication. Patient was started on risperdal upon hospitalization, reports that the risperdal has been helping him. Patient reports that the last time he heard voices was 2 days ago, states that the voices are sometimes the voices of family members. Patient did not elaborate on what specifically the voices were telling him.     Patient reports that he has had a psychiatric history, "basically all my life." Patient reports that he was previously hospitalized 3 years ago at [facility] for "depression and things like that." Patient reports that he does not have an outpatient psychiatrist or therapist, but then added that he has seen a Dr. Plunkett at [facility] in the past. Reports that he does not know his PCP's name - states that he hasn't gone to primary care doctor in a long time. Patient reports that he was previously diagnosed with bipolar disorder, PTSD, and schizophrenia.     Patient reports occasional EtOH use, but none recently. Patient denies the use of any recent substances, including cocaine; however, as per chart review, patient on admission reported use of crack cocaine 2 days prior to admission.     Patient reports that he has never had a legal history, not even a parking ticket; however, per chart review, patient was extensive legal history (incarcerated up for five years for bank robbery in [place], got out in April 2019, per web search and [place]  have made several arrests for shoplifting and disorderly conduct, state last was October 2018, arrest in July 11, 2017 for violating parole with a stolen car, 2015 broke his hip getting chased with stolen car, 2014 also arrested while getting chased in a stolen car).     Patient reports that he has been living with his sister, Lisette, but doesn't remember her phone number. Patient did not come with a cell phone. Was able to obtain a prior number of Lisette based on chart review, [phone]. Attempted to call number, but appears to be a wrong number now.     Pharmacy - [facility]. VIRGILIO DAN is a 51y old white Male, single with no children, unemployed on SSI, living with his sister, with psychiatric history of reported schizoaffective disorder, bipolar type, cocaine use disorder, several prior IPP admissions, last in 2019 at [facility], presented to [facility] for worsening paranoia. Patient came to [facility] ED on 11/14, was started on risperdal.      No acute events overnight. No PRNs. On approach, patient states that he would like to be discharged. Patient reports that he brought himself into hospital because he stopped taking his seroquel and started hearing voices which he reports were saying disparaging things about him. On interview today, patient denies that these voices told him to hurt himself or others; however, on admission, chart review indicates that these voices told him to hurt someone. Patient reports that he stopped taking his seroquel since he thought that he wasn't benefitting from taking the medications, but reports that he benefited from taking the medication. Patient was started on risperdal upon hospitalization, reports that the risperdal has been helping him. Patient reports that the last time he heard voices was 2 days ago, states that the voices are sometimes the voices of family members. Patient did not elaborate on what specifically the voices were telling him.     Patient reports that he has had a psychiatric history, "basically all my life." Patient reports that he was previously hospitalized 3 years ago at [facility] for "depression and things like that." Patient reports that he does not have an outpatient psychiatrist or therapist, but then added that he has seen a Dr. Plunkett at [facility] in the past. Reports that he does not know his PCP's name - states that he hasn't gone to primary care doctor in a long time. Patient reports that he was previously diagnosed with bipolar disorder, PTSD, and schizophrenia.     Patient reports occasional EtOH use, but none recently. Patient denies the use of any recent substances, including cocaine; however, as per chart review, patient on admission reported use of crack cocaine 2 days prior to admission.     Patient reports that he has never had a legal history, not even a parking ticket; however, per chart review, patient was extensive legal history (incarcerated up for five years for bank robbery in [place], got out in April 2019, per web search and [place]  have made several arrests for shoplifting and disorderly conduct, state last was October 2018, arrest in July 11, 2017 for violating parole with a stolen car, 2015 broke his hip getting chased with stolen car, 2014 also arrested while getting chased in a stolen car).     Patient reports that he has been living with his sister, Lisette, but doesn't remember her phone number. Patient did not come with a cell phone. Was able to obtain a prior number of Lisette based on chart review, [phone]. Attempted to call number, but appears to be a wrong number now. Called [phone], which was listed as patient's home phone number, but line appears to be disconnected.     Pharmacy - [facility] VIRGILIO DAN is a 51y old white Male, single with no children, unemployed on SSI, living with his sister, with psychiatric history of reported schizoaffective disorder, bipolar type, cocaine use disorder, several prior IPP admissions, last in 2019 at [facility], presented to [facility] for worsening paranoia. Patient came to [facility] ED on 11/14, was started on risperdal.      No acute events overnight. No PRNs. On approach, patient states that he would like to be discharged. Patient reports that he brought himself into hospital because he stopped taking his seroquel and started hearing voices which he reports were saying disparaging things about him. On interview today, patient denies that these voices told him to hurt himself or others; however, on admission, chart review indicates that these voices told him to hurt someone. Patient reports that he stopped taking his seroquel since he thought that he wasn't benefitting from taking the medications, but reports that he benefited from taking the medication. Patient was started on risperdal upon hospitalization, reports that the risperdal has been helping him. Patient reports that the last time he heard voices was 2 days ago, states that the voices are sometimes the voices of family members. Patient did not elaborate on what specifically the voices were telling him.     Patient reports that he has had a psychiatric history, "basically all my life." Patient reports that he was previously hospitalized 3 years ago at [facility] for "depression and things like that." Patient reports that he does not have an outpatient psychiatrist or therapist, but then added that he has seen a Dr. Plunkett at [facility] in the past. Reports that he does not know his PCP's name - states that he hasn't gone to primary care doctor in a long time. Patient reports that he was previously diagnosed with bipolar disorder, PTSD, and schizophrenia.     Patient reports occasional EtOH use, but none recently. Patient denies the use of any recent substances, including cocaine; however, as per chart review, patient on admission reported use of crack cocaine 2 days prior to admission.     Patient reports that he has never had a legal history, not even a parking ticket; however, per chart review, patient was extensive legal history (incarcerated up for five years for bank robbery in [place], got out in April 2019, per web search and [place]  have made several arrests for shoplifting and disorderly conduct, state last was October 2018, arrest in July 11, 2017 for violating parole with a stolen car, 2015 broke his hip getting chased with stolen car, 2014 also arrested while getting chased in a stolen car).     Patient reports that he has been living with his sister, Lisette, but doesn't remember her phone number. Patient did not come with a cell phone. Was able to obtain a prior number of Lisette based on chart review, [phone]. Attempted to call number, but appears to be a wrong number now. Called [phone], which was listed as patient's home phone number, but line appears to be disconnected.     Patient reports that pharmacy is [facility] ([phone]). Called for med rec; as per pharmacy, patient has no past medications at this pharmacy as per their system (at least the past 2 years). As per pharmacy, patient's address on file was in [place].

## 2021-11-16 NOTE — PROGRESS NOTE BEHAVIORAL HEALTH - SUMMARY
VIRGILIO DAN is a 51y old white Male, single with no children, unemployed on SSI, living with his sister, with psychiatric history of reported schizoaffective disorder, bipolar type, cocaine use disorder, several prior IPP admissions, last in 2019 at [facility], presented for worsening paranoia. Psychiatry consulted for mental health evaluation.    On evaluation, patient is presenting with worsening command auditory hallucinations and persecutory delusions in the context of medication non-adherence. While patient's chart is notable for prior history of suspected seeking secondary gains, his current presentation appears to warrant inpatient psychiatric hospitalization for safety, stabilization, and medication optimization. Initiated risperidone to target patient's psychotic symptoms. Admitted to [facility] on 9.27 legals.     Plan:  # Admission Labs & paperwork   - [x] CBC, CMP, TSH, lipid panel, a1c ordered (for AM day after admission)   - [x] COVID swab routine (ordered Thursday this week)  - [ ] C-SSRS, CGI, AIMS completed    # VIRGILIO DAN is a 51y old white Male, single with no children, unemployed on SSI, living with his sister, with psychiatric history of reported schizoaffective disorder, bipolar type, cocaine use disorder, several prior [facility] admissions, last in 2019 at [facility], presented for worsening paranoia. Psychiatry consulted for mental health evaluation.    On evaluation, patient is presenting with worsening command auditory hallucinations and persecutory delusions in the context of medication non-adherence. While patient's chart is notable for prior history of suspected seeking secondary gains, his current presentation appears to warrant inpatient psychiatric hospitalization for safety, stabilization, and medication optimization. Initiated risperidone to target patient's psychotic symptoms. Admitted to [facility] on 9.27 legals.     Plan:  # Admission Labs & paperwork   - [x] CBC, CMP, TSH, lipid panel, a1c ordered (for AM day after admission)   - [x] COVID swab routine (ordered Thursday this week)  - [ ] C-SSRS, CGI, AIMS completed    #psychosis, r/o bipolar disorder  - risperdone 1 mg bid    #Nicotine withdrawal  - nicotine -   7 mG/24Hr(s) Patch 1 patch Transdermal daily    #PRN medications  - diphenhydramine 50 mg PRN Rash and/or Itching  - haloperidol 5 mg q6h PRN agitation  - hydroxyzine hydrochloride 50 mg q6h PRN anxiety/insomnia  - lorazepam 2 mg PRN Agitation VIRGILIO DAN is a 51y old white Male, single with no children, unemployed on SSI, living with his sister, with psychiatric history of reported schizoaffective disorder, bipolar type, cocaine use disorder, several prior [facility] admissions, last in 2019 at [facility], presented for worsening paranoia. Psychiatry consulted for mental health evaluation.    On evaluation, patient is presenting with worsening command auditory hallucinations and persecutory delusions in the context of medication non-adherence. While patient's chart is notable for prior history of suspected seeking secondary gains, his current presentation appears to warrant inpatient psychiatric hospitalization for safety, stabilization, and medication optimization. Initiated risperidone to target patient's psychotic symptoms. Admitted to [facility] on 9.27 legals.     Plan:  # Admission Labs & paperwork   - [x] CBC, CMP, TSH, lipid panel, a1c ordered (for AM day after admission)   - [x] COVID swab routine (ordered Thursday this week)  - [x] C-SSRS, CGI, AIMS completed    #psychosis, r/o bipolar disorder  - risperdone 1 mg bid    #Nicotine withdrawal  - nicotine -   7 mG/24Hr(s) Patch 1 patch Transdermal daily    #PRN medications  - diphenhydramine 50 mg PRN Rash and/or Itching  - haloperidol 5 mg q6h PRN agitation  - hydroxyzine hydrochloride 50 mg q6h PRN anxiety/insomnia  - lorazepam 2 mg PRN Agitation VIRGILIO DAN is a 51y old white Male, single with no children, unemployed on SSI, living with his sister, with psychiatric history of reported schizoaffective disorder, bipolar type, cocaine use disorder, several prior [facility] admissions, last in 2019 at [facility], presented for worsening paranoia. Psychiatry consulted for mental health evaluation.    On evaluation, patient is presenting with worsening command auditory hallucinations and persecutory delusions in the context of medication non-adherence. While patient's chart is notable for prior history of suspected seeking secondary gains, his current presentation appears to warrant inpatient psychiatric hospitalization for safety, stabilization, and medication optimization. Initiated risperidone to target patient's psychotic symptoms. Admitted to [facility] on 9.27 legals.     Plan:  # Admission Labs & paperwork   - [x] CBC, CMP, TSH, lipid panel, a1c ordered (for AM day after admission)   - [x] COVID swab routine (ordered Thursday this week)  - [x] C-SSRS, CGI, AIMS completed    #bipolar disorder with psychosis  - risperdone 1 mg bid    #Nicotine dependence  - nicotine - 7 mG/24Hr(s) Patch 1 patch Transdermal daily    #Cocaine use disorder  - no active signs of withdrawal    #PRN medications  - diphenhydramine 50 mg PRN Rash and/or Itching  - haloperidol 5 mg q6h PRN agitation  - hydroxyzine hydrochloride 50 mg q6h PRN anxiety/insomnia  - lorazepam 2 mg PRN Agitation VIRGILIO DAN is a 51y old white Male, single with no children, unemployed on SSI, living with his sister, with psychiatric history of reported schizoaffective disorder, bipolar type, cocaine use disorder, several prior [facility] admissions, last in 2019 at [facility], presented for worsening paranoia. Psychiatry consulted for mental health evaluation.    On evaluation, patient is presenting with worsening command auditory hallucinations and persecutory delusions in the context of medication non-adherence. While patient's chart is notable for prior history of suspected seeking secondary gains, his current presentation appears to warrant inpatient psychiatric hospitalization for safety, stabilization, and medication optimization. Initiated risperidone to target patient's psychotic symptoms. Admitted to [facility] on 9.27 legals.     On assessment today, patient is very discharge-oriented, and is denying acute psychotic symptoms and denies thoughts of wanting to hurt self or others. Patient reports that risperdal has been helping with the voices.     Plan:  # Admission Labs & paperwork   - [x] CBC, CMP, TSH, lipid panel, a1c ordered (for AM day after admission)   - [x] COVID swab routine (ordered Thursday this week)  - [x] C-SSRS, CGI, AIMS completed    #bipolar disorder with psychosis. c/w the following:  - risperdone 1 mg bid    #Nicotine dependence  - nicotine - 7 mG/24Hr(s) Patch 1 patch Transdermal daily    #Cocaine use disorder  - no active signs of withdrawal    #PRN medications. c/w the following:  - diphenhydramine 50 mg PRN Rash and/or Itching  - haloperidol 5 mg q6h PRN agitation  - hydroxyzine hydrochloride 50 mg q6h PRN anxiety/insomnia  - lorazepam 2 mg PRN Agitation

## 2021-11-16 NOTE — PROGRESS NOTE BEHAVIORAL HEALTH - NSBHFUPADDHPIFT_PSY_A_CORE
"I stopped taking medicine 2 months ago"  VIRGILIO DAN is a 51y old white Male, single with no children, unemployed on SSI, living with his sister, with psychiatric history of reported schizoaffective disorder, bipolar type, cocaine use disorder, several prior IPP admissions, last in 2019 at [facility], presented for worsening paranoia. Psychiatry consulted for mental health evaluation.    Upon approach, patient was sleeping in chair with blanket covering his head. Patient guarded but cooperative with interview, making minimal eye contact, with soft speech. He reports that he stopped taking medication 2 months ago as he felt it was not helping with his feelings of paranoia and that they "don't take away the voices". He reports that he gets an injection but is unsure of the name of the medication or able to provide many details. Reports that he thinks he was taking seroquel "100 or 200 in the evening" but does not feel it was helpful. He reports that he stopped seeing psychiatrist but that it was at a clinic on [facility] near the [facility], however unable to provide name of psychiatrist. Reports that he hears "voices talking in my brain", that they say "all bad things" like "kill this one", and are "laughing, always laughing". Describes reported paranoia as "I think people wanna kill me", adding that it is "getting really really hard to deal with it". Reports feeling ambivalent about taking medication as he states that medication had been unhelpful with voices and paranoid feelings in the past and that they also make him feel "like a zombie". Reports that he is "unsure" of what would happen if he left the hospital but that the "paranoia has been very difficult".  Reports that he does use crack cocaine "once in a while", with last use reported "a couple of days ago". Reports intermittent alcohol use but denies recent use. He denies any other recent substance use.  Patient reports that he "does not wanna be around anymore" because of symptoms but denies any active suicidal ideation. Denies any manic symptoms at this time.  Patient states that he does not know his sister's phone number by heart and would prefer that she not be contacted at this time.

## 2021-11-16 NOTE — PROGRESS NOTE BEHAVIORAL HEALTH - NSBHADDHXPSYCHFT_PSY_A_CORE
Pt was discharged from [facility] on 8/9/19 on lexapro 10mg daily.  Reports he previously followed with a psychiatrist for "bipolar and schizophrenia", unsure of name or address

## 2021-11-16 NOTE — PROGRESS NOTE BEHAVIORAL HEALTH - NSBHCHARTREVIEWIMAGING_PSY_A_CORE FT
< from: CT Head No Cont (11.14.21 @ 12:09) >    MPRESSION:  No acute intracranial hemorrhage or mass effect.    < end of copied text >

## 2021-11-16 NOTE — PROGRESS NOTE BEHAVIORAL HEALTH - NSBHADDHXPSYSOCFT_PSY_A_CORE
living with sister, unemployed on SSI  was incarcerated up for five years for bank robbery in [place], got out in April 2019, per web search and [place]  have made several arrests for shoplifting and disorderly conduct, state last was October 2018, arrest in July 11, 2017 for violating parole with a stolen car, 2015 broke his hip getting chased with stolen car, 2014 also arrested while getting chased in a stolen car

## 2021-11-16 NOTE — PROGRESS NOTE BEHAVIORAL HEALTH - NSBHADDHXMEDFT_PSY_A_CORE
per records thorazine, haldol, mellaril, seroquel, lithium  -doesn't like haldol or thorazine, makes him feel "like a zombie"

## 2021-11-17 LAB
A1C WITH ESTIMATED AVERAGE GLUCOSE RESULT: 4.9 % — SIGNIFICANT CHANGE UP (ref 4–5.6)
ALBUMIN SERPL ELPH-MCNC: 4.3 G/DL — SIGNIFICANT CHANGE UP (ref 3.5–5.2)
ALP SERPL-CCNC: 57 U/L — SIGNIFICANT CHANGE UP (ref 30–115)
ALT FLD-CCNC: 16 U/L — SIGNIFICANT CHANGE UP (ref 0–41)
ANION GAP SERPL CALC-SCNC: 9 MMOL/L — SIGNIFICANT CHANGE UP (ref 7–14)
AST SERPL-CCNC: 20 U/L — SIGNIFICANT CHANGE UP (ref 0–41)
BILIRUB SERPL-MCNC: 0.5 MG/DL — SIGNIFICANT CHANGE UP (ref 0.2–1.2)
BUN SERPL-MCNC: 14 MG/DL — SIGNIFICANT CHANGE UP (ref 10–20)
CALCIUM SERPL-MCNC: 9.3 MG/DL — SIGNIFICANT CHANGE UP (ref 8.5–10.1)
CHLORIDE SERPL-SCNC: 106 MMOL/L — SIGNIFICANT CHANGE UP (ref 98–110)
CHOLEST SERPL-MCNC: 221 MG/DL — HIGH
CO2 SERPL-SCNC: 27 MMOL/L — SIGNIFICANT CHANGE UP (ref 17–32)
CREAT SERPL-MCNC: 1 MG/DL — SIGNIFICANT CHANGE UP (ref 0.7–1.5)
ESTIMATED AVERAGE GLUCOSE: 94 MG/DL — SIGNIFICANT CHANGE UP (ref 68–114)
GLUCOSE SERPL-MCNC: 93 MG/DL — SIGNIFICANT CHANGE UP (ref 70–99)
HCT VFR BLD CALC: 48.6 % — SIGNIFICANT CHANGE UP (ref 42–52)
HDLC SERPL-MCNC: 81 MG/DL — SIGNIFICANT CHANGE UP
HGB BLD-MCNC: 16.7 G/DL — SIGNIFICANT CHANGE UP (ref 14–18)
LIPID PNL WITH DIRECT LDL SERPL: 135 MG/DL — HIGH
MCHC RBC-ENTMCNC: 32.2 PG — HIGH (ref 27–31)
MCHC RBC-ENTMCNC: 34.4 G/DL — SIGNIFICANT CHANGE UP (ref 32–37)
MCV RBC AUTO: 93.8 FL — SIGNIFICANT CHANGE UP (ref 80–94)
NON HDL CHOLESTEROL: 140 MG/DL — HIGH
NRBC # BLD: 0 /100 WBCS — SIGNIFICANT CHANGE UP (ref 0–0)
PLATELET # BLD AUTO: 136 K/UL — SIGNIFICANT CHANGE UP (ref 130–400)
POTASSIUM SERPL-MCNC: 4.3 MMOL/L — SIGNIFICANT CHANGE UP (ref 3.5–5)
POTASSIUM SERPL-SCNC: 4.3 MMOL/L — SIGNIFICANT CHANGE UP (ref 3.5–5)
PROT SERPL-MCNC: 6.2 G/DL — SIGNIFICANT CHANGE UP (ref 6–8)
RBC # BLD: 5.18 M/UL — SIGNIFICANT CHANGE UP (ref 4.7–6.1)
RBC # FLD: 12.3 % — SIGNIFICANT CHANGE UP (ref 11.5–14.5)
SODIUM SERPL-SCNC: 142 MMOL/L — SIGNIFICANT CHANGE UP (ref 135–146)
TRIGL SERPL-MCNC: 143 MG/DL — SIGNIFICANT CHANGE UP
WBC # BLD: 6.94 K/UL — SIGNIFICANT CHANGE UP (ref 4.8–10.8)
WBC # FLD AUTO: 6.94 K/UL — SIGNIFICANT CHANGE UP (ref 4.8–10.8)

## 2021-11-17 PROCEDURE — 99232 SBSQ HOSP IP/OBS MODERATE 35: CPT

## 2021-11-17 RX ADMIN — RISPERIDONE 1 MILLIGRAM(S): 4 TABLET ORAL at 20:50

## 2021-11-17 RX ADMIN — RISPERIDONE 1 MILLIGRAM(S): 4 TABLET ORAL at 08:16

## 2021-11-17 NOTE — PROGRESS NOTE BEHAVIORAL HEALTH - NSBHFUPINTERVALCCFT_PSY_A_CORE
Pt insists he feels fine and is ready for d/c.  He states he came to the hospital voluntarily to get back on medicine, and now that has been accomplished he is ready to leave.  He has no phone number for his sister, and all efforts to reach her have been unsuccessful.  He said he will go to the shelter. Pt spoke with [facility]  today and we spoke together; as pt is not psychotic, and does not meet criteria for involuntary admission, d/c planning with shelter and outpt f/u are being pursued.  Pt is in agreement with this plan, and agrees to stay in hospital until arrangements made.

## 2021-11-18 LAB — SARS-COV-2 RNA SPEC QL NAA+PROBE: SIGNIFICANT CHANGE UP

## 2021-11-18 PROCEDURE — 99232 SBSQ HOSP IP/OBS MODERATE 35: CPT

## 2021-11-18 RX ORDER — ALBUTEROL 90 UG/1
2 AEROSOL, METERED ORAL EVERY 6 HOURS
Refills: 0 | Status: DISCONTINUED | OUTPATIENT
Start: 2021-11-18 | End: 2021-11-19

## 2021-11-18 RX ORDER — ALBUTEROL 90 UG/1
2 AEROSOL, METERED ORAL
Qty: 0 | Refills: 0 | DISCHARGE
Start: 2021-11-18

## 2021-11-18 RX ORDER — NICOTINE POLACRILEX 2 MG
0 GUM BUCCAL
Qty: 0 | Refills: 0 | DISCHARGE
Start: 2021-11-18

## 2021-11-18 RX ORDER — TRAMADOL HYDROCHLORIDE 50 MG/1
0 TABLET ORAL
Qty: 0 | Refills: 0 | DISCHARGE

## 2021-11-18 RX ORDER — TRAMADOL HYDROCHLORIDE 50 MG/1
25 TABLET ORAL ONCE
Refills: 0 | Status: DISCONTINUED | OUTPATIENT
Start: 2021-11-18 | End: 2021-11-18

## 2021-11-18 RX ORDER — RISPERIDONE 4 MG/1
1 TABLET ORAL
Qty: 0 | Refills: 0 | DISCHARGE
Start: 2021-11-18

## 2021-11-18 RX ADMIN — ALBUTEROL 2 PUFF(S): 90 AEROSOL, METERED ORAL at 18:13

## 2021-11-18 RX ADMIN — TRAMADOL HYDROCHLORIDE 25 MILLIGRAM(S): 50 TABLET ORAL at 16:33

## 2021-11-18 RX ADMIN — RISPERIDONE 1 MILLIGRAM(S): 4 TABLET ORAL at 09:27

## 2021-11-18 RX ADMIN — TRAMADOL HYDROCHLORIDE 25 MILLIGRAM(S): 50 TABLET ORAL at 17:30

## 2021-11-18 RX ADMIN — RISPERIDONE 1 MILLIGRAM(S): 4 TABLET ORAL at 20:07

## 2021-11-18 NOTE — PROGRESS NOTE BEHAVIORAL HEALTH - CASE SUMMARY
Mr Moses is a 51 year old man with schizoaffective disorder who presented to the ED for the evaluation of worsening Paranoia and disorganised behavior in the context of non compliance with medication. Patient continues to minimize his symptoms and has very poor insight into his psychiatric illness. he continues to be disorganized in his behavior and thought but denies having current suicidal ideations, intent or plan.   At this time, patient is considered a danger to himself and continues to need inpatient psychiatric hospitalization for medication management and symptom stabilization. We recommend increasing to Risperdal 1mg P.O BID  with the  plan to titrate accordingly when on the inpatient psychiatry floor.
mood pleasant; no s/h ideation. As noted pt is in agreement with tx plan.  Will be d/c'd when shelter jess approved and out pt f/u in place
as noted

## 2021-11-18 NOTE — PROGRESS NOTE BEHAVIORAL HEALTH - SUMMARY
VIRGILIO DAN is a 51y old white Male, single with no children, unemployed on SSI, living with his sister, with psychiatric history of reported schizoaffective disorder, bipolar type, cocaine use disorder, several prior [facility] admissions, last in 2019 at [facility], presented for worsening paranoia. Psychiatry consulted for mental health evaluation.    On evaluation, patient is presenting with worsening command auditory hallucinations and persecutory delusions in the context of medication non-adherence. While patient's chart is notable for prior history of suspected seeking secondary gains, his current presentation appears to warrant inpatient psychiatric hospitalization for safety, stabilization, and medication optimization. Initiated risperidone to target patient's psychotic symptoms. Admitted to [facility] on 9.27 legals.     On assessment today, patient is very discharge-oriented, and is denying acute psychotic symptoms and denies thoughts of wanting to hurt self or others. Patient reports that risperdal has been helping with the voices.     Plan:  # Admission Labs & paperwork   - [x] CBC, CMP, TSH, lipid panel, a1c ordered (for AM day after admission)   - [x] COVID swab routine (ordered Thursday this week)  - [x] C-SSRS, CGI, AIMS completed    #bipolar disorder with psychosis. c/w the following:  - risperdone 1 mg bid    #Nicotine dependence  - nicotine - 7 mG/24Hr(s) Patch 1 patch Transdermal daily    #Cocaine use disorder  - no active signs of withdrawal    #PRN medications. c/w the following:  - diphenhydramine 50 mg PRN Rash and/or Itching  - haloperidol 5 mg q6h PRN agitation  - hydroxyzine hydrochloride 50 mg q6h PRN anxiety/insomnia  - lorazepam 2 mg PRN Agitation VIRGILIO DAN is a 51y old white Male, single with no children, unemployed on SSI, living with his sister, with psychiatric history of reported schizoaffective disorder, bipolar type, cocaine use disorder, several prior [facility] admissions, last in 2019 at [facility], presented for worsening paranoia. Psychiatry consulted for mental health evaluation.    On evaluation, patient is presenting with worsening command auditory hallucinations and persecutory delusions in the context of medication non-adherence. While patient's chart is notable for prior history of suspected seeking secondary gains, his current presentation appears to warrant inpatient psychiatric hospitalization for safety, stabilization, and medication optimization. Initiated risperidone to target patient's psychotic symptoms. Admitted to [facility] on 9.27 legals.     On assessment,  patient is remains discharge-oriented, and is denying acute psychotic symptoms and denies thoughts of wanting to hurt self or others.    Plan:  #bipolar disorder with psychosis. c/w the following:  - risperdone 1 mg bid    #Nicotine dependence  - nicotine - 7 mG/24Hr(s) Patch 1 patch Transdermal daily    #Cocaine use disorder  - no active signs of withdrawal    #PRN medications. c/w the following:  - diphenhydramine 50 mg PRN Rash and/or Itching  - haloperidol 5 mg q6h PRN agitation  - hydroxyzine hydrochloride 50 mg q6h PRN anxiety/insomnia  - lorazepam 2 mg PRN Agitation

## 2021-11-18 NOTE — PROGRESS NOTE BEHAVIORAL HEALTH - ORIENTED TO PERSON
[address]  eMERGENCY dEPARTMENT eNCOUnter      Pt Name: Smith Walsh  MRN: [id number]  [place] 1994  Date of evaluation: 6/27/19      CHIEF COMPLAINT:  Chief Complaint   Patient presents with    Abscess     ingrown hair       HISTORY OF PRESENT ILLNESS    Smith Walsh is a 30 y.o. male who presents to the emergency room complaining of abscess to back of head. States that they noticed this area painful, red and swollen for the past several days. Pt reports it began as an ingrown hair. Denies drainage. No fever, chills, cp, sob, nausea, vomiting, abd pain. Pt is not diabetic. No drug use. No other complaints. Nursing Notes were reviewed. REVIEW OF SYSTEMS       Constitutional:  Per HPI  Eyes: No visual changes. Neck: No neck pain. Respiratory: Denies recent shortness of breath. Cardiac:  Denies recent chest pain. GI:  Per HPI  Musculoskeletal: Denies focal weakness. Neurologic: Denies headache or focal weakness. Skin:  rash    Negative in 10 essential Systems except as mentioned above and in the HPI. PAST MEDICAL HISTORY   PMH:  has no past medical history on file. Surgical History:  has no past surgical history on file. Social History:  reports that he has been smoking cigarettes. He has a 1.50 pack-year smoking history. He has never used smokeless tobacco. He reports that he drank about 1.2 oz of alcohol per week. He reports that he does not use drugs. Family History: None  Psychiatric History: None    Allergies:has No Known Allergies.       PHYSICAL EXAM     INITIAL VITALS: BP (!) 142/80   Pulse 78   Temp 97.4 °F (36.3 °C) (Oral)   Resp 18   Ht 5' 11\" (1.803 m)   Wt 150 lb (68 kg)   SpO2 99%   BMI 20.92 kg/m²   Constitutional:  Well developed   Eyes:  Pupils equal and readily reactive to light  HENT:  Atraumatic, external ears normal, nose normal, oropharynx moist. Neck- supple   Respiratory:  Clear to auscultation bilaterally with good air exchange,
Was instructed to return for any worsening of the abscess or surrounding cellulitis. Orders Placed This Encounter   Medications    DISCONTD: lidocaine 1 % injection 20 mL    sulfamethoxazole-trimethoprim (BACTRIM DS;SEPTRA DS) 800-160 MG per tablet 1 tablet    cephALEXin (KEFLEX) capsule 500 mg    HYDROcodone-acetaminophen (NORCO) 5-325 MG per tablet 1 tablet    sulfamethoxazole-trimethoprim (BACTRIM DS) 800-160 MG per tablet     Sig: Take 1 tablet by mouth 2 times daily for 10 days     Dispense:  20 tablet     Refill:  0    cephALEXin (KEFLEX) 500 MG capsule     Sig: Take 1 capsule by mouth 4 times daily for 10 days     Dispense:  40 capsule     Refill:  0       CONSULTS:  None      FINAL IMPRESSION      1.  Abscess          DISPOSITION/PLAN:  DISPOSITION Decision To Discharge 06/27/2019 06:37:05 PM        PATIENT REFERRED TO:  [facility] ED  [facility] [address] [place] [address] [address] [address]  [phone]    If symptoms worsen    pcp  see clinic list          DISCHARGE MEDICATIONS:  Discharge Medication List as of 6/27/2019  6:40 PM      START taking these medications    Details   sulfamethoxazole-trimethoprim (BACTRIM DS) 800-160 MG per tablet Take 1 tablet by mouth 2 times daily for 10 days, Disp-20 tablet, R-0Print      cephALEXin (KEFLEX) 500 MG capsule Take 1 capsule by mouth 4 times daily for 10 days, Disp-40 capsule, R-0Print             (Please note that portions of this note were completed with a voice recognition program.  Efforts were made to edit the dictations but occasionally words are mis-transcribed.)    Chanel Miranda PA-C  06/27/19 [id number]       Kamala Tompkins PA-C  07/02/19 [id number]
Yes

## 2021-11-18 NOTE — DISCHARGE NOTE BEHAVIORAL HEALTH - NSBHDCSWCOMMENTSFT_PSY_A_CORE
Discharge summary to be faxed to [facility] Discharge summary faxed to [facility] [phone] on 11/19 at 1130am

## 2021-11-18 NOTE — DISCHARGE NOTE BEHAVIORAL HEALTH - HPI (INCLUDE ILLNESS QUALITY, SEVERITY, DURATION, TIMING, CONTEXT, MODIFYING FACTORS, ASSOCIATED SIGNS AND SYMPTOMS)
"I stopped taking medicine 2 months ago"  VIRGILIO DAN is a 51y old white Male, single with no children, unemployed on SSI, living with his sister, with psychiatric history of reported schizoaffective disorder, bipolar type, cocaine use disorder, several prior IPP admissions, last in 2019 at [facility], presented for worsening paranoia. Psychiatry consulted for mental health evaluation.    Upon approach, patient was sleeping in chair with blanket covering his head. Patient guarded but cooperative with interview, making minimal eye contact, with soft speech. He reports that he stopped taking medication 2 months ago as he felt it was not helping with his feelings of paranoia and that they "don't take away the voices". He reports that he gets an injection but is unsure of the name of the medication or able to provide many details. Reports that he thinks he was taking seroquel "100 or 200 in the evening" but does not feel it was helpful. He reports that he stopped seeing psychiatrist but that it was at a clinic on [facility] near the [facility], however unable to provide name of psychiatrist. Reports that he hears "voices talking in my brain", that they say "all bad things" like "kill this one", and are "laughing, always laughing". Describes reported paranoia as "I think people wanna kill me", adding that it is "getting really really hard to deal with it". Reports feeling ambivalent about taking medication as he states that medication had been unhelpful with voices and paranoid feelings in the past and that they also make him feel "like a zombie". Reports that he is "unsure" of what would happen if he left the hospital but that the "paranoia has been very difficult".  Reports that he does use crack cocaine "once in a while", with last use reported "a couple of days ago". Reports intermittent alcohol use but denies recent use. He denies any other recent substance use.  Patient reports that he "does not wanna be around anymore" because of symptoms but denies any active suicidal ideation. Denies any manic symptoms at this time.  Patient states that he does not know his sister's phone number by heart and would prefer that she not be contacted at this time.

## 2021-11-18 NOTE — PROGRESS NOTE BEHAVIORAL HEALTH - NSBHCHARTREVIEWLAB_PSY_A_CORE FT
CAPILLARY BLOOD GLUCOSE
11-17    142  |  106  |  14  ----------------------------<  93  4.3   |  27  |  1.0    Ca    9.3      17 Nov 2021 07:19    TPro  6.2  /  Alb  4.3  /  TBili  0.5  /  DBili  x   /  AST  20  /  ALT  16  /  AlkPhos  57  11-17

## 2021-11-18 NOTE — DISCHARGE NOTE BEHAVIORAL HEALTH - NSBHDCRESPONSEFT_PSY_A_CORE
Pt was calm and cooperative and was in good behavioral control. Patient denied any suicidal or homicidal ideations. Patient denied any auditory or visual hallucinations.  Pt was evaluated by treatment team, pt is stable for discharge and patient shows no imminent danger to self, others or property at this time. Pt understands and agrees with treatment plan.

## 2021-11-18 NOTE — DISCHARGE NOTE BEHAVIORAL HEALTH - MEDICATION SUMMARY - MEDICATIONS TO TAKE
I will START or STAY ON the medications listed below when I get home from the hospital:    risperiDONE 1 mg oral tablet  -- 1 tab(s) by mouth 2 times a day x 14 days   -- Indication: For Psychosis    albuterol 90 mcg/inh inhalation aerosol  -- 2 puff(s) inhaled every 6 hours, As needed, Shortness of Breath and/or Wheezing. Continue to take unless told otherwise by physician.  -- Indication: For respiratory distress    nicotine 7 mg/24 hr transdermal film, extended release  -- 1 patch by transdermal patch once a day x 14 days   -- Indication: For Nicotine dependence

## 2021-11-18 NOTE — DISCHARGE NOTE BEHAVIORAL HEALTH - NSTOBACCOREFERRAL_GEN_A_NCS
Yes Patient registered and referred to the [place] [facility]. Phone appointment scheduled for 11/22/21 at 0900-/Yes

## 2021-11-18 NOTE — DISCHARGE NOTE BEHAVIORAL HEALTH - NSBHDCMEDSFT_PSY_A_CORE
- For bipolar disorder with psychosis, patient was placed on risperdone 1 mg bid.   - Nicotine dependence, patient was placed on nicotine - 7 mG/24Hr(s) Patch 1 patch Transdermal daily  - for Cocaine use disorder, no active signs of withdrawal, and so no meds were prescribed.   - For PRN medications, patient was ordered diphenhydramine 50 mg PRN Rash and/or Itching, haloperidol 5 mg q6h PRN agitation, hydroxyzine hydrochloride 50 mg q6h PRN anxiety/insomnia, lorazepam 2 mg PRN Agitation.  Patient responded well to medications - patient reports that the risperdal helped cause him to stop hearing voices.

## 2021-11-18 NOTE — PROGRESS NOTE BEHAVIORAL HEALTH - OTHER
no auditory hallucinations on exam; reported last hearing voices 2 days ago no auditory hallucinations on exam; previously reported auditory hallucinations

## 2021-11-18 NOTE — PROGRESS NOTE BEHAVIORAL HEALTH - NSBHCHARTREVIEWVS_PSY_A_CORE FT
Vital Signs Last 24 Hrs  T(C): 35.9 (16 Nov 2021 09:30), Max: 36.4 (15 Nov 2021 15:00)  T(F): 96.7 (16 Nov 2021 09:30), Max: 97.6 (15 Nov 2021 15:00)  HR: 8 (16 Nov 2021 09:30) (8 - 83)  BP: 117/65 (16 Nov 2021 09:30) (96/64 - 117/65)  BP(mean): --  RR: 18 (16 Nov 2021 09:30) (16 - 18)  SpO2: 100% (15 Nov 2021 18:02) (98% - 100%)
Vital Signs Last 24 Hrs  T(C): 36.2 (18 Nov 2021 08:58), Max: 36.2 (18 Nov 2021 08:58)  T(F): 97.2 (18 Nov 2021 08:58), Max: 97.2 (18 Nov 2021 08:58)  HR: 60 (18 Nov 2021 08:58) (60 - 68)  BP: 102/61 (18 Nov 2021 08:58) (97/60 - 102/61)  BP(mean): --  RR: 18 (18 Nov 2021 08:58) (18 - 18)  SpO2: --

## 2021-11-18 NOTE — DISCHARGE NOTE BEHAVIORAL HEALTH - NSBHDCSUBSTHXFT_PSY_A_CORE
Alcohol - reports intermittent use, denying recent use  Cocaine/Crack - reports intermittent use, last smoked crack a few days ago

## 2021-11-18 NOTE — PROGRESS NOTE BEHAVIORAL HEALTH - NSBHATTESTSEENBY_PSY_A_CORE
Attending Psychiatrist supervising NP/Trainee, meeting pt...
attending Psychiatrist without NP/Trainee
Attending Psychiatrist supervising NP/Trainee, meeting pt...
Attending Psychiatrist supervising NP/Trainee, meeting pt...
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
## 2021-11-18 NOTE — PROGRESS NOTE BEHAVIORAL HEALTH - NSBHFUPINTERVALHXFT_PSY_A_CORE
THIS IS AN INCOMPLETE NOTE . FULL NOTE TO FOLLOW SHORTLY Per staff overnight patient was irritable about leaving, but in behavioral control.     Chart reviewed, patient seen and evaluated in AM. On evaluation, patient reports that he wants to be discharged soon. Patient endorsed good sleep and appetite. Reports that he is compliant with medications and denies side effects. Patient reports that he is amenable to discharge to shelter.     Patient reports no suicidal ideation, intent or plan. Reports no homicidal ideation, intent or plan. Denied auditory/visual hallucinations.

## 2021-11-18 NOTE — PROGRESS NOTE BEHAVIORAL HEALTH - NSBHFUPINTERVALCCFT_PSY_A_CORE
Pt insists he feels fine and is ready for d/c.  He states he came to the hospital voluntarily to get back on medicine, and now that has been accomplished he is ready to leave.  He has no phone number for his sister, and all efforts to reach her have been unsuccessful.  He said he will go to the shelter. Pt spoke with hospitals  today and we spoke together; as pt is not psychotic, and does not meet criteria for involuntary admission, d/c planning with shelter and outpt f/u are being pursued.  Pt is in agreement with this plan, and agrees to stay in hospital until arrangements made. THIS IS AN INCOMPLETE NOTE . FULL NOTE TO FOLLOW SHORTLY

## 2021-11-18 NOTE — PROGRESS NOTE BEHAVIORAL HEALTH - MODIFICATIONS
none
seen and discussed with resident.
seen and discussed with resident.  no s/h ideation or psychosis. meds restarted. Will continue care and attempt to contact collateral.

## 2021-11-18 NOTE — DISCHARGE NOTE BEHAVIORAL HEALTH - NSBHDCTHERAPYFT_PSY_A_CORE
Patient was engaged in group milieu, and supportive therapies while on the unit.  Treatment team was conducted weekly with the participating of nurse, , and providers.

## 2021-11-18 NOTE — PROGRESS NOTE BEHAVIORAL HEALTH - NSBHCHARTREVIEWINVESTIGATE_PSY_A_CORE FT
< from: 12 Lead ECG (11.14.21 @ 12:17) >    QTC Calculation(Bazett) 439 ms    < end of copied text >

## 2021-11-19 VITALS
SYSTOLIC BLOOD PRESSURE: 102 MMHG | RESPIRATION RATE: 16 BRPM | DIASTOLIC BLOOD PRESSURE: 86 MMHG | HEART RATE: 79 BPM | TEMPERATURE: 97 F

## 2021-11-19 PROCEDURE — 99238 HOSP IP/OBS DSCHRG MGMT 30/<: CPT

## 2021-11-19 RX ORDER — RISPERIDONE 4 MG/1
1 TABLET ORAL
Qty: 28 | Refills: 0
Start: 2021-11-19 | End: 2022-04-17

## 2021-11-19 RX ORDER — NICOTINE POLACRILEX 2 MG
1 GUM BUCCAL
Qty: 14 | Refills: 0
Start: 2021-11-19 | End: 2021-12-02

## 2021-11-19 RX ORDER — RISPERIDONE 4 MG/1
1 TABLET ORAL
Qty: 28 | Refills: 0
Start: 2021-11-19 | End: 2021-12-02

## 2021-11-19 RX ADMIN — RISPERIDONE 1 MILLIGRAM(S): 4 TABLET ORAL at 08:36

## 2021-11-19 RX ADMIN — Medication 1 PATCH: at 08:36

## 2021-11-19 NOTE — CHART NOTE - NSCHARTNOTEFT_GEN_A_CORE
Patient reported new onset of neck pain due to prior hx of neck injury s/p surgery. Patient has intolerance to motrin, tylenol. Patient reported that tramadol in the past has helped. Labs, reviewed, including renal function. Ordered 1x dose of tramadol for pain control of acute neck pain.    Patient also reported home medication of inhaler PRN for difficulty breathing due to reported COPD. Ordered PRN albuterol inhaler.
Social Work Admit Note:    Patient is a 51 years of age male who was admitted for evaluation of psychotic symptoms.  At the time of assessment in the emergency department, patient informed that he stopped taking his psychiatric medications 2 months ago because he felt like they weren’t working.  Since then, his paranoia has been getting worse and he feels as if “people wanna kill me".  Patient also endorsed hearing "voices talking in my brain" that are saying "all bad things".  He informed that his symptoms have been very difficult to deal with.  He was admitted to [facility] for safety and stabilization.    In the community, patient is domiciled in a private residence with his sister.  He is single marital status and has no known dependents.  Patient is unemployed, on SSI.  He has a past psychiatric history of schizoaffective disorder, bipolar type.  He has several prior [facility] admissions, most recently at [facility] in 2019.  Patient also has a history of cocaine use disorder.  Patient reports he currently uses crack cocaine “once in awhile”.      Sexual History – Patient identifies as heterosexual      Family relationships and history – Patients sister is his primary social support  Leisure Activity Assessment – Watching TV    Community Supports – None known  Employment – Patient is unemployed.      Substance Use Assessment – Patient endorses use of crack cocaine   History of suicidality or self- injurious behaviors – No  Significant Loses – None known    Life Goals – Patient verbalized goal of restarting his psychiatric medications.      will continue to meet with patient 1:1 and with treatment team daily.  Discharge plan is for continued mental health treatment in outpatient setting.       Please refer to Social Work Psychosocial for additional information.
pt shelter jess approved, and he continues to state he wants to pursue this disposition.  No psychosis or s/h ideation.  Will d/c today with script and f/u inplace. Continued compliance stressed
Social Work Discharge Note:    Patient is for discharge today.   He is alert and oriented x3.  Mood has improved.  Anxiety has decreased.  Insight and judgment have improved.  Suicidal/homicidal ideation denied.     Patient will be discharged to the [facility] - [address] [place] [address]      He has been referred to the [facility] for outpatient CH[facility] treatment.     Patient is aware and agreeable to discharge today.

## 2021-11-24 DIAGNOSIS — F22 DELUSIONAL DISORDERS: ICD-10-CM

## 2021-11-24 DIAGNOSIS — R21 RASH AND OTHER NONSPECIFIC SKIN ERUPTION: ICD-10-CM

## 2021-11-24 DIAGNOSIS — R45.1 RESTLESSNESS AND AGITATION: ICD-10-CM

## 2021-11-24 DIAGNOSIS — G47.00 INSOMNIA, UNSPECIFIED: ICD-10-CM

## 2021-11-24 DIAGNOSIS — F25.0 SCHIZOAFFECTIVE DISORDER, BIPOLAR TYPE: ICD-10-CM

## 2021-11-24 DIAGNOSIS — Z91.14 PATIENT'S OTHER NONCOMPLIANCE WITH MEDICATION REGIMEN: ICD-10-CM

## 2021-11-24 DIAGNOSIS — F17.210 NICOTINE DEPENDENCE, CIGARETTES, UNCOMPLICATED: ICD-10-CM

## 2021-11-24 DIAGNOSIS — Z91.52 PERSONAL HISTORY OF NONSUICIDAL SELF-HARM: ICD-10-CM

## 2021-11-24 DIAGNOSIS — J44.9 CHRONIC OBSTRUCTIVE PULMONARY DISEASE, UNSPECIFIED: ICD-10-CM

## 2021-11-24 DIAGNOSIS — J45.909 UNSPECIFIED ASTHMA, UNCOMPLICATED: ICD-10-CM

## 2021-11-24 DIAGNOSIS — Z88.6 ALLERGY STATUS TO ANALGESIC AGENT: ICD-10-CM

## 2021-11-24 DIAGNOSIS — F14.10 COCAINE ABUSE, UNCOMPLICATED: ICD-10-CM

## 2022-03-02 ENCOUNTER — EMERGENCY (EMERGENCY)
Facility: HOSPITAL | Age: 52
LOS: 0 days | Discharge: HOME | End: 2022-03-02
Attending: STUDENT IN AN ORGANIZED HEALTH CARE EDUCATION/TRAINING PROGRAM | Admitting: STUDENT IN AN ORGANIZED HEALTH CARE EDUCATION/TRAINING PROGRAM
Payer: MEDICAID

## 2022-03-02 VITALS
HEART RATE: 92 BPM | HEIGHT: 68 IN | WEIGHT: 175.05 LBS | RESPIRATION RATE: 18 BRPM | OXYGEN SATURATION: 99 % | TEMPERATURE: 97 F | SYSTOLIC BLOOD PRESSURE: 110 MMHG | DIASTOLIC BLOOD PRESSURE: 72 MMHG

## 2022-03-02 VITALS
HEART RATE: 84 BPM | OXYGEN SATURATION: 98 % | TEMPERATURE: 97 F | RESPIRATION RATE: 18 BRPM | DIASTOLIC BLOOD PRESSURE: 77 MMHG | SYSTOLIC BLOOD PRESSURE: 112 MMHG

## 2022-03-02 DIAGNOSIS — J45.909 UNSPECIFIED ASTHMA, UNCOMPLICATED: ICD-10-CM

## 2022-03-02 DIAGNOSIS — Z23 ENCOUNTER FOR IMMUNIZATION: ICD-10-CM

## 2022-03-02 DIAGNOSIS — S00.412A ABRASION OF LEFT EAR, INITIAL ENCOUNTER: ICD-10-CM

## 2022-03-02 DIAGNOSIS — R68.84 JAW PAIN: ICD-10-CM

## 2022-03-02 DIAGNOSIS — Z88.6 ALLERGY STATUS TO ANALGESIC AGENT: ICD-10-CM

## 2022-03-02 DIAGNOSIS — M54.2 CERVICALGIA: ICD-10-CM

## 2022-03-02 DIAGNOSIS — Z91.013 ALLERGY TO SEAFOOD: ICD-10-CM

## 2022-03-02 DIAGNOSIS — S06.9X1A UNSPECIFIED INTRACRANIAL INJURY WITH LOSS OF CONSCIOUSNESS OF 30 MINUTES OR LESS, INITIAL ENCOUNTER: ICD-10-CM

## 2022-03-02 DIAGNOSIS — Z98.890 OTHER SPECIFIED POSTPROCEDURAL STATES: Chronic | ICD-10-CM

## 2022-03-02 DIAGNOSIS — S02.671A: ICD-10-CM
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
## 2022-03-02 DIAGNOSIS — R51.9 HEADACHE, UNSPECIFIED: ICD-10-CM

## 2022-03-02 PROCEDURE — 99285 EMERGENCY DEPT VISIT HI MDM: CPT

## 2022-03-02 PROCEDURE — 70450 CT HEAD/BRAIN W/O DYE: CPT | Mod: 26,MA

## 2022-03-02 PROCEDURE — 70486 CT MAXILLOFACIAL W/O DYE: CPT | Mod: 26,MA

## 2022-03-02 PROCEDURE — 72125 CT NECK SPINE W/O DYE: CPT | Mod: 26,MA

## 2022-03-02 PROCEDURE — 72170 X-RAY EXAM OF PELVIS: CPT | Mod: 26

## 2022-03-02 PROCEDURE — 71045 X-RAY EXAM CHEST 1 VIEW: CPT | Mod: 26

## 2022-03-02 RX ORDER — TETANUS TOXOID, REDUCED DIPHTHERIA TOXOID AND ACELLULAR PERTUSSIS VACCINE, ADSORBED 5; 2.5; 8; 8; 2.5 [IU]/.5ML; [IU]/.5ML; UG/.5ML; UG/.5ML; UG/.5ML
0.5 SUSPENSION INTRAMUSCULAR ONCE
Refills: 0 | Status: COMPLETED | OUTPATIENT
Start: 2022-03-02 | End: 2022-03-02

## 2022-03-02 RX ADMIN — TETANUS TOXOID, REDUCED DIPHTHERIA TOXOID AND ACELLULAR PERTUSSIS VACCINE, ADSORBED 0.5 MILLILITER(S): 5; 2.5; 8; 8; 2.5 SUSPENSION INTRAMUSCULAR at 08:06

## 2022-03-02 NOTE — ED PROVIDER NOTE - PROGRESS NOTE DETAILS
NC: Spoke to dental resident, pt with nondisplaced R mandibular fracture, will send pt to dental clinic at this time

## 2022-03-02 NOTE — ED PROVIDER NOTE - NSFOLLOWUPINSTRUCTIONS_ED_ALL_ED_FT
Mandibular Fracture     A mandibular fracture is a break in the jawbone.  What are the causes?  The most common cause of this injury is a direct blow (trauma) to the jaw. This can happen from:  A car crash.Physical violence.A fall from a high place.What are the signs or symptoms?  Symptoms of this injury include:  Pain.Swelling.Difficulty closing the mouth.Feeling that your teeth are not aligned properly when you close your mouth (malocclusion).Difficulty speaking.Difficulty swallowing.How is this diagnosed?  This injury may be diagnosed with a medical history and physical exam. Your health care provider may order imaging tests, such as an X-ray or CT scan.  How is this treated?  This injury may be treated with:  Rest. For mild fractures, resting the jaw may be the only treatment needed.Surgery to put the jaw back in the right position. During surgery, wires are usually placed around the teeth to hold the jaw in place while it heals.Taking medicine for pain.Applying ice to the jaw. This can help with pain and swelling.Eating a soft or liquid diet.Follow these instructions at home:  If directed, apply ice to the injured area:  Put ice in a plastic bag.Place a towel between your skin and the bag.Leave the ice on for 20 minutes, 2–3 times per day.Take over-the-counter and prescription medicines only as told by your health care provider.Eat a well-balanced, high-protein soft or liquid diet as told by your health care provider.Sleep on your back to avoid putting pressure on your jaw.Avoid exercising to the point that you become short of breath.Contact a health care provider if:  You have a severe headache.Parts of your face feel numb.You have severe jaw pain that is not relieved with medicine.You have uncontrollable nausea or anxiety.Your swelling or redness gets worse.Get help right away if:  You have a fever.You have difficulty breathing.You feel like your airway is tightening.You cannot swallow your saliva.You make a high-pitched whistling sound when you breathe (wheezing).This information is not intended to replace advice given to you by your health care provider. Make sure you discuss any questions you have with your health care provider.
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
## 2022-03-02 NOTE — ED ADULT NURSE NOTE - NSIMPLEMENTINTERV_GEN_ALL_ED
Implemented All Universal Safety Interventions:  [place] to call system. Call bell, personal items and telephone within reach. Instruct patient to call for assistance. Room bathroom lighting operational. Non-slip footwear when patient is off stretcher. Physically safe environment: no spills, clutter or unnecessary equipment. Stretcher in lowest position, wheels locked, appropriate side rails in place.
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
## 2022-03-02 NOTE — ED ADULT TRIAGE NOTE - WILL THE PATIENT ACCEPT THE PFIZER COVID-19 VACCINE IF ELIGIBLE AND IT IS AVAILABLE?
[FreeTextEntry3] : Medical record entries made by the scribe today, were at my direction and personally dictated to them by me, Dr. Janey Truong on 04/13/2021. I have reviewed the chart and agree that the record accurately reflects my personal performance of the history, physical exam, assessment and plan.  Not applicable

## 2022-03-02 NOTE — ED PROVIDER NOTE - PHYSICAL EXAMINATION
GENERAL: Well-nourished, Well-developed. NAD.  HEAD: No visible or palpable bumps or hematomas. No ecchymosis behind ears B/L.  Eyes: PERRLA, EOMI. No asymmetry. No nystagmus. No conjunctival injection. Non-icteric sclera.  ENMT: MMM. No pharyngeal erythema or exudates. Uvula midline. No oral lesions. No broken teeth. TMs clear with good cone of light B/L, no hemotympanum. (+)L ear external auricle abrasion with small amount of dry blood.   Neck: Supple. No cervical midline TTP. (+)paravertebral TTP to traps. FROM  CVS: No reproducible chest wall tenderness. Normal S1,S2. No murmurs appreciated on auscultation   RESP: No use of accessory muscles. Chest rise symmetrical with good expansion. Lungs clear to auscultation B/L. No wheezing, rales, or rhonchi auscultated.  GI: Normal auscultation of bowel sounds in all 4 quadrants. Soft, Nontender, Nondistended. No guarding or rebound tenderness. No CVAT B/L.  MSK: Extremities w/o deformity or ttp. No visible signs of trauma such as ecchymosis, erythema, or swelling. FROM of upper and lower extremities B/L. No midline spinal TTP. FROM of back with flexion and extension.  Skin: Warm, Dry. No rashes or lesions. Good cap refill < 2 sec B/L.  EXT: Radial and pedal pulses present B/L. No calf tenderness or swelling B/L. No palpable cords. No pedal edema B/L.  Neuro: AA&O x 3. Sensation grossly intact. Strength 5/5 B/L. Gait within normal limits. GENERAL: Well-nourished, Well-developed. NAD.  HEAD: No visible or palpable bumps or hematomas. No ecchymosis behind ears B/L.  Eyes: PERRLA, EOMI. No asymmetry. No nystagmus. No conjunctival injection. Non-icteric sclera.  ENMT: MMM. No pharyngeal erythema or exudates. Uvula midline. No oral lesions. No broken teeth. TMs clear with good cone of light B/L, no hemotympanum. (+)L ear external auricle abrasion with small amount of dry blood. (+)R mandible swelling ttp  Neck: Supple. No cervical midline TTP. (+)paravertebral TTP to traps. FROM  CVS: No reproducible chest wall tenderness. Normal S1,S2. No murmurs appreciated on auscultation   RESP: No use of accessory muscles. Chest rise symmetrical with good expansion. Lungs clear to auscultation B/L. No wheezing, rales, or rhonchi auscultated.  GI: Normal auscultation of bowel sounds in all 4 quadrants. Soft, Nontender, Nondistended. No guarding or rebound tenderness. No CVAT B/L.  MSK: Extremities w/o deformity or ttp. No visible signs of trauma such as ecchymosis, erythema, or swelling. FROM of upper and lower extremities B/L. No midline spinal TTP. FROM of back with flexion and extension.  Skin: Warm, Dry. No rashes or lesions. Good cap refill < 2 sec B/L.  EXT: Radial and pedal pulses present B/L. No calf tenderness or swelling B/L. No palpable cords. No pedal edema B/L.  Neuro: AA&O x 3. Sensation grossly intact. Strength 5/5 B/L. Gait within normal limits.

## 2022-03-02 NOTE — ED PROVIDER NOTE - NS ED ROS FT
Constitutional: (-) fever (-) malaise (-) diaphoresis (-) chills (-) wt. loss (-) bodyaches (-) night sweats  Eyes: (-) visual changes (-) eye pain (-) eye discharge (-) photophobia (-) FB sensation  ENMT: (-) nasal or chest congestion (-) runny nose (-) sore throat (-) hoarseness  (-) hearing changes (-) ear pain (-) ear discharge or infections (+) neck pain (-) neck stiffness  Cardiac: (-) chest pain  (-) palpitations (-) syncope (-) edema  Respiratory: (-) cough (-) SOB (-) FU  GI: (-) nausea (-) vomiting (-) diarrhea (-) abdominal pain  : (-) dysuria (-) increased frequency  (-) hematuria (-) incontinence  MS: (-) back pain (-) myalgia (-) muscle weakness (-)  joint pain  Neuro: (+) headache (-) dizziness (-) numbness/tingling to extremities B/L (-) weakness (+) LOC (+) head injury (-) AMS (-) saddle anesthesia (-) tremors  Skin: (-) rash (+) laceration    Except as documented in the HPI, all other systems are negative.

## 2022-03-02 NOTE — ED ADULT NURSE REASSESSMENT NOTE - NS ED NURSE REASSESS COMMENT FT1
pt sleeping. pt with fall alarms in place. call light near pt. frequent rounds made. awaiting discharge at this time.

## 2022-03-14 NOTE — PATIENT PROFILE BEHAVIORAL HEALTH - FUNCTIONAL LEVEL PRIOR: TRANSFERRING
0 = independent Oxybutynin Pregnancy And Lactation Text: This medication is Pregnancy Category B and is considered safe during pregnancy. It is unknown if it is excreted in breast milk.

## 2022-04-04 ENCOUNTER — EMERGENCY (EMERGENCY)
Facility: HOSPITAL | Age: 52
LOS: 0 days | Discharge: HOME | End: 2022-04-04
Attending: STUDENT IN AN ORGANIZED HEALTH CARE EDUCATION/TRAINING PROGRAM | Admitting: STUDENT IN AN ORGANIZED HEALTH CARE EDUCATION/TRAINING PROGRAM
Payer: MEDICAID

## 2022-04-04 ENCOUNTER — EMERGENCY (EMERGENCY)
Facility: HOSPITAL | Age: 52
LOS: 0 days | Discharge: HOME | End: 2022-04-04
Attending: EMERGENCY MEDICINE | Admitting: EMERGENCY MEDICINE
Payer: MEDICAID

## 2022-04-04 VITALS
OXYGEN SATURATION: 98 % | RESPIRATION RATE: 17 BRPM | HEIGHT: 68 IN | DIASTOLIC BLOOD PRESSURE: 59 MMHG | HEART RATE: 81 BPM | WEIGHT: 164.91 LBS | TEMPERATURE: 96 F | SYSTOLIC BLOOD PRESSURE: 106 MMHG

## 2022-04-04 VITALS
DIASTOLIC BLOOD PRESSURE: 94 MMHG | RESPIRATION RATE: 16 BRPM | HEIGHT: 68 IN | HEART RATE: 70 BPM | TEMPERATURE: 98 F | OXYGEN SATURATION: 99 % | SYSTOLIC BLOOD PRESSURE: 120 MMHG

## 2022-04-04 DIAGNOSIS — Z88.6 ALLERGY STATUS TO ANALGESIC AGENT: ICD-10-CM

## 2022-04-04 DIAGNOSIS — R45.1 RESTLESSNESS AND AGITATION: ICD-10-CM

## 2022-04-04 DIAGNOSIS — Z91.012 ALLERGY TO EGGS: ICD-10-CM

## 2022-04-04 DIAGNOSIS — Z98.1 ARTHRODESIS STATUS: ICD-10-CM

## 2022-04-04 DIAGNOSIS — M54.2 CERVICALGIA: ICD-10-CM

## 2022-04-04 DIAGNOSIS — J45.909 UNSPECIFIED ASTHMA, UNCOMPLICATED: ICD-10-CM

## 2022-04-04 DIAGNOSIS — G89.29 OTHER CHRONIC PAIN: ICD-10-CM

## 2022-04-04 DIAGNOSIS — F42.9 OBSESSIVE-COMPULSIVE DISORDER, UNSPECIFIED: ICD-10-CM

## 2022-04-04 DIAGNOSIS — F31.9 BIPOLAR DISORDER, UNSPECIFIED: ICD-10-CM

## 2022-04-04 DIAGNOSIS — F25.9 SCHIZOAFFECTIVE DISORDER, UNSPECIFIED: ICD-10-CM

## 2022-04-04 DIAGNOSIS — R44.1 VISUAL HALLUCINATIONS: ICD-10-CM

## 2022-04-04 DIAGNOSIS — Z98.890 OTHER SPECIFIED POSTPROCEDURAL STATES: Chronic | ICD-10-CM

## 2022-04-04 DIAGNOSIS — R45.89 OTHER SYMPTOMS AND SIGNS INVOLVING EMOTIONAL STATE: ICD-10-CM

## 2022-04-04 DIAGNOSIS — Z91.013 ALLERGY TO SEAFOOD: ICD-10-CM

## 2022-04-04 DIAGNOSIS — M62.838 OTHER MUSCLE SPASM: ICD-10-CM

## 2022-04-04 DIAGNOSIS — F17.200 NICOTINE DEPENDENCE, UNSPECIFIED, UNCOMPLICATED: ICD-10-CM

## 2022-04-04 DIAGNOSIS — F17.210 NICOTINE DEPENDENCE, CIGARETTES, UNCOMPLICATED: ICD-10-CM

## 2022-04-04 DIAGNOSIS — R44.0 AUDITORY HALLUCINATIONS: ICD-10-CM

## 2022-04-04 DIAGNOSIS — Z87.81 PERSONAL HISTORY OF (HEALED) TRAUMATIC FRACTURE: ICD-10-CM

## 2022-04-04 DIAGNOSIS — Z88.8 ALLERGY STATUS TO OTHER DRUGS, MEDICAMENTS AND BIOLOGICAL SUBSTANCES STATUS: ICD-10-CM

## 2022-04-04 DIAGNOSIS — J44.9 CHRONIC OBSTRUCTIVE PULMONARY DISEASE, UNSPECIFIED: ICD-10-CM

## 2022-04-04 PROCEDURE — 99283 EMERGENCY DEPT VISIT LOW MDM: CPT

## 2022-04-04 PROCEDURE — 90792 PSYCH DIAG EVAL W/MED SRVCS: CPT | Mod: GC

## 2022-04-04 RX ORDER — KETOROLAC TROMETHAMINE 30 MG/ML
15 SYRINGE (ML) INJECTION ONCE
Refills: 0 | Status: DISCONTINUED | OUTPATIENT
Start: 2022-04-04 | End: 2022-04-04

## 2022-04-04 RX ORDER — METHOCARBAMOL 500 MG/1
500 TABLET, FILM COATED ORAL ONCE
Refills: 0 | Status: COMPLETED | OUTPATIENT
Start: 2022-04-04 | End: 2022-04-04

## 2022-04-04 RX ORDER — FAMOTIDINE 10 MG/ML
20 INJECTION INTRAVENOUS ONCE
Refills: 0 | Status: COMPLETED | OUTPATIENT
Start: 2022-04-04 | End: 2022-04-04

## 2022-04-04 RX ORDER — TIZANIDINE 4 MG/1
1 TABLET ORAL
Qty: 9 | Refills: 0
Start: 2022-04-04 | End: 2022-04-06

## 2022-04-04 RX ADMIN — Medication 15 MILLIGRAM(S): at 11:00

## 2022-04-04 RX ADMIN — FAMOTIDINE 20 MILLIGRAM(S): 10 INJECTION INTRAVENOUS at 11:01

## 2022-04-04 RX ADMIN — METHOCARBAMOL 500 MILLIGRAM(S): 500 TABLET, FILM COATED ORAL at 11:00
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
## 2022-04-04 NOTE — ED PROVIDER NOTE - NSFOLLOWUPCLINICS_GEN_ALL_ED_FT
[facility]  Medicine  [address], [place]   Phone: [phone]  Fax:     [facility] ([facility])  Rehabilitation  [facility] [address], [address] [place] [address]  Phone: [phone]  Fax:      [facility]  Medicine  [address], [place]   Phone: [phone]  Fax:     [facility] ([facility])  Rehabilitation  [facility] [address], [address] [place] [address]  Phone: [phone]  Fax:     [facility]  OP Mental Health  [address] [place] [id number]  Phone: [phone]  Fax:   Follow Up Time: 1-3 Days
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
## 2022-04-04 NOTE — ED BEHAVIORAL HEALTH ASSESSMENT NOTE - DESCRIPTION
Patient had arrived to ED in early morning with complaints about pain, was discharged, and then returned, asking to speak with psychiatry. No 1:1 at this time as patient is not aggressive or agitated. lives with sister, unemployed chronic neck pain, mandibular fracture, asthma, copd

## 2022-04-04 NOTE — ED PROVIDER NOTE - CLINICAL SUMMARY MEDICAL DECISION MAKING FREE TEXT BOX
50 yo man with chronic neck pain presents with complaint that he "wants an MRI".  Patient is neurologically normal.  Examination was unreamrkable.  Appears to have some mild radicular pain and muscle spasm.  Tizanidine and discharge.

## 2022-04-04 NOTE — ED ADULT TRIAGE NOTE - CHIEF COMPLAINT QUOTE
"i'm stressed out, no one is helping me with my medical stuff. I want to see a shrink" - patient  denies s/i & h/i

## 2022-04-04 NOTE — ED BEHAVIORAL HEALTH ASSESSMENT NOTE - RISK ASSESSMENT
Low Acute Suicide Risk risk factors - feelings of paranoia, auditory hallucinations (not distressing), substances use, impulsivity, hx of legal issues including incarceration, medication non-adherent, not linked to care, uncontrolled pain  protective factors - denies SI/HI

## 2022-04-04 NOTE — ED PROVIDER NOTE - NSFOLLOWUPCLINICSTOKEN_GEN_ALL_ED_FT
[id number]: || ||00\01||False;[id number]: || ||00\01||False; [id number]: || ||00\01||False;[id number]: || ||00\01||False;[id number]:1-3 Days|| ||00\01||False;

## 2022-04-04 NOTE — ED PROVIDER NOTE - PATIENT PORTAL LINK FT
You can access the FollowMyHealth Patient Portal offered by [facility] by registering at the following website: [email or web address]. By joining [facility]’s FollowMyHealth portal, you will also be able to view your health information using other applications (apps) compatible with our system.

## 2022-04-04 NOTE — ED PROVIDER NOTE - PHYSICAL EXAMINATION
Constitutional: Well developed, well nourished. NAD    	Head: Normocephalic, atraumatic.  	Eyes: PERRL, EOMI.  	ENT: No nasal discharge. Mucous membranes dry.  	Neck: Supple. Painless ROM;   	Cardiovascular: Normal S1, S2. Regular rate and rhythm.    	Pulmonary:  Lungs clear to auscultation bilaterally.    	Abdominal: Soft. Nondistended. No rebound, guarding, rigidity.  	Extremities. Pelvis stable. No lower extremity edema, symmetric calves.  	Skin: No rashes, cyanosis.  	Neuro: AAOx3. No focal neurological deficits.  Psych: Normal mood. Normal affect.
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
## 2022-04-04 NOTE — ED PROVIDER NOTE - ATTENDING CONTRIBUTION TO CARE
51yM schizoaffective disorder p/w AH and VH requesting to speak with psych.  No SI/HI.  Pt calm and cooperative in the ED w/o aggressive/violent behavior and requesting food.

## 2022-04-04 NOTE — ED PROVIDER NOTE - NS ED ROS FT
Constitutional: No fever, chills.  Eyes: No visual changes.  ENT: No hearing changes.  Neck: see hpi.  Cardiovascular: No chest pain, palpitations, edema.  Pulmonary: No SOB, cough. No hemoptysis.  Abdominal:  No nausea, vomiting, diarrhea.  : No dysuria, frequency.  Neuro: No headache, syncope, dizziness.  MS: No back pain. No calf pain/swelling.  Psych: No suicidal ideations.
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
## 2022-04-04 NOTE — ED ADULT NURSE NOTE - PAIN RATING/NUMBER SCALE (0-10): ACTIVITY
cc:

KINSEY YOUSIF

****

 

 

DATE OF CONSULTATION

9/1/17

 

HISTORY OF PRESENT ILLNESS

Mr. Monroe is a 75 year old white male with congenital bronchiectasis whom

I have followed for at least a decade.  He had been remarkably stable until

several months ago when he began to develop increasing dyspnea, cough and

congestion.  We repeated pulmonary functions which were down at about 10-15%

and a CT scan which was actually unchanged.  However, he chronically grows

Pseudomonas.

 

At that point, we began to address the bronchiectasis because he had no other

obvious issues causing dyspnea.  He was begun on bronchodilators, oral

prednisone, antibiotics for Pseudomonas which he grows chronically,  inhaled

camille and had had some courses of prednisone.  He seemed to get some response,

but he never really  recovered completely.

 

I saw him back in the office yesterday and he was clearly more dyspneic.  At

that point, his heart rate was quite rapid and I walked him in the office and

it jumped from about 110-130 and became irregular.  He was referred to the

emergency room.

 

In the emergency room yesterday, an EKG was done which did in fact reveal

atrial fibrillation.  This was new onset.  He had seen Dr. Murry in the past,

but he had never had atrial fibrillation documented to my knowledge.  A CTA was

done which revealed chronic bronchiectasis, but no pulmonary embolism.

 

I saw him in his room today.  He is feeling a little better.  I spoke to Dr. Arthur who has started him on oral Cardizem. Heart rate is improved but not

yet under control.  He will consult cardiology if problems persist.

 

White count was also elevated at 15,000 so he was placed on antibiotics since

he does have underlying chronic lung infection. Blood cultures were negative.

Electrolytes were normal and BNP was low at 27.

 

PHYSICAL EXAMINATION

GENERAL:  He is awake, alert, comfortable.

VITAL SIGNS:   Afebrile, blood pressure 160/80, pulse is 100 irregular, O2 sat

is 95% on 2 liters.

NECK:  Neck veins are flat.

CHEST:  Diffuse congestion and rales which are chronic.  No wheezing.

CARDIAC:  Irregular heart rhythm.  No harsh murmur.

EXTREMITIES: No peripheral edema.

 

ASSESSMENT AND PLAN

Mr. Monroe  has had new onset of dyspnea obviously complicated by his

underlying pulmonary disease, but now atrial fib with RVR seems to be the real

initiating factor here.  I spoke with Dr. Arthur today.  He is managing that

aspect of his care.  We reviewed his pulmonary therapy at the present time

including aerosolized bronchodilators, Zithromax and Azactam along with

Levaquin which should be adequate for his Pseudomonas and some IV

corticosteroids.

 

I will be away for the next week. My covering colleagues will see him as

needed.  Dr. Arthur will call them if needed. I explained to Mr. Monroe

that if he is discharged with control of the atrial fibrillation, he should go

back to the regular regimen that we had him on at home which was basically

inhaled bronchodilators at this point.  I will see him back in the office in

two weeks and he will call if problems arise prior to that.

 

                              _________________________________

                              R. MD MACEY Dickerson/

D:  9/1/2017/4:35 PM

T:  9/1/2017/8:44 PM

Visit #:  [id number]

Job #:  [id number]
0

## 2022-04-04 NOTE — ED BEHAVIORAL HEALTH ASSESSMENT NOTE - DETAILS
Did not clarify feeling "zombie like" ED aware per chart review, mother, sister, brother with mental health issues, doesn't know diagnosis Endorses passive SI, denies plan or intent. Patient refused Per chart review
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
## 2022-04-04 NOTE — ED PROVIDER NOTE - NSFOLLOWUPINSTRUCTIONS_ED_ALL_ED_FT
DISCHARGE INSTRUCTIONS:  Call your local emergency number or have someone else call (911 in the [place]) if:  You are breathing slower than normal, or you have trouble breathing.  You cannot be awakened.  You have a seizure.  Call your doctor if:  Your heart feels like it is jumping or fluttering.  You cannot think clearly.  You have side effects from prescription pain medicine, such as itching, nausea, or vomiting.  You have trouble sleeping.  Your pain gets worse, even after you take medicine.  You don't think the medicine is working.  You have questions or concerns about your condition or care.  Medicines:  You may need any of the following:    Acetaminophen decreases pain and fever. It is available without a doctor's order. Ask how much to take and how often to take it. Follow directions. Read the labels of all other medicines you are using to see if they also contain acetaminophen, or ask your doctor or pharmacist. Acetaminophen can cause liver damage if not taken correctly. Do not use more than 4 grams (4,000 milligrams) total of acetaminophen in one day.  NSAIDs , such as ibuprofen, help decrease swelling, pain, and fever. This medicine is available with or without a doctor's order. NSAIDs can cause stomach bleeding or kidney problems in certain people. If you take blood thinner medicine, always ask your healthcare provider if NSAIDs are safe for you. Always read the medicine label and follow directions.  Prescription pain medicine called narcotics or opioids may be given for certain types of chronic pain. Ask your healthcare provider how to take this medicine safely.  Anesthetics can be rubbed on your skin or injected into a nerve or muscle to numb an area.  Other medicines may reduce pain, anxiety, muscle tension, or swelling.  Take your medicine as directed. Contact your healthcare provider if you think your medicine is not helping or if you have side effects. Tell him of her if you are allergic to any medicine. Keep a list of the medicines, vitamins, and herbs you take. Include the amounts, and when and why you take them. Bring the list or the pill bottles to follow-up visits. Carry your medicine list with you in case of an emergency.    Manage your chronic pain:  Apply heat on the area in pain for 20 to 30 minutes every 2 hours for as many days as directed. Heat helps decrease pain and muscle spasms.  Apply ice on the part of your body that hurts for 15 to 20 minutes every hour or as directed. Use an ice pack, or put crushed ice in a plastic bag. Cover it with a towel. Ice decreases pain and swelling, and helps prevent tissue damage.  Go to physical therapy as directed. A physical therapist teaches you exercises to help improve movement and strength, and to decrease pain.  Exercise for 30 minutes, 3 times a week. Regular physical activity can help decrease pain and improve your quality of life. Ask your healthcare provider about the best exercise plan for your type of pain.  Get enough sleep. Create a relaxing bedtime routine. Go to sleep and wake up at the same time every day. Avoid caffeine in the afternoon.  Talk with a counselor or therapist. A type of counseling called cognitive behavioral therapy (CBT) can help your chronic pain by changing the way you think about it. CBT can also improve your mood, sleep, and ability to move.  Treatment options  The following list of medications are in some way related to or used in the treatment of this condition.    Dilaudid  OxyContin  oxycodone  Cymbalta  Buprenex  View moretreatment options    What you must know if you take narcotic pain medicine:  You may need to take a bowel movement softener. The most common side effect of prescription pain medicine is constipation. Bowel movement softeners are available over the counter.  Do not mix prescription pain medicines. This can cause an overdose of medicine, which can become life-threatening. Read labels. Make sure you know the ingredients in all of your medicines.  Do not drink alcohol when you take prescription pain medicine. It is not safe to mix narcotics or opioids with alcohol or illegal drugs.  Prescription pain medicine may impair your ability to drive or work safely. They may also cause dizziness and increase your risk for falling.  Store prescription pain medicine in a safe location at home. Keep your medicine away from children and other people. Never share your medicine with anyone.  Follow up with your healthcare provider as directed:  You may be referred to a pain specialist. Write down your questions so you remember to ask them during your visits.

## 2022-04-04 NOTE — ED BEHAVIORAL HEALTH ASSESSMENT NOTE - CASE SUMMARY
Ms Nash is a 51y old man Schizoaffective disorder, bipolar type and Alcohol Use disorder who presented to the ED for the management of pain. Psychiatry consulted for mental health evaluation because patient reports having paranoid delusions.   . It appears that patient's paranoid delusions are chronic and he has not been on psychotropic medication for a long time.  patient however does not appear to be internally preoccupied or responding to internal stimuli and seems more focused on   his pain for which he seems to want treatment. patient however appears to have difficulty organizing himself to be able to get outpatient treatment of his medical problems and outpatient psychiatry services .   For now, patient does not appear to have acute symptoms of psychosis, kevin or depression. He denies having current suicidal ideations, intent or plan.  At this time, patient is not considered an imminent danger to himself or others and does not need inpatient psychiatric hospitalization. We recommend that the ED team helps address patient's pain in the emergency room with  the plan to give him appropriate outpatient referrals to pain management. We recommend that patient should be given a referral to the outpatient psychiatry department for outpatient psychiatry and psychotherapy services .

## 2022-04-04 NOTE — ED PROVIDER NOTE - NSFOLLOWUPCLINICS_GEN_ALL_ED_FT
[facility]  OP [facility]  [address] [place] [id number]  Phone: [phone]  Fax:   Follow Up Time: 1-3 Days

## 2022-04-04 NOTE — ED PROVIDER NOTE - PHYSICAL EXAMINATION
Constitutional: Well developed, well nourished. NAD  Pt observed ambulatory in Ed and moving neck without discomfort eating B&J icecream in Ed  Head: Normocephalic, atraumatic.  Eyes: PERRL, EOMI.  ENT: No nasal discharge. Mucous membranes dry.  Neck: Supple. Painless ROM;   Cardiovascular: Normal S1, S2. Regular rate and rhythm.    Pulmonary:  Lungs clear to auscultation bilaterally.    Abdominal: Soft. Nondistended. No rebound, guarding, rigidity.  Extremities. Pelvis stable. No lower extremity edema, symmetric calves.  Skin: No rashes, cyanosis.  Neuro: AAOx3. No focal neurological deficits.  Psych: Normal mood. Normal affect.

## 2022-04-04 NOTE — ED PROVIDER NOTE - OBJECTIVE STATEMENT
51 yold male Pmhx chronic neck pain, schizoaffective disorder, Ocd, asthma c/o   visual/auditory hallucinations. Pt denying SI/HI. 51 yold male Pmhx chronic neck pain, schizoaffective disorder, Ocd, asthma c/o   visual/auditory hallucinations. Pt denying SI/HI. Pt has not been able to access his psych meds due to personal problems.

## 2022-04-04 NOTE — ED PROVIDER NOTE - CARE PLAN
1 Principal Discharge DX:	Chronic neck pain   Principal Discharge DX:	Chronic neck pain  Secondary Diagnosis:	Demanding behavior

## 2022-04-04 NOTE — ED PROVIDER NOTE - NS ED ATTENDING STATEMENT MOD
This was a shared visit with the KINZA. I reviewed and verified the documentation and independently performed the documented:

## 2022-04-04 NOTE — ED PROVIDER NOTE - PATIENT PORTAL LINK FT
You can access the FollowMyHealth Patient Portal offered by [facility] by registering at the following website: [email or web address]. By joining [employer]’s FollowMyHealth portal, you will also be able to view your health information using other applications (apps) compatible with our system.

## 2022-04-04 NOTE — ED PROVIDER NOTE - CARE PROVIDERS DIRECT ADDRESSES
[email or web address]
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
## 2022-04-04 NOTE — ED PROVIDER NOTE - CARE PROVIDER_API CALL
Chintan Singh (MD)  Anesthesiology; Pain Medicine  [address] [place] [address]  Phone: [phone]  Fax: [phone]  Follow Up Time: 1-3 Days

## 2022-04-04 NOTE — ED ADULT NURSE REASSESSMENT NOTE - NS ED NURSE REASSESS COMMENT FT1
Pt resting in bed comfortably at this time. Pt endorses neck pain, states he has a "fractured neck." Pt denies SI/HI.
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
## 2022-04-05 ENCOUNTER — INPATIENT (INPATIENT)
Facility: HOSPITAL | Age: 52
LOS: 2 days | Discharge: HOME | End: 2022-04-08
Attending: PSYCHIATRY & NEUROLOGY | Admitting: PSYCHIATRY & NEUROLOGY
Payer: MEDICAID

## 2022-04-05 VITALS
DIASTOLIC BLOOD PRESSURE: 62 MMHG | SYSTOLIC BLOOD PRESSURE: 112 MMHG | HEIGHT: 68 IN | RESPIRATION RATE: 20 BRPM | TEMPERATURE: 98 F | WEIGHT: 164.91 LBS | HEART RATE: 74 BPM | OXYGEN SATURATION: 100 %

## 2022-04-05 DIAGNOSIS — Z98.890 OTHER SPECIFIED POSTPROCEDURAL STATES: Chronic | ICD-10-CM

## 2022-04-05 DIAGNOSIS — F25.9 SCHIZOAFFECTIVE DISORDER, UNSPECIFIED: ICD-10-CM

## 2022-04-05 LAB
ALBUMIN SERPL ELPH-MCNC: 3.6 G/DL — SIGNIFICANT CHANGE UP (ref 3.5–5.2)
ALP SERPL-CCNC: 99 U/L — SIGNIFICANT CHANGE UP (ref 30–115)
ALT FLD-CCNC: 14 U/L — SIGNIFICANT CHANGE UP (ref 0–41)
ANION GAP SERPL CALC-SCNC: 8 MMOL/L — SIGNIFICANT CHANGE UP (ref 7–14)
AST SERPL-CCNC: 23 U/L — SIGNIFICANT CHANGE UP (ref 0–41)
BASOPHILS # BLD AUTO: 0.05 K/UL — SIGNIFICANT CHANGE UP (ref 0–0.2)
BASOPHILS NFR BLD AUTO: 0.7 % — SIGNIFICANT CHANGE UP (ref 0–1)
BILIRUB SERPL-MCNC: 0.2 MG/DL — SIGNIFICANT CHANGE UP (ref 0.2–1.2)
BUN SERPL-MCNC: 21 MG/DL — HIGH (ref 10–20)
CALCIUM SERPL-MCNC: 9.2 MG/DL — SIGNIFICANT CHANGE UP (ref 8.5–10.1)
CHLORIDE SERPL-SCNC: 102 MMOL/L — SIGNIFICANT CHANGE UP (ref 98–110)
CO2 SERPL-SCNC: 30 MMOL/L — SIGNIFICANT CHANGE UP (ref 17–32)
CREAT SERPL-MCNC: 1.2 MG/DL — SIGNIFICANT CHANGE UP (ref 0.7–1.5)
EGFR: 72 ML/MIN/1.73M2 — SIGNIFICANT CHANGE UP
EOSINOPHIL # BLD AUTO: 0.32 K/UL — SIGNIFICANT CHANGE UP (ref 0–0.7)
EOSINOPHIL NFR BLD AUTO: 4.7 % — SIGNIFICANT CHANGE UP (ref 0–8)
GLUCOSE SERPL-MCNC: 83 MG/DL — SIGNIFICANT CHANGE UP (ref 70–99)
HCT VFR BLD CALC: 41.1 % — LOW (ref 42–52)
HGB BLD-MCNC: 14.3 G/DL — SIGNIFICANT CHANGE UP (ref 14–18)
IMM GRANULOCYTES NFR BLD AUTO: 0.3 % — SIGNIFICANT CHANGE UP (ref 0.1–0.3)
LYMPHOCYTES # BLD AUTO: 1.59 K/UL — SIGNIFICANT CHANGE UP (ref 1.2–3.4)
LYMPHOCYTES # BLD AUTO: 23.4 % — SIGNIFICANT CHANGE UP (ref 20.5–51.1)
MAGNESIUM SERPL-MCNC: 1.9 MG/DL — SIGNIFICANT CHANGE UP (ref 1.8–2.4)
MCHC RBC-ENTMCNC: 31.8 PG — HIGH (ref 27–31)
MCHC RBC-ENTMCNC: 34.8 G/DL — SIGNIFICANT CHANGE UP (ref 32–37)
MCV RBC AUTO: 91.5 FL — SIGNIFICANT CHANGE UP (ref 80–94)
MONOCYTES # BLD AUTO: 0.63 K/UL — HIGH (ref 0.1–0.6)
MONOCYTES NFR BLD AUTO: 9.3 % — SIGNIFICANT CHANGE UP (ref 1.7–9.3)
NEUTROPHILS # BLD AUTO: 4.18 K/UL — SIGNIFICANT CHANGE UP (ref 1.4–6.5)
NEUTROPHILS NFR BLD AUTO: 61.6 % — SIGNIFICANT CHANGE UP (ref 42.2–75.2)
NRBC # BLD: 0 /100 WBCS — SIGNIFICANT CHANGE UP (ref 0–0)
PLATELET # BLD AUTO: 210 K/UL — SIGNIFICANT CHANGE UP (ref 130–400)
POTASSIUM SERPL-MCNC: 4.2 MMOL/L — SIGNIFICANT CHANGE UP (ref 3.5–5)
POTASSIUM SERPL-SCNC: 4.2 MMOL/L — SIGNIFICANT CHANGE UP (ref 3.5–5)
PROT SERPL-MCNC: 5.9 G/DL — LOW (ref 6–8)
RBC # BLD: 4.49 M/UL — LOW (ref 4.7–6.1)
RBC # FLD: 13.4 % — SIGNIFICANT CHANGE UP (ref 11.5–14.5)
SARS-COV-2 RNA SPEC QL NAA+PROBE: SIGNIFICANT CHANGE UP
SODIUM SERPL-SCNC: 140 MMOL/L — SIGNIFICANT CHANGE UP (ref 135–146)
WBC # BLD: 6.79 K/UL — SIGNIFICANT CHANGE UP (ref 4.8–10.8)
WBC # FLD AUTO: 6.79 K/UL — SIGNIFICANT CHANGE UP (ref 4.8–10.8)

## 2022-04-05 PROCEDURE — 99285 EMERGENCY DEPT VISIT HI MDM: CPT

## 2022-04-05 PROCEDURE — 93010 ELECTROCARDIOGRAM REPORT: CPT

## 2022-04-05 PROCEDURE — 90792 PSYCH DIAG EVAL W/MED SRVCS: CPT | Mod: GC

## 2022-04-05 RX ORDER — ALBUTEROL 90 UG/1
2 AEROSOL, METERED ORAL EVERY 6 HOURS
Refills: 0 | Status: DISCONTINUED | OUTPATIENT
Start: 2022-04-05 | End: 2022-04-08

## 2022-04-05 RX ORDER — HALOPERIDOL DECANOATE 100 MG/ML
5 INJECTION INTRAMUSCULAR EVERY 6 HOURS
Refills: 0 | Status: DISCONTINUED | OUTPATIENT
Start: 2022-04-06 | End: 2022-04-08

## 2022-04-05 RX ORDER — HYDROXYZINE HCL 10 MG
50 TABLET ORAL EVERY 6 HOURS
Refills: 0 | Status: DISCONTINUED | OUTPATIENT
Start: 2022-04-06 | End: 2022-04-06

## 2022-04-05 RX ORDER — NICOTINE POLACRILEX 2 MG
1 GUM BUCCAL DAILY
Refills: 0 | Status: DISCONTINUED | OUTPATIENT
Start: 2022-04-05 | End: 2022-04-08

## 2022-04-05 RX ORDER — KETOROLAC TROMETHAMINE 30 MG/ML
30 SYRINGE (ML) INJECTION ONCE
Refills: 0 | Status: DISCONTINUED | OUTPATIENT
Start: 2022-04-05 | End: 2022-04-05

## 2022-04-05 RX ORDER — METHOCARBAMOL 500 MG/1
500 TABLET, FILM COATED ORAL EVERY 6 HOURS
Refills: 0 | Status: DISCONTINUED | OUTPATIENT
Start: 2022-04-05 | End: 2022-04-06

## 2022-04-05 RX ADMIN — METHOCARBAMOL 500 MILLIGRAM(S): 500 TABLET, FILM COATED ORAL at 20:29

## 2022-04-05 RX ADMIN — Medication 30 MILLIGRAM(S): at 14:28

## 2022-04-05 RX ADMIN — Medication 1 PATCH: at 20:30
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
## 2022-04-05 NOTE — ED BEHAVIORAL HEALTH ASSESSMENT NOTE - NS ED BHA AXIS I PRIMARY CODE FT
[facility]: Post-Discharge Note  SITUATION                                                      Admission:    Admission Date: 06/26/19   Reason for Admission: Diabetes (newly diagnosed), uncertain if type 1 or type 2  Discharge:   Discharge Date: 06/29/19  Discharge Diagnosis: Diabetes (newly diagnosed), uncertain if type 1 or type 2  Discharge Service: Hospitalist     BACKGROUND                                                      A pleasant, generally healthy 36-year-old male admitted to the medical service through the emergency department where he presented with constellation of urinary frequency, increased thirst and fatigue with documented marked hyperglycemia with blood sugars in excess of 600, consistent with newly-diagnosed diabetes mellitus.      The patient was in his usual state of health until approximately 1 month prior to admission, when the above symptoms began.  No correlating infectious symptoms.  No new meds.  No prior knowledge regarding diabetes.  No family history of diabetes.  ER evaluation demonstrated a blood glucose of 684, improving to 385 after 2 liters of normal saline.     ASSESSMENT      Discharge Assessment  Patient reports symptoms are: Improved  Does the patient have all of their medications?: Yes  Does patient know what their new medications are for?: Yes  Does patient have a follow-up appointment scheduled?: No  Does patient have any other questions or concerns?: No    Post-op  Did the patient have surgery or a procedure: No  Fever: No  Chills: No  Eating & Drinking: eating and drinking without complaints/concerns  PO Intake: other(Diabetic diet)  Bowel Function: normal  Urinary Status: voiding without complaint/concerns    PLAN                                                      Outpatient Plan:      The patient follow up with his primary care provider within the week.  Followup lab data to determine if patient is a type 1 or type 2 diabetic.  Endocrine  followup recommended if determined to be type 1.     No future appointments.        Kathy Montes De Oca, CMA                   F25.9
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
## 2022-04-05 NOTE — ED BEHAVIORAL HEALTH ASSESSMENT NOTE - SUICIDAL IDEATION DETAILS
Schizoaffective d/o not currently treated w/ reports of paranoia, AH, SI and HI. Patient with affective instability and poor insight in to mental health needs. Social factors, including homelessness, likely worsening patient's ability to care for self.

## 2022-04-05 NOTE — ED PROVIDER NOTE - ATTENDING CONTRIBUTION TO CARE
patient presents with psychiatric complaint of non specific si/hi 2/2 to auditory hallucinations that are intermittent over past 2-3 weeks after purposely stopping his medications including seroquel. yesterday he did not have si/hi despite being evaluated by psych. on exam patient is slightly agitated but redirectable. awake and alert. follows commands, no pressure speech. plan is to have psychiatric evaluation.

## 2022-04-05 NOTE — ED BEHAVIORAL HEALTH ASSESSMENT NOTE - DETAILS
per chart review, mother, sister, brother with mental health issues, doesn't know diagnosis, per chart review Per chart review feeling "zombie like" Endorsed SI and HI at triage but did not cooperate with writer for full assessment. Did not clarify ED providers aware to be provided

## 2022-04-05 NOTE — ED PROVIDER NOTE - PHYSICAL EXAMINATION
CONSTITUTIONAL: disheveled elderly,   SKIN: surgical scar over neck  HEAD: Normocephalic; atraumatic.  EYES: PERRL, 3 mm bilateral, no nystagmus, EOM intact; conjunctiva and sclera clear.  ENT: MMM, no nasal congestion  NECK: Supple; non tender.  ROM intact.  CARD: S1, S2 normal, no murmur  RESP: No wheezes, rales or rhonchi. Good air movement bilaterally  ABD: soft; non-distended; non-tender. No Rebound, No guarding  EXT: Normal ROM. No cyanosis or edema. Dp Pulses intact.   NEURO: awake, alert, following commands, oriented, grossly unremarkable. No Focal deficits. GCS 15.   PSYCH: slight agitation

## 2022-04-05 NOTE — ED BEHAVIORAL HEALTH ASSESSMENT NOTE - DESCRIPTION
Patient arrived in ED and originally seated in chair, was moved to bed following complaints of pain, no pain management medications requested. Patient on 1:1. lives with sister, unemployed chronic neck pain, mandibular fracture, asthma, copd

## 2022-04-05 NOTE — ED PROVIDER NOTE - CARE PLAN
1 Principal Discharge DX:	Suicidal ideation  Secondary Diagnosis:	Schizophrenia  Secondary Diagnosis:	Homicidal ideation

## 2022-04-05 NOTE — ED BEHAVIORAL HEALTH ASSESSMENT NOTE - OTHER
undomiciled vs w/ sister Patient did not cooperate with writer; reported SI and HI at time of triage. deferred

## 2022-04-05 NOTE — ED ADULT TRIAGE NOTE - CHIEF COMPLAINT QUOTE
pt c/o hallucinations, states he is schizophrenic off meds and needs to see psych pt c/o hallucinations, states he is schizophrenic off meds and needs to see psych  1:1 initiated in triage

## 2022-04-05 NOTE — ED PROVIDER NOTE - OBJECTIVE STATEMENT
51 yold male to Ed Pmhx chronic neck pain, schizoaffective disorder, Ocd, asthma  has cc of pyschiatric eval. pt states that he has not taken his seroquel for over 2 months. Pt admits to SI and HI but has no plans currently. pt also admits to visual and auditory hallucinations.
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
## 2022-04-05 NOTE — H&P ADULT - NSHPPHYSICALEXAM_GEN_ALL_CORE
VITALS:   ICU Vital Signs Last 24 Hrs  T(C): 36.1 (05 Apr 2022 07:33), Max: 36.7 (05 Apr 2022 06:34)  T(F): 96.9 (05 Apr 2022 07:33), Max: 98 (05 Apr 2022 06:34)  HR: 73 (05 Apr 2022 07:33) (73 - 74)  BP: 104/61 (05 Apr 2022 07:33) (104/61 - 112/62)  RR: 18 (05 Apr 2022 07:33) (18 - 20)  SpO2: 98% (05 Apr 2022 07:33) (98% - 100%)        GENERAL: NAD, lying in bed comfortably  HEAD:  left eye brow healing laceration, no sutures in place  EYES: EOMI, PERRLA, conjunctiva and sclera clear, no horizontal nystagmus  ENT: Moist mucous membranes  NECK: Supple, No JVD, old surgical scar, mild tenderness to palpation,   CHEST/LUNG:  No rales, rhonchi, wheezing, or rubs. Unlabored respirations  HEART: Regular rate and rhythm; No murmurs, rubs, or gallops  ABDOMEN: Soft, Nontender, Nondistended. No hepatomegally  EXTREMITIES:  no edema or tenderness   NERVOUS SYSTEM:  Alert & Oriented X3, speech clear. No deficits   MSK: FROM all 4 extremities, full and equal strength  SKIN: No rashes or lesions

## 2022-04-05 NOTE — ED PROVIDER NOTE - NS ED ROS FT
Constitutional:  no fevers, no chills, no malaise  Eyes:  No visual changes  ENMT: No neck pain or stiffness, no nasal congestion, no ear pain, no throat pain  Cardiac:  No chest pain  Respiratory:  No cough or sob  GI:  No nausea, vomiting, diarrhea or abdominal pain.  :  No dysuria, frequency or burning.  MS:  +neck pain  Neuro:  No headache, no dizziness, no change in mental status  Skin:  No skin rash  Except as documented in the HPI,  all other systems are negative

## 2022-04-05 NOTE — ED ADULT NURSE NOTE - OBJECTIVE STATEMENT
Pt c/o hallucinations stating he is having bad thoughts and not taking his meds Pt c/o hallucinations stating he is having bad thoughts and not taking his meds. 1:1 initiated in triage , belongings given to security
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
## 2022-04-05 NOTE — ED BEHAVIORAL HEALTH ASSESSMENT NOTE - CASE SUMMARY
Mr Nash is a 51y old man Schizoaffective disorder, bipolar type and Alcohol Use disorder who presented to the ED for the management of pain. Psychiatry consulted for mental health evaluation because patient reports having paranoid delusions.   Although patient's paranoid delusions are considered chronic  , he has  been noncompliant with medications , has been to the ED recurrently complaining of paranoia and  auditory hallucinations . He also appears to be somewhat labile in mood and unable to take care of himself.   patient however does not appear to be internally preoccupied or responding to internal stimuli and seems more focused on   his pain for which he seems to want treatment. patient however appears to have difficulty organizing himself to be able to get outpatient treatment of his medical problems and outpatient psychiatry services .   For now patient denies symptoms of , kevin or depression. He denies having current suicidal ideations, intent or plan.  At this time, patient is considered an imminent danger to himself and  others and needs inpatient psychiatric hospitalization. We recommend starting patient on haldol , Ativan and benadryl for the management of agitation on an as needed basis. Patient will discuss psychotropic medication choice with the psychiatrist that will be taking care of him .

## 2022-04-05 NOTE — ED ADULT NURSE NOTE - CHIEF COMPLAINT QUOTE
pt c/o hallucinations, states he is schizophrenic off meds and needs to see psych  1:1 initiated in triage

## 2022-04-05 NOTE — ED BEHAVIORAL HEALTH ASSESSMENT NOTE - RISK ASSESSMENT
risk factors - feelings of paranoia, auditory hallucinations (not distressing), substances use, impulsivity, hx of legal issues including incarceration, medication non-adherent, not linked to care, uncontrolled pain  protective factors - denies SI/HI Low Acute Suicide Risk

## 2022-04-06 DIAGNOSIS — F10.20 ALCOHOL DEPENDENCE, UNCOMPLICATED: ICD-10-CM

## 2022-04-06 DIAGNOSIS — F14.10 COCAINE ABUSE, UNCOMPLICATED: ICD-10-CM

## 2022-04-06 DIAGNOSIS — F25.0 SCHIZOAFFECTIVE DISORDER, BIPOLAR TYPE: ICD-10-CM

## 2022-04-06 LAB
A1C WITH ESTIMATED AVERAGE GLUCOSE RESULT: 5 % — SIGNIFICANT CHANGE UP (ref 4–5.6)
AMPHET UR-MCNC: NEGATIVE — SIGNIFICANT CHANGE UP
APPEARANCE UR: CLEAR — SIGNIFICANT CHANGE UP
BARBITURATES UR SCN-MCNC: NEGATIVE — SIGNIFICANT CHANGE UP
BENZODIAZ UR-MCNC: NEGATIVE — SIGNIFICANT CHANGE UP
BILIRUB UR-MCNC: NEGATIVE — SIGNIFICANT CHANGE UP
CHOLEST SERPL-MCNC: 159 MG/DL — SIGNIFICANT CHANGE UP
COCAINE METAB.OTHER UR-MCNC: POSITIVE
COLOR SPEC: YELLOW — SIGNIFICANT CHANGE UP
DIFF PNL FLD: NEGATIVE — SIGNIFICANT CHANGE UP
DRUG SCREEN 1, URINE RESULT: SIGNIFICANT CHANGE UP
ESTIMATED AVERAGE GLUCOSE: 97 MG/DL — SIGNIFICANT CHANGE UP (ref 68–114)
FENTANYL UR QL: NEGATIVE — SIGNIFICANT CHANGE UP
GLUCOSE UR QL: NEGATIVE MG/DL — SIGNIFICANT CHANGE UP
HDLC SERPL-MCNC: 67 MG/DL — SIGNIFICANT CHANGE UP
KETONES UR-MCNC: NEGATIVE — SIGNIFICANT CHANGE UP
LEUKOCYTE ESTERASE UR-ACNC: NEGATIVE — SIGNIFICANT CHANGE UP
LIPID PNL WITH DIRECT LDL SERPL: 85 MG/DL — SIGNIFICANT CHANGE UP
METHADONE UR-MCNC: NEGATIVE — SIGNIFICANT CHANGE UP
NITRITE UR-MCNC: NEGATIVE — SIGNIFICANT CHANGE UP
NON HDL CHOLESTEROL: 92 MG/DL — SIGNIFICANT CHANGE UP
OPIATES UR-MCNC: NEGATIVE — SIGNIFICANT CHANGE UP
OXYCODONE UR-MCNC: NEGATIVE — SIGNIFICANT CHANGE UP
PCP UR-MCNC: NEGATIVE — SIGNIFICANT CHANGE UP
PH UR: 7 — SIGNIFICANT CHANGE UP (ref 5–8)
PROPOXYPHENE QUALITATIVE URINE RESULT: NEGATIVE — SIGNIFICANT CHANGE UP
PROT UR-MCNC: NEGATIVE MG/DL — SIGNIFICANT CHANGE UP
SP GR SPEC: 1.02 — SIGNIFICANT CHANGE UP (ref 1.01–1.03)
THC UR QL: NEGATIVE — SIGNIFICANT CHANGE UP
TRIGL SERPL-MCNC: 86 MG/DL — SIGNIFICANT CHANGE UP
TSH SERPL-MCNC: 0.92 UIU/ML — SIGNIFICANT CHANGE UP (ref 0.27–4.2)
UROBILINOGEN FLD QL: 0.2 MG/DL — SIGNIFICANT CHANGE UP

## 2022-04-06 PROCEDURE — 99232 SBSQ HOSP IP/OBS MODERATE 35: CPT

## 2022-04-06 RX ORDER — HYDROXYZINE HCL 10 MG
50 TABLET ORAL EVERY 6 HOURS
Refills: 0 | Status: DISCONTINUED | OUTPATIENT
Start: 2022-04-06 | End: 2022-04-08

## 2022-04-06 RX ORDER — RISPERIDONE 4 MG/1
1 TABLET ORAL
Refills: 0 | Status: DISCONTINUED | OUTPATIENT
Start: 2022-04-06 | End: 2022-04-08

## 2022-04-06 RX ORDER — KETOROLAC TROMETHAMINE 30 MG/ML
30 SYRINGE (ML) INJECTION ONCE
Refills: 0 | Status: DISCONTINUED | OUTPATIENT
Start: 2022-04-06 | End: 2022-04-06

## 2022-04-06 RX ORDER — GABAPENTIN 400 MG/1
300 CAPSULE ORAL THREE TIMES A DAY
Refills: 0 | Status: DISCONTINUED | OUTPATIENT
Start: 2022-04-06 | End: 2022-04-08

## 2022-04-06 RX ADMIN — Medication 1 PATCH: at 07:55

## 2022-04-06 RX ADMIN — GABAPENTIN 300 MILLIGRAM(S): 400 CAPSULE ORAL at 20:10

## 2022-04-06 RX ADMIN — RISPERIDONE 1 MILLIGRAM(S): 4 TABLET ORAL at 20:10

## 2022-04-06 RX ADMIN — Medication 1 PATCH: at 08:13

## 2022-04-06 RX ADMIN — Medication 30 MILLIGRAM(S): at 04:00

## 2022-04-06 RX ADMIN — ALBUTEROL 2 PUFF(S): 90 AEROSOL, METERED ORAL at 17:45

## 2022-04-06 RX ADMIN — Medication 1 PATCH: at 20:14

## 2022-04-06 RX ADMIN — Medication 30 MILLIGRAM(S): at 03:06

## 2022-04-06 RX ADMIN — Medication 50 MILLIGRAM(S): at 20:10

## 2022-04-06 RX ADMIN — RISPERIDONE 1 MILLIGRAM(S): 4 TABLET ORAL at 10:52

## 2022-04-06 RX ADMIN — METHOCARBAMOL 500 MILLIGRAM(S): 500 TABLET, FILM COATED ORAL at 03:06

## 2022-04-06 RX ADMIN — GABAPENTIN 300 MILLIGRAM(S): 400 CAPSULE ORAL at 17:18

## 2022-04-06 RX ADMIN — Medication 1 PATCH: at 18:37
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
## 2022-04-06 NOTE — PROGRESS NOTE BEHAVIORAL HEALTH - NSBHCHARTREVIEWIMAGING_PSY_A_CORE FT
< from: CT Maxillofacial No Cont (03.02.22 @ 08:12) >    IMPRESSION:    CT HEAD:  No acute intracranial injury.    CTFACIAL BONES:  Status post ORIF with fixation plate and screws in the anterior right   mandible.    Nondisplaced linear lucency in the subjacent anterior right mandible   extending from the buccal to the lingual cortices, likely representing an   acute fracture. The fracture line traverses the inferior alveolar canal.    Stable chronic deformity of the nasal bone with rightward deviation.    CT CERVICAL SPINE:  Status post C3-4 ACDF and posterior fusion with laminectomy. No CT   evidence of hardware complications.    No acute cervical fracture or facet subluxation.    < end of copied text >      < from: Xray Pelvis AP only (03.02.22 @ 09:10) >    FINDINGS/  IMPRESSION:    Right acetabular surgical hardware.    No definite fracture or dislocation. Degenerative changes of right hip   with heterotopic ossification.    < end of copied text >    < from: Xray Chest 1 View- PORTABLE-Urgent (Xray Chest 1 View- PORTABLE-Urgent .) (03.02.22 @ 09:09) >    Impression:    No radiographic evidence of acute cardiopulmonary disease.    < end of copied text >
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
## 2022-04-06 NOTE — PROGRESS NOTE BEHAVIORAL HEALTH - NSBHCHARTREVIEWVS_PSY_A_CORE FT
Vital Signs Last 24 Hrs  T(C): 36.2 (06 Apr 2022 09:05), Max: 36.2 (06 Apr 2022 09:05)  T(F): 97.1 (06 Apr 2022 09:05), Max: 97.1 (06 Apr 2022 09:05)  HR: 57 (06 Apr 2022 09:05) (57 - 62)  BP: 119/66 (06 Apr 2022 09:05) (90/61 - 119/66)  BP(mean): --  RR: 18 (06 Apr 2022 09:05) (18 - 18)  SpO2: --

## 2022-04-06 NOTE — PROGRESS NOTE BEHAVIORAL HEALTH - NSBHADDHXSUBSTFT_PSY_A_CORE
Pt reports daily alcohol use, 6 packs of beer per day, last use PTA. Reports h/o cocaine use, reports last use 1 month ago, unable to quantify. Reports cigarettes use, 1PPD. He denies cannabis use, denies use of other illicit substances.
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
## 2022-04-06 NOTE — PROGRESS NOTE BEHAVIORAL HEALTH - NSBHADDHXFAMFT_PSY_A_CORE
As per chart review, "mother, sister, brother with mental health issues, doesn't know diagnosis" As per chart review, "Father had history of alcohol use disorder. Mother and siblings have psychiatric history but no SA."
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
## 2022-04-06 NOTE — PROGRESS NOTE BEHAVIORAL HEALTH - NSBHADMITMEDEDUDETAILS_A_CORE FT
Educated on risks and benefits of risperdal, and side effects profile. Pt verbalize understanding. Educated on risks and benefits of risperdal, and side effects profile. Pt verbalize understanding.  Educated on risks and benefits of gabapentin, and side effects profile. Pt verbalize understanding.

## 2022-04-07 PROCEDURE — 99231 SBSQ HOSP IP/OBS SF/LOW 25: CPT

## 2022-04-07 RX ORDER — KETOROLAC TROMETHAMINE 30 MG/ML
30 SYRINGE (ML) INJECTION ONCE
Refills: 0 | Status: DISCONTINUED | OUTPATIENT
Start: 2022-04-07 | End: 2022-04-08

## 2022-04-07 RX ORDER — KETOROLAC TROMETHAMINE 30 MG/ML
30 SYRINGE (ML) INJECTION ONCE
Refills: 0 | Status: DISCONTINUED | OUTPATIENT
Start: 2022-04-07 | End: 2022-04-07

## 2022-04-07 RX ADMIN — GABAPENTIN 300 MILLIGRAM(S): 400 CAPSULE ORAL at 20:35

## 2022-04-07 RX ADMIN — RISPERIDONE 1 MILLIGRAM(S): 4 TABLET ORAL at 08:33

## 2022-04-07 RX ADMIN — GABAPENTIN 300 MILLIGRAM(S): 400 CAPSULE ORAL at 13:09

## 2022-04-07 RX ADMIN — GABAPENTIN 300 MILLIGRAM(S): 400 CAPSULE ORAL at 08:34

## 2022-04-07 RX ADMIN — Medication 1 PATCH: at 08:34

## 2022-04-07 RX ADMIN — Medication 50 MILLIGRAM(S): at 23:55

## 2022-04-07 RX ADMIN — RISPERIDONE 1 MILLIGRAM(S): 4 TABLET ORAL at 20:35

## 2022-04-07 NOTE — PROGRESS NOTE BEHAVIORAL HEALTH - NSBHFUPINTERVALHXFT_PSY_A_CORE
Pt seen and evaluated, chart reviewed. As per nursing report, pt agitated at times d/t wants not being met right away, verbally redirectable, no PRNs. On evaluation, Pt seen and evaluated, chart reviewed. As per nursing report, pt agitated at times d/t wants not being met right away, verbally redirectable, no PRNs. On evaluation, pt presents in bed in deep sleep, not awakening despite several attempts to engage. Later in afternoon, pt presents eating lunch in dining room in no apparent distress. Pt seen and evaluated, chart reviewed. As per nursing report, pt agitated at times d/t wants not being met right away, verbally redirectable, no PRNs, refused AM VS. On evaluation, pt presents in bed in deep sleep, not awakening despite several attempts to engage. Later in afternoon, pt presents eating lunch in dining room in no apparent distress. On evaluation, pt presents linear and angry, in bed with covers over his head. He repeatedly states that he is not getting help because he remains with neck/jaw pain. Several attempts needed to redirect pt to discuss psychiatric complaints. He reports he is unable "to get his mind right" d/t neck/jaw pain. He remains vague regarding previously disclosed AH and paranoia, states he does not want to discuss with this writer, states "I need help with the pain". He adamantly denies thoughts of SI/HI. Pt presents goal-oriented. Pt adherent to medications, denies negative side effects. Pt seen and evaluated, chart reviewed. As per nursing report, pt agitated at times d/t wants not being met right away, verbally redirectable, no PRNs, refused AM VS. On evaluation, pt presents in bed in deep sleep, not awakening despite several attempts to engage. Later in afternoon, pt presents eating lunch in dining room in no apparent distress. On evaluation, pt presents linear and angry, in bed with covers over his head. He repeatedly states that he is not getting help because he remains with neck/jaw pain. Several attempts needed to redirect pt to discuss psychiatric complaints. He reports he is unable "to get his mind right" d/t neck/jaw pain. He remains vague regarding previously disclosed AH and paranoia, states he does not want to discuss with this writer, states "I need help with the pain". He adamantly denies thoughts of SI/HI. Pt presents goal-oriented. Pt adherent to medications, denies negative side effects. UDS +cocaine.
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
## 2022-04-07 NOTE — PROGRESS NOTE BEHAVIORAL HEALTH - NSBHCHARTREVIEWVS_PSY_A_CORE FT
Vital Signs Last 24 Hrs  T(C): 36 (06 Apr 2022 15:52), Max: 36 (06 Apr 2022 15:52)  T(F): 96.8 (06 Apr 2022 15:52), Max: 96.8 (06 Apr 2022 15:52)  HR: 67 (06 Apr 2022 15:52) (67 - 67)  BP: 113/78 (06 Apr 2022 15:52) (113/78 - 113/78)  BP(mean): --  RR: 16 (06 Apr 2022 15:52) (16 - 16)  SpO2: --

## 2022-04-07 NOTE — PROGRESS NOTE BEHAVIORAL HEALTH - NSBHADMITMEDEDUDETAILS_A_CORE FT
Educated on risks and benefits of risperdal, and side effects profile. Pt verbalize understanding.  Educated on risks and benefits of gabapentin, and side effects profile. Pt verbalize understanding.

## 2022-04-08 VITALS — DIASTOLIC BLOOD PRESSURE: 59 MMHG | SYSTOLIC BLOOD PRESSURE: 110 MMHG

## 2022-04-08 PROCEDURE — 99238 HOSP IP/OBS DSCHRG MGMT 30/<: CPT

## 2022-04-08 RX ORDER — RISPERIDONE 4 MG/1
1 TABLET ORAL
Qty: 28 | Refills: 0
Start: 2022-04-08 | End: 2022-04-21

## 2022-04-08 RX ORDER — ALBUTEROL 90 UG/1
2 AEROSOL, METERED ORAL
Qty: 1 | Refills: 0
Start: 2022-04-08 | End: 2022-04-21

## 2022-04-08 RX ORDER — GABAPENTIN 400 MG/1
1 CAPSULE ORAL
Qty: 42 | Refills: 0
Start: 2022-04-08 | End: 2022-04-21

## 2022-04-08 RX ORDER — NICOTINE POLACRILEX 2 MG
1 GUM BUCCAL
Qty: 14 | Refills: 0
Start: 2022-04-08 | End: 2022-04-21

## 2022-04-08 RX ADMIN — GABAPENTIN 300 MILLIGRAM(S): 400 CAPSULE ORAL at 11:30

## 2022-04-08 RX ADMIN — Medication 1 PATCH: at 08:33

## 2022-04-08 RX ADMIN — Medication 1 PATCH: at 10:58

## 2022-04-08 RX ADMIN — RISPERIDONE 1 MILLIGRAM(S): 4 TABLET ORAL at 11:30

## 2022-04-08 RX ADMIN — Medication 1 PATCH: at 08:32

## 2022-04-08 NOTE — PROGRESS NOTE BEHAVIORAL HEALTH - NSBHCHARTREVIEWVS_PSY_A_CORE FT
Vital Signs Last 24 Hrs  T(C): 36.3 (08 Apr 2022 08:22), Max: 36.3 (08 Apr 2022 08:22)  T(F): 97.3 (08 Apr 2022 08:22), Max: 97.3 (08 Apr 2022 08:22)  HR: 64 (08 Apr 2022 08:22) (61 - 106)  BP: 83/47 (08 Apr 2022 08:22) (83/47 - 95/54)  BP(mean): --  RR: 16 (08 Apr 2022 08:22) (16 - 16)  SpO2: -- Vital Signs Last 24 Hrs  T(C): 36.3 (08 Apr 2022 08:22), Max: 36.3 (08 Apr 2022 08:22)  T(F): 97.3 (08 Apr 2022 08:22), Max: 97.3 (08 Apr 2022 08:22)  HR: 64 (08 Apr 2022 08:22) (61 - 106)  BP: 110/59 (08 Apr 2022 11:11) (83/47 - 110/59)  BP(mean): --  RR: 16 (08 Apr 2022 08:22) (16 - 16)  SpO2: --

## 2022-04-08 NOTE — PROGRESS NOTE BEHAVIORAL HEALTH - BEHAVIOR
Dee Dee called requesting a script for oxygen face mask to be faxed to [employer]. Please call her when faxed to [phone] and is ok to leave a message.   Cooperative

## 2022-04-08 NOTE — CHART NOTE - NSCHARTNOTEFT_GEN_A_CORE
Social Work Discharge Note:    Patient is for discharge today.   He is alert and oriented x3.  Mood is improved.  Anxiety has decreased.  Insight and judgment have improved.  Suicidal / homicidal ideation denied.      Patient will be discharged to his apartment on [address].  Plan is for referral to [facility].  He is aware and agreeable to same.      Discharge huddle was held prior to discharge to discuss discharge plan and referral for continued mental health treatment in outpatient setting.  No safety concerns were verbalized at that time.      Patient is aware and agreeable to discharge today.          Pt states he rents an apt in  but will not provide any way for writer to confirm. As per [facility] Pt has active [facility] Cares ID. Pt refuses to consent to shelter application restating he has an apartment. Writer provided Pt with information for [facility] intake facility as well as [facility].    Pt states he has a phone but will not provide writer with number. Chart review reveals phone number . Patient states he wants to get out of here because no one is doing anything for him. Patient states he said what he had to in order to gain admission to unit.

## 2022-04-08 NOTE — DISCHARGE NOTE BEHAVIORAL HEALTH - NSBHDCDXVALIDYESFT_PSY_A_CORE
Schizoaffective d/o, bipolar type; Cocaine use d/o  Pt with rapid improvement of psychosis and minimization of substance abuse, likely high component of SIPD. During admission, pt adamantly denied recent and current SI/HI/I/P, appeared future-oriented and goal-directed, perseverating on pain management/treatment Schizoaffective d/o, bipolar type; Cocaine use d/o  Pt with rapid improvement of psychosis and minimization of substance abuse (etoh, cocaine), likely high component of SIPD. During admission, pt adamantly denied recent and current SI/HI/I/P, appeared future-oriented and goal-directed, perseverating on pain management/treatment Schizoaffective d/o, bipolar type; AUD, Cocaine use d/o  Pt with rapid improvement of psychosis and minimization of substance abuse (etoh, cocaine), likely high component of SIPD. During admission, pt adamantly denied recent and current SI/HI/I/P, appeared future-oriented and goal-directed, perseverating on pain management/treatment
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
## 2022-04-08 NOTE — DISCHARGE NOTE BEHAVIORAL HEALTH - MEDICATION SUMMARY - MEDICATIONS TO STOP TAKING
I will STOP taking the medications listed below when I get home from the hospital:    tiZANidine 4 mg oral capsule  -- 1 cap(s) by mouth 3 times a day   -- It is very important that you take or use this exactly as directed.  Do not skip doses or discontinue unless directed by your doctor.  May cause drowsiness.  Alcohol may intensify this effect.  Use care when operating dangerous machinery.  This drug may impair the ability to drive or operate machinery.  Use care until you become familiar with its effects.

## 2022-04-08 NOTE — DISCHARGE NOTE BEHAVIORAL HEALTH - NSBHDCSUICFCTRMIT_PSY_A_CORE
Patient has been medication compliant, not endorsing any side effects. Patient is future-oriented and goal-directed, reports goal to f/u OP. Patient verbalizing reasons for living. Patient with improved insight into events that led up to hospitalization with motivation to maintain sobriety. Patient to use positive coping skills learned during the course of the inpatient hospitalization, eg STOP skill. Patient verbalized willingness to seek care in moment of crisis. Patient is agreeable that should he have any thoughts of hurting himself or others, he will call 911, return to the ED. Patient has been medication compliant, not endorsing any side effects. Patient is future-oriented and goal-directed, reports goal to f/u OP. Patient verbalizing reasons for living. Patient with improved insight into events that led up to hospitalization with motivation to maintain sobriety. Patient to use positive coping skills learned during the course of the inpatient hospitalization, eg STOP skill. Pt agrees to consider AA/NA in the community. Patient verbalized willingness to seek care in moment of crisis. Patient is agreeable that should he have any thoughts of hurting himself or others, he will call 911, return to the ED.

## 2022-04-08 NOTE — DISCHARGE NOTE BEHAVIORAL HEALTH - MEDICATION SUMMARY - MEDICATIONS TO TAKE
I will START or STAY ON the medications listed below when I get home from the hospital:    gabapentin 300 mg oral capsule  -- 1 cap(s) by mouth 3 times a day x 14 days Continue to take as prescribed until told otherwise by your provider  -- Indication: For Pain    risperiDONE 1 mg oral tablet  -- 1 tab(s) by mouth 2 times a day x 14 days Continue to take as prescribed until told otherwise by your provider  -- Indication: For Schizoaffective disorder    albuterol 90 mcg/inh inhalation aerosol  -- 2 puff(s) inhaled every 6 hours x 14 days, As Needed -wheezing/shortness of breath Continue to take as needed until told otherwise by your provider  -- Indication: For Asthma    nicotine 21 mg/24 hr transdermal film, extended release  -- 1 patch by transdermal patch once a day x 14 days Continue to take as prescribed until told otherwise by your provider  -- Indication: For Smoking cessation

## 2022-04-08 NOTE — DISCHARGE NOTE BEHAVIORAL HEALTH - NSBHDCHOUSINGFT_PSY_A_CORE
DSS/Shelter...  [facility] ID - [id number]  [address] [place], [place] [id number] [facility] ID - [id number]  [address] [id number]

## 2022-04-08 NOTE — PROGRESS NOTE BEHAVIORAL HEALTH - NSBHCHARTREVIEWINVESTIGATE_PSY_A_CORE FT
< from: 12 Lead ECG (04.05.22 @ 18:29) >      Ventricular Rate 71 BPM    Atrial Rate 71 BPM    P-R Interval 174 ms    QRS Duration 72 ms    Q-T Interval 412 ms    QTC Calculation(Bazett) 447 ms    P Axis 73 degrees    R Axis 60 degrees    T Axis 47 degrees    Diagnosis Line Sinus rhythm with marked sinus arrhythmia  Otherwise normal ECG    < end of copied text >

## 2022-04-08 NOTE — DISCHARGE NOTE BEHAVIORAL HEALTH - NSTOBACCOREFERRAL_GEN_A_NCS
Patient declined information Patient registered and referred to the [place] [facility]. Phone appointment scheduled for 4/11/22 at 0900./Yes Patient registered and referred to the [place] [facility]. Phone appointment scheduled for 4/11/22 at 0900./Patient declined information

## 2022-04-08 NOTE — DISCHARGE NOTE BEHAVIORAL HEALTH - NSBHDCSUICFCTROTHERFT_PSY_A_CORE
Patient and provider identified future stressors as being nonadherence with medication/outpatient follow up, relapse with illicit substances, financial/residential instability.

## 2022-04-08 NOTE — DISCHARGE NOTE BEHAVIORAL HEALTH - PAST PSYCHIATRIC HISTORY
As noted above.  Pt reports daily alcohol use, 6 packs of beer per day, last use PTA. Reports h/o cocaine use, reports last use 1 month ago, unable to quantify. Reports cigarettes use, 1PPD. He denies cannabis use, denies use of other illicit substances. Of note, UDS +cocaine.

## 2022-04-08 NOTE — DISCHARGE NOTE BEHAVIORAL HEALTH - NSBHDCTHERAPYFT_PSY_A_CORE
Patient was provided with milieu therapy as well as daily supportive psychotherapy. Reinforced positive coping skills learned on the unit, ie STOP skill.

## 2022-04-08 NOTE — DISCHARGE NOTE BEHAVIORAL HEALTH - NSBHDCRESPONSEFT_PSY_A_CORE
Pt has made progress over the course of hospitalization. With continuous psychotherapy from the treatment team and the medications, patient reports feeling better, sleeping and eating well. Thought process and insight improved. Pt was calm and cooperative and was in good behavioral control. Patient denied any suicidal or homicidal ideations. Patient reports improved AVH and paranoia. Although pt refused inpatient rehab at this time, pt verbalizes motivation to maintain sobriety and agrees to OP f/u. Pt was evaluated by treatment team, pt is stable for discharge and patient shows no imminent danger to self, others or property at this time. Pt understands and agrees with treatment plan and following up with outpatient. Psychoeducation provided regarding diagnosis, medications, treatment and follow up. Risks, benefits, alternatives discussed, all questions and concerns addressed and answered. Pt has made progress over the course of hospitalization. With continuous psychotherapy from the treatment team and the medications, patient reports feeling better, sleeping and eating well. Thought process and insight improved. Pt was calm and cooperative and was in good behavioral control. Patient denied any suicidal or homicidal ideations. Patient reports improved AH and paranoia, reports at baseline and able to easily ignore, denies CAH/distress; no overt psychosis noted. Although pt refused inpatient rehab at this time, pt verbalizes motivation to maintain sobriety and agrees to OP f/u. Pt was evaluated by treatment team, pt is stable for discharge and patient shows no imminent danger to self, others or property at this time. Pt understands and agrees with treatment plan and following up with outpatient. Psychoeducation provided regarding diagnosis, medications, treatment and follow up. Risks, benefits, alternatives discussed, all questions and concerns addressed and answered.

## 2022-04-08 NOTE — PROGRESS NOTE BEHAVIORAL HEALTH - NSBHADMITIPBHPROVIDER_PSY_A_CORE
Pt denies OP f/u, reports unable to recollect provider from "out of state"/N/A

## 2022-04-08 NOTE — PROGRESS NOTE BEHAVIORAL HEALTH - NSBHLOC_PSY_A_CORE
Patient returned call stating that he has always had tinnitus but that it is getting worse. He feels like there is fluid behind his ears that moves when he moves his head a certain way. He states that he also has major issues with congestion and is using generic Afrin every day. He has been using it since he started with radiation therapy so that he can get sleep at night. Otherwise he is totally congested. Informed that he would likely need an audiogram, but in the mean time I can ask a provider on Monday if they have any recommendations until we can see him once the pandemic is more under control. Patient verbalized understanding and denies further questions.  
Alert

## 2022-04-08 NOTE — DISCHARGE NOTE BEHAVIORAL HEALTH - NSBHDCSIGEVENTSFT_PSY_A_CORE
Pt with intermittent episodes of agitation d/t wants not being met right away, verbally redirectable, no PRNs

## 2022-04-08 NOTE — DISCHARGE NOTE BEHAVIORAL HEALTH - NSBHDCDIET_PSY_A_CORE
NG Tube found to be coiling at the gastric fundus.  Tube back up 5 cm and reinserted and additional 10cm to a depth of 65cm. Second x-ray places tip of NGT over the stomach.  Patient tolerated without complaint.   regular diet
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
## 2022-04-08 NOTE — DISCHARGE NOTE BEHAVIORAL HEALTH - NSBHDCSUICPROTECTFT_PSY_A_CORE
Medication and treatment adherence, future orientation, goal-directed, ability to state reasons for living, help seeking, willingness to seek care in moment of crisis

## 2022-04-08 NOTE — DISCHARGE NOTE BEHAVIORAL HEALTH - NSBHDCADMRISKREASONS_PSY_A_CORE
Co-occur mental health and subst use Non-adherence with medications/Co-occur mental health and subst use
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
## 2022-04-08 NOTE — DISCHARGE NOTE BEHAVIORAL HEALTH - NSFOLLOWUPCOMMENTS_ALL_CORE_SIUH
[facility]/ [facility] ID - [id number]  [address] [id number]

## 2022-04-08 NOTE — DISCHARGE NOTE BEHAVIORAL HEALTH - NSBHDCMEDSFT_PSY_A_CORE
Started on risperdal 1 mg bid. Pt tolerated medication well, denied negative side effects. Started on risperdal 1 mg bid. Pt started on ativan protocol for objective s/sx of etoh withdrawal; pt with no withdrawal s/sx, did not need ativan during admission.  Pt tolerated medication well, denied negative side effects.

## 2022-04-08 NOTE — DISCHARGE NOTE BEHAVIORAL HEALTH - NSBHDCADMRISKMITFT_PSY_A_CORE
Patient is future-oriented and goal-directed, reports goals to f/u OP and maintaining sobriety. Patient is future-oriented and goal-directed, reports goals to f/u OP and maintaining sobriety, agrees to consider AA/NA in the community.

## 2022-04-08 NOTE — PROGRESS NOTE BEHAVIORAL HEALTH - NSBHFUPINTERVALHXFT_PSY_A_CORE
Pt seen and evaluated, chart reviewed. As per nursing report, no acute events overnight, no PRNs. On evaluation, pt presents calm and cooperative, states he is ready to for discharge. He endorses good mood and sleep. Pt reports improved AH and paranoia. No overt psychosis noted. He denies SI/HI, intent and plan. Pt appears future-oriented and goal-directed, endorses goal to f/u pain specialist in the community, agrees to OP f/u. Pt adherent to medications, denies negative side effects. Of note, AM BP 83/47, orthostatics unremarkable, pt asymptomatic; medical PA consult, to encourage PO intake. Discharge for today. Pt seen and evaluated, chart reviewed. As per nursing report, no acute events overnight, no PRNs. On evaluation, pt presents calm and cooperative, states he is ready to for discharge. He endorses good mood and sleep. Pt reports improved AH and paranoia. No overt psychosis noted. He denies SI/HI, intent and plan. Pt appears future-oriented and goal-directed, endorses goal to f/u pain specialist in the community, agrees to OP f/u. Pt adherent to medications, denies negative side effects. Of note, AM BP 83/47, orthostatics unremarkable, pt asymptomatic; medical PA consult, to encourage PO intake, repeat BP normotensive. Discharge for today. Pt seen and evaluated, chart reviewed. As per nursing report, no acute events overnight, no PRNs. On evaluation, pt presents calm and cooperative, states he is ready to for discharge. He endorses good mood and sleep. Pt reports improved AH and paranoia, reports at baseline and able to easily ignore, denies CAH/distress; no overt psychosis noted. He denies SI/HI, intent and plan. Pt appears future-oriented and goal-directed, endorses goal to f/u pain specialist in the community, agrees to OP f/u. Pt adherent to medications, denies negative side effects. Of note, AM BP 83/47, orthostatics unremarkable, pt asymptomatic; medical PA consult, to encourage PO intake, repeat BP normotensive. Discharge for today. Pt seen and evaluated, chart reviewed. As per nursing report, no acute events overnight, no PRNs, in behavioral control. On evaluation, pt presents calm and cooperative, states he is ready to for discharge. He endorses good mood and sleep. Pt reports improved AH and paranoia, reports at baseline and able to easily ignore, denies CAH/distress; no overt psychosis noted. He denies SI/HI, intent and plan. Pt appears future-oriented and goal-directed, endorses goal to f/u pain specialist in the community, agrees to OP f/u. Pt adherent to medications, denies negative side effects. Of note, AM BP 83/47, orthostatics unremarkable, pt asymptomatic; medical PA consult, to encourage PO intake, repeat BP normotensive. Discharge for today.

## 2022-04-08 NOTE — DISCHARGE NOTE BEHAVIORAL HEALTH - BRAND OF COVID-19 VACCINATION
Patient presented for blood pressure check. New medication prescribed today but patient also noticed small rash above left ankle. Advised patient that rash as rash was not widespread, we would have Dr. Christ Calvo address that at the appointment when he sees him next week. Patient verbalized understanding and has no further questions at this time.
Moderna dose 1 and 2

## 2022-04-08 NOTE — DISCHARGE NOTE BEHAVIORAL HEALTH - MEDICATION SUMMARY - MEDICATIONS TO CHANGE
I will SWITCH the dose or number of times a day I take the medications listed below when I get home from the hospital:    nicotine 7 mg/24 hr transdermal film, extended release  -- 1 patch by transdermal patch once a day x 14 days
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
## 2022-04-08 NOTE — PROGRESS NOTE BEHAVIORAL HEALTH - NSBHCHARTREVIEWLAB_PSY_A_CORE FT
Complete Blood Count + Automated Diff (04.05.22 @ 19:52)   WBC Count: 6.79 K/uL   RBC Count: 4.49 M/uL   Hemoglobin: 14.3 g/dL   Hematocrit: 41.1 %   Mean Cell Volume: 91.5 fL   Mean Cell Hemoglobin: 31.8 pg   Mean Cell Hemoglobin Conc: 34.8 g/dL   Red Cell Distrib Width: 13.4 %   Platelet Count - Automated: 210 K/uL   Auto Neutrophil #: 4.18 K/uL   Auto Lymphocyte #: 1.59 K/uL   Auto Monocyte #: 0.63 K/uL   Auto Eosinophil #: 0.32 K/uL   Auto Basophil #: 0.05 K/uL   Auto Neutrophil %: 61.6  Auto Lymphocyte %: 23.4 %   Auto Monocyte %: 9.3 %   Auto Eosinophil %: 4.7 %   Auto Basophil %: 0.7 %   Auto Immature Granulocyte %: 0.3: (Includes meta, myelo and promyelocytes) %   Nucleated RBC: 0 /100 WBCs   Comprehensive Metabolic Panel (04.05.22 @ 19:52)   Sodium, Serum: 140 mmol/L   Potassium, Serum: 4.2 mmol/L   Chloride, Serum: 102 mmol/L   Carbon Dioxide, Serum: 30 mmol/L   Anion Gap, Serum: 8 mmol/L   Blood Urea Nitrogen, Serum: 21 mg/dL   Creatinine, Serum: 1.2 mg/dL   Glucose, Serum: 83 mg/dL   Calcium, Total Serum: 9.2 mg/dL   Protein Total, Serum: 5.9 g/dL   Albumin, Serum: 3.6 g/dL   Bilirubin Total, Serum: 0.2 mg/dL   Alkaline Phosphatase, Serum: 99 U/L   Aspartate Aminotransferase (AST/SGOT): 23 U/L   Alanine Aminotransferase (ALT/SGPT): 14 U/L   eGFR: 72  Lipid Profile (04.06.22 @ 07:58)   Cholesterol, Serum: 159 mg/dL   Triglycerides, Serum: 86 mg/dL   HDL Cholesterol, Serum: 67 mg/dL   Non HDL Cholesterol: 92  LDL Cholesterol Calculated: 85 mg/dL

## 2022-04-11 LAB
BENZOYLEGONINE, UR RESULT: 2906 NG/ML — SIGNIFICANT CHANGE UP
BZE UR QL SCN: 2906 NG/ML — SIGNIFICANT CHANGE UP
COCAINE IN-HOUSE INTERPRETATION: POSITIVE
COCAINE UR QL SCN: POSITIVE

## 2022-04-13 DIAGNOSIS — J44.9 CHRONIC OBSTRUCTIVE PULMONARY DISEASE, UNSPECIFIED: ICD-10-CM

## 2022-04-13 DIAGNOSIS — F25.9 SCHIZOAFFECTIVE DISORDER, UNSPECIFIED: ICD-10-CM

## 2022-04-13 DIAGNOSIS — M54.2 CERVICALGIA: ICD-10-CM

## 2022-04-13 DIAGNOSIS — Z91.14 PATIENT'S OTHER NONCOMPLIANCE WITH MEDICATION REGIMEN: ICD-10-CM

## 2022-04-13 DIAGNOSIS — F14.10 COCAINE ABUSE, UNCOMPLICATED: ICD-10-CM

## 2022-04-13 DIAGNOSIS — J45.909 UNSPECIFIED ASTHMA, UNCOMPLICATED: ICD-10-CM

## 2022-04-13 DIAGNOSIS — G89.29 OTHER CHRONIC PAIN: ICD-10-CM

## 2022-04-13 DIAGNOSIS — F42.9 OBSESSIVE-COMPULSIVE DISORDER, UNSPECIFIED: ICD-10-CM

## 2022-04-13 DIAGNOSIS — F10.20 ALCOHOL DEPENDENCE, UNCOMPLICATED: ICD-10-CM

## 2022-04-13 DIAGNOSIS — F17.210 NICOTINE DEPENDENCE, CIGARETTES, UNCOMPLICATED: ICD-10-CM

## 2022-04-13 DIAGNOSIS — F25.0 SCHIZOAFFECTIVE DISORDER, BIPOLAR TYPE: ICD-10-CM

## 2022-04-13 DIAGNOSIS — Z59.00 HOMELESSNESS UNSPECIFIED: ICD-10-CM

## 2022-04-13 DIAGNOSIS — R45.851 SUICIDAL IDEATIONS: ICD-10-CM

## 2022-04-13 DIAGNOSIS — F41.0 PANIC DISORDER [EPISODIC PAROXYSMAL ANXIETY]: ICD-10-CM

## 2022-04-13 SDOH — ECONOMIC STABILITY - HOUSING INSECURITY: HOMELESSNESS UNSPECIFIED: Z59.00

## 2022-05-04 ENCOUNTER — APPOINTMENT (OUTPATIENT)
Dept: INTERNAL MEDICINE | Facility: CLINIC | Age: 52
End: 2022-05-04

## 2022-11-01 ENCOUNTER — EMERGENCY (EMERGENCY)
Facility: HOSPITAL | Age: 52
LOS: 0 days | Discharge: HOME | End: 2022-11-01
Attending: EMERGENCY MEDICINE | Admitting: EMERGENCY MEDICINE
Payer: SELF-PAY

## 2022-11-01 VITALS
WEIGHT: 149.91 LBS | SYSTOLIC BLOOD PRESSURE: 110 MMHG | HEART RATE: 90 BPM | OXYGEN SATURATION: 96 % | TEMPERATURE: 98 F | HEIGHT: 68 IN | DIASTOLIC BLOOD PRESSURE: 72 MMHG | RESPIRATION RATE: 19 BRPM

## 2022-11-01 VITALS — DIASTOLIC BLOOD PRESSURE: 80 MMHG | SYSTOLIC BLOOD PRESSURE: 112 MMHG | HEART RATE: 80 BPM
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
## 2022-11-01 DIAGNOSIS — Z98.890 OTHER SPECIFIED POSTPROCEDURAL STATES: Chronic | ICD-10-CM

## 2022-11-01 DIAGNOSIS — Z23 ENCOUNTER FOR IMMUNIZATION: ICD-10-CM

## 2022-11-01 DIAGNOSIS — F42.9 OBSESSIVE-COMPULSIVE DISORDER, UNSPECIFIED: ICD-10-CM

## 2022-11-01 DIAGNOSIS — S02.40CA MAXILLARY FRACTURE, RIGHT SIDE, INITIAL ENCOUNTER FOR CLOSED FRACTURE: ICD-10-CM

## 2022-11-01 DIAGNOSIS — Z88.8 ALLERGY STATUS TO OTHER DRUGS, MEDICAMENTS AND BIOLOGICAL SUBSTANCES STATUS: ICD-10-CM

## 2022-11-01 DIAGNOSIS — Z87.39 PERSONAL HISTORY OF OTHER DISEASES OF THE MUSCULOSKELETAL SYSTEM AND CONNECTIVE TISSUE: ICD-10-CM

## 2022-11-01 DIAGNOSIS — Z98.890 OTHER SPECIFIED POSTPROCEDURAL STATES: ICD-10-CM

## 2022-11-01 DIAGNOSIS — S02.31XA FRACTURE OF ORBITAL FLOOR, RIGHT SIDE, INITIAL ENCOUNTER FOR CLOSED FRACTURE: ICD-10-CM

## 2022-11-01 DIAGNOSIS — F20.9 SCHIZOPHRENIA, UNSPECIFIED: ICD-10-CM

## 2022-11-01 DIAGNOSIS — M79.641 PAIN IN RIGHT HAND: ICD-10-CM

## 2022-11-01 DIAGNOSIS — S60.811A ABRASION OF RIGHT WRIST, INITIAL ENCOUNTER: ICD-10-CM

## 2022-11-01 DIAGNOSIS — Z91.013 ALLERGY TO SEAFOOD: ICD-10-CM
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
## 2022-11-01 DIAGNOSIS — H57.89 OTHER SPECIFIED DISORDERS OF EYE AND ADNEXA: ICD-10-CM

## 2022-11-01 DIAGNOSIS — S01.111A LACERATION WITHOUT FOREIGN BODY OF RIGHT EYELID AND PERIOCULAR AREA, INITIAL ENCOUNTER: ICD-10-CM

## 2022-11-01 DIAGNOSIS — Z91.012 ALLERGY TO EGGS: ICD-10-CM

## 2022-11-01 DIAGNOSIS — L03.213 PERIORBITAL CELLULITIS: ICD-10-CM

## 2022-11-01 DIAGNOSIS — H57.11 OCULAR PAIN, RIGHT EYE: ICD-10-CM

## 2022-11-01 LAB
ALBUMIN SERPL ELPH-MCNC: 4.1 G/DL — SIGNIFICANT CHANGE UP (ref 3.5–5.2)
ALP SERPL-CCNC: 72 U/L — SIGNIFICANT CHANGE UP (ref 30–115)
ALT FLD-CCNC: 16 U/L — SIGNIFICANT CHANGE UP (ref 0–41)
ANION GAP SERPL CALC-SCNC: 13 MMOL/L — SIGNIFICANT CHANGE UP (ref 7–14)
AST SERPL-CCNC: 27 U/L — SIGNIFICANT CHANGE UP (ref 0–41)
BASOPHILS # BLD AUTO: 0.08 K/UL — SIGNIFICANT CHANGE UP (ref 0–0.2)
BASOPHILS NFR BLD AUTO: 0.7 % — SIGNIFICANT CHANGE UP (ref 0–1)
BILIRUB SERPL-MCNC: 0.6 MG/DL — SIGNIFICANT CHANGE UP (ref 0.2–1.2)
BUN SERPL-MCNC: 13 MG/DL — SIGNIFICANT CHANGE UP (ref 10–20)
CALCIUM SERPL-MCNC: 9 MG/DL — SIGNIFICANT CHANGE UP (ref 8.4–10.5)
CHLORIDE SERPL-SCNC: 100 MMOL/L — SIGNIFICANT CHANGE UP (ref 98–110)
CO2 SERPL-SCNC: 23 MMOL/L — SIGNIFICANT CHANGE UP (ref 17–32)
CREAT SERPL-MCNC: 0.9 MG/DL — SIGNIFICANT CHANGE UP (ref 0.7–1.5)
EGFR: 101 ML/MIN/1.73M2 — SIGNIFICANT CHANGE UP
EOSINOPHIL # BLD AUTO: 0.34 K/UL — SIGNIFICANT CHANGE UP (ref 0–0.7)
EOSINOPHIL NFR BLD AUTO: 3.1 % — SIGNIFICANT CHANGE UP (ref 0–8)
GLUCOSE SERPL-MCNC: 81 MG/DL — SIGNIFICANT CHANGE UP (ref 70–99)
HCT VFR BLD CALC: 41.3 % — LOW (ref 42–52)
HGB BLD-MCNC: 14.8 G/DL — SIGNIFICANT CHANGE UP (ref 14–18)
IMM GRANULOCYTES NFR BLD AUTO: 0.3 % — SIGNIFICANT CHANGE UP (ref 0.1–0.3)
LYMPHOCYTES # BLD AUTO: 18.8 % — LOW (ref 20.5–51.1)
LYMPHOCYTES # BLD AUTO: 2.08 K/UL — SIGNIFICANT CHANGE UP (ref 1.2–3.4)
MCHC RBC-ENTMCNC: 32.9 PG — HIGH (ref 27–31)
MCHC RBC-ENTMCNC: 35.8 G/DL — SIGNIFICANT CHANGE UP (ref 32–37)
MCV RBC AUTO: 91.8 FL — SIGNIFICANT CHANGE UP (ref 80–94)
MONOCYTES # BLD AUTO: 1.29 K/UL — HIGH (ref 0.1–0.6)
MONOCYTES NFR BLD AUTO: 11.6 % — HIGH (ref 1.7–9.3)
NEUTROPHILS # BLD AUTO: 7.27 K/UL — HIGH (ref 1.4–6.5)
NEUTROPHILS NFR BLD AUTO: 65.5 % — SIGNIFICANT CHANGE UP (ref 42.2–75.2)
NRBC # BLD: 0 /100 WBCS — SIGNIFICANT CHANGE UP (ref 0–0)
PLATELET # BLD AUTO: 206 K/UL — SIGNIFICANT CHANGE UP (ref 130–400)
POTASSIUM SERPL-MCNC: 3.9 MMOL/L — SIGNIFICANT CHANGE UP (ref 3.5–5)
POTASSIUM SERPL-SCNC: 3.9 MMOL/L — SIGNIFICANT CHANGE UP (ref 3.5–5)
PROT SERPL-MCNC: 6.7 G/DL — SIGNIFICANT CHANGE UP (ref 6–8)
RBC # BLD: 4.5 M/UL — LOW (ref 4.7–6.1)
RBC # FLD: 12.1 % — SIGNIFICANT CHANGE UP (ref 11.5–14.5)
SODIUM SERPL-SCNC: 136 MMOL/L — SIGNIFICANT CHANGE UP (ref 135–146)
WBC # BLD: 11.09 K/UL — HIGH (ref 4.8–10.8)
WBC # FLD AUTO: 11.09 K/UL — HIGH (ref 4.8–10.8)

## 2022-11-01 PROCEDURE — 99285 EMERGENCY DEPT VISIT HI MDM: CPT

## 2022-11-01 PROCEDURE — 73130 X-RAY EXAM OF HAND: CPT | Mod: 26,RT

## 2022-11-01 PROCEDURE — 73110 X-RAY EXAM OF WRIST: CPT | Mod: 26,RT

## 2022-11-01 PROCEDURE — 99053 MED SERV 10PM-8AM 24 HR FAC: CPT

## 2022-11-01 PROCEDURE — 70487 CT MAXILLOFACIAL W/DYE: CPT | Mod: 26,MA

## 2022-11-01 PROCEDURE — 70450 CT HEAD/BRAIN W/O DYE: CPT | Mod: 26,MA

## 2022-11-01 RX ORDER — KETOROLAC TROMETHAMINE 30 MG/ML
30 SYRINGE (ML) INJECTION ONCE
Refills: 0 | Status: DISCONTINUED | OUTPATIENT
Start: 2022-11-01 | End: 2022-11-01

## 2022-11-01 RX ORDER — ERYTHROMYCIN BASE 5 MG/GRAM
1 OINTMENT (GRAM) OPHTHALMIC (EYE) ONCE
Refills: 0 | Status: COMPLETED | OUTPATIENT
Start: 2022-11-01 | End: 2022-11-01

## 2022-11-01 RX ORDER — TETANUS TOXOID, REDUCED DIPHTHERIA TOXOID AND ACELLULAR PERTUSSIS VACCINE, ADSORBED 5; 2.5; 8; 8; 2.5 [IU]/.5ML; [IU]/.5ML; UG/.5ML; UG/.5ML; UG/.5ML
0.5 SUSPENSION INTRAMUSCULAR ONCE
Refills: 0 | Status: COMPLETED | OUTPATIENT
Start: 2022-11-01 | End: 2022-11-01

## 2022-11-01 RX ORDER — ONDANSETRON 8 MG/1
4 TABLET, FILM COATED ORAL ONCE
Refills: 0 | Status: COMPLETED | OUTPATIENT
Start: 2022-11-01 | End: 2022-11-01

## 2022-11-01 RX ORDER — ERYTHROMYCIN BASE IN ETHANOL 2 %
1 SWAB, MEDICATED TOPICAL
Qty: 1 | Refills: 0
Start: 2022-11-01 | End: 2022-11-10

## 2022-11-01 RX ADMIN — Medication 30 MILLIGRAM(S): at 07:02

## 2022-11-01 RX ADMIN — Medication 30 MILLIGRAM(S): at 03:31

## 2022-11-01 RX ADMIN — Medication 1 TABLET(S): at 07:48

## 2022-11-01 RX ADMIN — Medication 1 APPLICATION(S): at 18:53

## 2022-11-01 RX ADMIN — TETANUS TOXOID, REDUCED DIPHTHERIA TOXOID AND ACELLULAR PERTUSSIS VACCINE, ADSORBED 0.5 MILLILITER(S): 5; 2.5; 8; 8; 2.5 SUSPENSION INTRAMUSCULAR at 07:48

## 2022-11-01 NOTE — ED ADULT NURSE NOTE - OBJECTIVE STATEMENT
Pt complains of pain to face. States he fell x 1 week ago. Pt noted with scab to right wrist and swelling to right eye
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
## 2022-11-01 NOTE — ED PROVIDER NOTE - NS ED ROS FT
Constitutional: No fever  Eyes: +eyelid/facial swelling. No visual changes, eye pain, or discharge.  ENMT:  No sore throat, ear pain  Cardiac:  No chest pain, SOB or edema. No chest pain with exertion.  Respiratory:  No cough or respiratory distress.   GI:  No nausea, vomiting, diarrhea or abdominal pain.  :  No dysuria, frequency or burning.  MS:  No myalgia, muscle weakness, joint pain or back pain.  Neuro:  No headache or weakness.  No LOC.  Skin:  No skin rash.

## 2022-11-01 NOTE — ED PROVIDER NOTE - PHYSICAL EXAMINATION
CONSTITUTIONAL: Well-developed; well-nourished; in no acute distress.   SKIN: Warm, dry  HEAD: Normocephalic; see below  EYES: Significant upper and lower right eyelid swelling and erythema with yellow crusting superiorly. Unable to evaluate eye due to swelling. Left eye pupil reactive to light.  ENT: No nasal discharge; airway clear.  NECK: Supple; non tender. FROM.  CARD:  Regular rate and rhythm. Normal S1, S2  RESP: No increased WOB. CTA b/l without wheezes, crackles, rhonchi  ABD: Normoactive BS. Soft, nontender, nondistended.  EXT: Normal ROM. Right wrist dorsum abrasion  NEURO: Alert, oriented, grossly unremarkable  PSYCH: Cooperative, appropriate.

## 2022-11-01 NOTE — CONSULT NOTE ADULT - SUBJECTIVE AND OBJECTIVE BOX
52 male Pmhx schizophrenia presents as a consult for preseptal cellulitis. Per patient he fell last week and his his right eye. No changes in vison, flashes or floaters. pt endorses eyelid pain and swelling. No double vision.    Denies past ocular hx    Exam  VA 20/40 OD 20/25 OS  IOP 16/11  PERRLA    Lids/Lashes: RUL edema and crusting, 1 cmm laceration healing by secondary intention OS WNL   Conj/Sclera: W+Q OU  K: clear OU  AC: deep and quiet OU  Iris  RR OU  Lens: trace NSC OU    Vitreous: clr ou  optic nerve: .3 s/p OU   Macula: flat OU  Vessels WNL OU  Periphery normal, no HHT OU    CT maxillofacial:  R orbital floor fracture; globes intact     A/P  1.  R orbital floor fracture:  - no diplopia, eoms full, no apd, DFE WNL  - no acute intervention; recommend ice packs and pain control  - pt can follow with OMFS as outpatient    2. Preseptal edema  - likely from prior fall, small amt of discharge from laceration  - recommend PO Augmentin 10-14 days  - erythromycin michela QID to RUL; will continue to let heal by secondary intent  - tylenol for pain control    Melody Mae PGY4  D/w Dr. TRIPP Garay

## 2022-11-01 NOTE — ED PROVIDER NOTE - OBJECTIVE STATEMENT
51 y/o M with PMH schizophrenia, OCD presenting with right eyelid swelling, pain, discharge x1 week, worsening. Also c/o right hand pain. Pt states he fell onto face 1 week ago, unsure of mechanism, denies LOC. Did not seek medical attention after fall. Denies eye pain, pain with eye movement, vision changes, headache, dizziness, fever.
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
## 2022-11-01 NOTE — ED PROVIDER NOTE - NSFOLLOWUPINSTRUCTIONS_ED_ALL_ED_FT
Follow up with ophthalmology clinic. Contact information provided.      Preseptal Cellulitis, Adult      Preseptal cellulitis is an infection of the eyelid and the tissues around the eye (periorbital area). The infection causes painful swelling and redness. This condition may also be called periorbital cellulitis.    In most cases, the condition can be treated with antibiotic medicine at home. It is important to treat preseptal cellulitis right away so that it does not get worse. If it gets worse, it can spread to the eye socket and eye muscles (orbital cellulitis). Orbital cellulitis is a medical emergency.      What are the causes?    Preseptal cellulitis is most commonly caused by bacteria. In rare cases, it can be caused by a virus or fungus. The germs that cause preseptal cellulitis may come from:  •A sinus infection that spreads near the eyes.      •An injury near the eye, such as a scratch, puncture wound, animal bite, or insect bite.      •A skin rash, such as eczema or poison ivy, that becomes infected.      •An infected pimple on the eyelid (stye).      •Infection after eyelid surgery or injury.        What increases the risk?    You are more likely to develop this condition if:  •You have a weakened disease-fighting system (immune system).      •You have a medical condition that raises your risk for sinus infections, such as nasal polyps.        What are the signs or symptoms?     Symptoms of this condition include:  •Eyelids that are red and swollen and feel unusually hot.      •Fever.      •Difficulty opening the eye.      •Headache.      •Pain in the face.      Symptoms of this condition usually develop suddenly.      How is this diagnosed?    This condition may be diagnosed based on your symptoms, your medical history, and an eye exam. You may also have tests, such as:  •Blood tests.      •Tests (cultures) to find out which specific bacteria are causing the infection. You may have a culture of any open wound or drainage.      •CT scan.      •MRI. This is less common.        How is this treated?    This condition is treated with antibiotic medicines. These may be given by mouth (orally), through an IV, or as an injection. In rare cases, you may need surgery to drain an infected area.      Follow these instructions at home:    Medicines     •Take your antibiotic medicine as told by your health care provider. Do not stop taking the antibiotic even if you start to feel better      •Take over-the-counter and prescription medicines only as told by your health care provider.      Eye Care     • Do not use eye drops without first getting approval from your health care provider.      • Do not touch or rub your eye. If you wear contact lenses, do not wear them until your health care provider approves.      •Keep the eye area clean and dry.      •Wash the eye area with a clean washcloth, warm water, and baby shampoo or mild soap.      •To help relieve discomfort, place a clean washcloth that is wet with warm water over your eye. Leave the washcloth on for a few minutes, then remove it.      General instructions     •Wash your hands with soap and water often for at least 20 seconds. If soap and water are not available, use hand .      • Do not use any products that contain nicotine or tobacco, such as cigarettes, e-cigarettes, and chewing tobacco. If you need help quitting, ask your health care provider.      •Drink enough fluid to keep your urine pale yellow.      • Do not drive or operate machinery until your health care provider says that it is safe. Ask your health care provider if it is safe for you to drive.      •Stay up to date on your vaccinations.      •Keep all follow-up visits. This includes any visits with an eye specialist (ophthalmologist) or dentist. This is important.        Get help right away if:    •You have new symptoms.      •Your symptoms get worse or do not get better with treatment.      •You have a fever.      •Your vision becomes blurry or gets worse in any way.      •Your eye looks like it is sticking out or bulging out (proptosis).      •You develop double vision.      •You have trouble moving your eyes or pain when moving your eyes      •You have a severe headache.      •You have neck stiffness or severe neck pain.      These symptoms may represent a serious problem that is an emergency. Do not wait to see if the symptoms will go away. Get medical help right away. Call your local emergency services (911 in the [place] Do not drive yourself to the hospital.       Summary    •Preseptal cellulitis is an infection of the eyelid and the tissues around the eye.      •Symptoms of preseptal cellulitis usually develop suddenly and include red and swollen eyelids, fever, difficulty opening the eye, headache, and facial pain.      •This condition is treated with antibiotic medicines. Do not stop taking the antibiotic even if you start to feel better.      •Preseptal cellulitis can develop into orbital cellulitis, which is a medical emergency. If your condition does not improve or worsens, visit your nadia care provider right away.      This information is not intended to replace advice given to you by your health care provider. Make sure you discuss any questions you have with your health care provider. Follow up with the dentist  - Please  the medication sent to your pharmacy and take as prescribed  - You may take Tylenol for pain/fever every 4-6 hours as needed          Preseptal Cellulitis, Adult      Preseptal cellulitis is an infection of the eyelid and the tissues around the eye (periorbital area). The infection causes painful swelling and redness. This condition may also be called periorbital cellulitis.    In most cases, the condition can be treated with antibiotic medicine at home. It is important to treat preseptal cellulitis right away so that it does not get worse. If it gets worse, it can spread to the eye socket and eye muscles (orbital cellulitis). Orbital cellulitis is a medical emergency.      What are the causes?    Preseptal cellulitis is most commonly caused by bacteria. In rare cases, it can be caused by a virus or fungus. The germs that cause preseptal cellulitis may come from:  •A sinus infection that spreads near the eyes.      •An injury near the eye, such as a scratch, puncture wound, animal bite, or insect bite.      •A skin rash, such as eczema or poison ivy, that becomes infected.      •An infected pimple on the eyelid (stye).      •Infection after eyelid surgery or injury.        What increases the risk?    You are more likely to develop this condition if:  •You have a weakened disease-fighting system (immune system).      •You have a medical condition that raises your risk for sinus infections, such as nasal polyps.        What are the signs or symptoms?     Symptoms of this condition include:  •Eyelids that are red and swollen and feel unusually hot.      •Fever.      •Difficulty opening the eye.      •Headache.      •Pain in the face.      Symptoms of this condition usually develop suddenly.      How is this diagnosed?    This condition may be diagnosed based on your symptoms, your medical history, and an eye exam. You may also have tests, such as:  •Blood tests.      •Tests (cultures) to find out which specific bacteria are causing the infection. You may have a culture of any open wound or drainage.      •CT scan.      •MRI. This is less common.        How is this treated?    This condition is treated with antibiotic medicines. These may be given by mouth (orally), through an IV, or as an injection. In rare cases, you may need surgery to drain an infected area.      Follow these instructions at home:    Medicines     •Take your antibiotic medicine as told by your health care provider. Do not stop taking the antibiotic even if you start to feel better      •Take over-the-counter and prescription medicines only as told by your health care provider.      Eye Care     • Do not use eye drops without first getting approval from your health care provider.      • Do not touch or rub your eye. If you wear contact lenses, do not wear them until your health care provider approves.      •Keep the eye area clean and dry.      •Wash the eye area with a clean washcloth, warm water, and baby shampoo or mild soap.      •To help relieve discomfort, place a clean washcloth that is wet with warm water over your eye. Leave the washcloth on for a few minutes, then remove it.      General instructions     •Wash your hands with soap and water often for at least 20 seconds. If soap and water are not available, use hand .      • Do not use any products that contain nicotine or tobacco, such as cigarettes, e-cigarettes, and chewing tobacco. If you need help quitting, ask your health care provider.      •Drink enough fluid to keep your urine pale yellow.      • Do not drive or operate machinery until your health care provider says that it is safe. Ask your health care provider if it is safe for you to drive.      •Stay up to date on your vaccinations.      •Keep all follow-up visits. This includes any visits with an eye specialist (ophthalmologist) or dentist. This is important.        Get help right away if:    •You have new symptoms.      •Your symptoms get worse or do not get better with treatment.      •You have a fever.      •Your vision becomes blurry or gets worse in any way.      •Your eye looks like it is sticking out or bulging out (proptosis).      •You develop double vision.      •You have trouble moving your eyes or pain when moving your eyes      •You have a severe headache.      •You have neck stiffness or severe neck pain.      These symptoms may represent a serious problem that is an emergency. Do not wait to see if the symptoms will go away. Get medical help right away. Call your local emergency services (911 in the [place] Do not drive yourself to the hospital.       Summary    •Preseptal cellulitis is an infection of the eyelid and the tissues around the eye.      •Symptoms of preseptal cellulitis usually develop suddenly and include red and swollen eyelids, fever, difficulty opening the eye, headache, and facial pain.      •This condition is treated with antibiotic medicines. Do not stop taking the antibiotic even if you start to feel better.      •Preseptal cellulitis can develop into orbital cellulitis, which is a medical emergency. If your condition does not improve or worsens, visit your nadia care provider right away.      This information is not intended to replace advice given to you by your health care provider. Make sure you discuss any questions you have with your health care provider.

## 2022-11-01 NOTE — ED PROVIDER NOTE - NSFOLLOWUPCLINICS_GEN_ALL_ED_FT
[facility]  Dental  [address] [place] [id number]  Phone: [phone]  Fax:   Follow Up Time: 1-3 Days

## 2022-11-01 NOTE — ED PROVIDER NOTE - PROGRESS NOTE DETAILS
Rosanne: OMFS (dental covering) consulted. ED Attending LOUIE Aguirre  Patient endorsed to me from Dr. Motta.  52-year-old male with past medical history of OCD, schizophrenia, presents with right eyelid swelling, discharge, and throbbing constant moderate in intensity pain, nonradiating, worse with palpation, better at rest as well as right hand pain after he fell about a week ago unsure of mechanism denies any LOC or being on anticoagulation.  Patient will not open his right eye on his own due to the swelling but no periorbital ecchymosis, no subconjunctival hemorrhage, no proptosis.  No asymmetry of pupils.  Visual acuity 20/20 bilaterally.  CT head and maxillofacial was done showing right periorbital soft tissue edema without evidence of retrobulbar involvement, posteriorly displaced fracture of the right maxillary sinus wall anteriorly as well as medially displaced fracture of the right maxillary sinus wall laterally and redemonstrated chronic deformity of the nasal bone and frontal processes of the bilateral maxillary with increased rightward deviation.  X-ray of wrist and hand was negative.  Patient received Tdap, Toradol, amoxicillin and Bactrim.  Seen by OMFS and states topical bacitracin, ophthalmology consult.  Opto was consulted report will call back. AH - Received signout from MD Lay.  Patient evaluated by dental and OMFS, recommending topical bacitracin, no acute intervention.  Also recommending Ophtho consult.  Ophthalmology was consulted early in the morning.  Stated they will see the patient, followed up with Ophtho clinic, recommending patient come to clinic now.  Sending patient to Ortho clinic.  Patient remained stable, no acute concerns. ED Attending LOUIE Aguirre  Ophthalmology report patient can be transferred to the clinic for further evaluation and treatment.  Patient aware of plan. Being transferred. ED Attending LOUIE Aguirre  Pt endorsed to Dr. Caldera, please follow up on pt after optho eval. AH - Patient arrived back from Ortho clinic.  No acute complaints.  Will give prescription, Rx sent.  We will follow-up with OMFS.  Patient understands plan.  Repeating vitals.  Patient has no complaints. Patient to be discharged from ED. Any available test results were discussed with patient and/or family. Verbal instructions given, including instructions to return to ED immediately for any new, worsening, or concerning symptoms. Strict return precautions given. Written discharge instructions additionally given, including follow-up plan

## 2022-11-01 NOTE — CONSULT NOTE ADULT - ASSESSMENT
52y y.o. Male presents to the ED status post traumatic injury, patient fell but does not recall details.         Patient denies loss of consciousness. Patient is currently is he in pain from the right eye. Dental/oral surgery were consulted to evaluate any possible fractures.    Allergies:allergic to fish eggs (Hives)  Motrin (Stomach Upset)  No Known Drug Allergies  Tylenol (Stomach Upset)      Medications:amoxicillin  875 milliGRAM(s)/clavulanate 1 Tablet(s) Oral Once  diphtheria/tetanus/pertussis (acellular) Vaccine (ADAcel) 0.5 milliLiter(s) IntraMuscular once  trimethoprim  160 mG/sulfamethoxazole 800 mG 1 Tablet(s) Oral Once      Health Issues: ^EYE INJURY    No pertinent family history in first degree relatives    Family history of diabetes mellitus (DM) (Father, Mother)    MEWS Score    No pertinent past medical history    Schizophrenia    OCD (obsessive compulsive disorder)    Preseptal cellulitis of right eye    History of hip surgery    H/O cervical spine surgery    EYE INJURY      Maxillary sinus fracture    Facial trauma    Hand pain              Exam:    Vitals: T(C): 36.8 (11-01-22 @ 02:26), Max: 36.8 (11-01-22 @ 02:26)  HR: 90 (11-01-22 @ 02:26) (90 - 90)  BP: 110/72 (11-01-22 @ 02:26) (110/72 - 110/72)  RR: 19 (11-01-22 @ 02:26) (19 - 19)  SpO2: 96% (11-01-22 @ 02:26) (96% - 96%)      Parameters below as of 01 Nov 2022 02:26  Patient On (Oxygen Delivery Method): room air        LABS:                        14.8   11.09 )-----------( 206      ( 01 Nov 2022 04:29 )             41.3     11-01    136  |  100  |  13  ----------------------------<  81  3.9   |  23  |  0.9    Ca    9.0      01 Nov 2022 04:29    TPro  6.7  /  Alb  4.1  /  TBili  0.6  /  DBili  x   /  AST  27  /  ALT  16  /  AlkPhos  72  11-01    WBC Count: 11.09 K/uL *H* [4.80 - 10.80] (11-01 @ 04:29)  Platelet Count - Automated: 206 K/uL [130 - 400] (11-01 @ 04:29)    General: calm, resting in bed    Neuro: AAOX3, GCS 15,     Head: normacephalic , no lacerations/abrasions of the head, no travis signs, no step deformity to zygomatic arches, no skull fractures , base of skull intact     Ears: Bilateral shape and symmetry, no tenderness, no discharge, no Travis’s signs, no tinnitus, hearing intact     Eyes:  ,Patient can not open right eye due to swelling.  No proptosis, no bony steps noted, no racoon signs, right periorbital edema , no subconjuctival hemorrhage.      Nose: symmetry, no lacerations/abrasion, no tenderness, no discharge, no hematoma of septum, no crepitus, no mobility or deformities    Face: Trigeminal nerve (V1, V2, V3) intact bilaterally, Facial nerve (VII) intact bilaterally    IOE: no lacerations, no swellings, no abrasions, no fractured teeth noted, no petechiae of FOM, tongue full range mobility, uvula in the midline, no false point of mandible, maxilla intact, edentulous mandible, no deviation on opening, no Trismus, partial permanent dentition, poor OH    Neck: no masses, full range of motion, no lacerations, no lymphadenopathy, swelling or edema present      Procedure: emergency examination, OMFS consult      Recommendations:  1. Ophthalmologist consult   2. Follow up with outpatient dentist  3. If any difficulty breathing come back to ED    Patient seen by Alena Hernandes DDS  Case discussed with Dr. Wu DDS     52y y.o. Male presents to the ED status post traumatic injury, patient fell but does not recall details.         Patient denies loss of consciousness. Patient is currently is he in pain from the right eye. Dental/oral surgery were consulted to evaluate any possible fractures.    Allergies:allergic to fish eggs (Hives)  Motrin (Stomach Upset)  No Known Drug Allergies  Tylenol (Stomach Upset)      Medications:amoxicillin  875 milliGRAM(s)/clavulanate 1 Tablet(s) Oral Once  diphtheria/tetanus/pertussis (acellular) Vaccine (ADAcel) 0.5 milliLiter(s) IntraMuscular once  trimethoprim  160 mG/sulfamethoxazole 800 mG 1 Tablet(s) Oral Once      Health Issues: ^EYE INJURY    No pertinent family history in first degree relatives    Family history of diabetes mellitus (DM) (Father, Mother)    MEWS Score    No pertinent past medical history    Schizophrenia    OCD (obsessive compulsive disorder)    Preseptal cellulitis of right eye    History of hip surgery    H/O cervical spine surgery    EYE INJURY      Maxillary sinus fracture    Facial trauma    Hand pain              Exam:    Vitals: T(C): 36.8 (11-01-22 @ 02:26), Max: 36.8 (11-01-22 @ 02:26)  HR: 90 (11-01-22 @ 02:26) (90 - 90)  BP: 110/72 (11-01-22 @ 02:26) (110/72 - 110/72)  RR: 19 (11-01-22 @ 02:26) (19 - 19)  SpO2: 96% (11-01-22 @ 02:26) (96% - 96%)      Parameters below as of 01 Nov 2022 02:26  Patient On (Oxygen Delivery Method): room air        LABS:                        14.8   11.09 )-----------( 206      ( 01 Nov 2022 04:29 )             41.3     11-01    136  |  100  |  13  ----------------------------<  81  3.9   |  23  |  0.9    Ca    9.0      01 Nov 2022 04:29    TPro  6.7  /  Alb  4.1  /  TBili  0.6  /  DBili  x   /  AST  27  /  ALT  16  /  AlkPhos  72  11-01    WBC Count: 11.09 K/uL *H* [4.80 - 10.80] (11-01 @ 04:29)  Platelet Count - Automated: 206 K/uL [130 - 400] (11-01 @ 04:29)    General: calm, resting in bed    Neuro: AAOX3, GCS 15,     Head: normacephalic , no lacerations/abrasions of the head, no travis signs, no step deformity to zygomatic arches, no skull fractures , base of skull intact     Ears: Bilateral shape and symmetry, no tenderness, no discharge, no Travis’s signs, no tinnitus, hearing intact     Eyes:  ,Patient can not open right eye due to swelling.  No proptosis, no bony steps noted, no racoon signs, right periorbital edema , no subconjuctival hemorrhage.      Nose: symmetry, no lacerations/abrasion, no tenderness, no discharge, no hematoma of septum, no crepitus, no mobility or deformities    Face: Trigeminal nerve (V1, V2, V3) intact bilaterally, Facial nerve (VII) intact bilaterally    IOE: no lacerations, no swellings, no abrasions, no fractured teeth noted, no petechiae of FOM, tongue full range mobility, uvula in the midline, no false point of mandible, maxilla intact, edentulous mandible, no deviation on opening, no Trismus, partial permanent dentition, poor OH    Neck: no masses, full range of motion, no lacerations, no lymphadenopathy, swelling or edema present      Procedure: emergency examination, OMFS consult      Recommendations:  1. Topical Bacitracin around the wound of right eye.   2. Ophthalmologist consult   3. Follow up with outpatient dentist  4. If any difficulty breathing come back to ED    Patient seen by Alena Hernandes DDS  Case discussed with Dr. Wu DDS

## 2023-02-08 NOTE — ED BEHAVIORAL HEALTH ASSESSMENT NOTE - NSBHSACOC_PSY_A_CORE FT
USE WARM COMPRESSES    USE DROPS IN LEFT EYE EVERY 4 HOURS WHILE AWAKE      What Is Conjunctivitis?    Conjunctivitis is an irritation or infection. It affects the membrane that covers the white of your eye and the inside of your eyelid (conjunctiva). It can happen to one or both eyes. The membrane swells and the blood vessels enlarge (dilate). This makes your eye red. That's why conjunctivitis is sometimes called red eye or pink eye.  What are the symptoms?  If you have one or more of these symptoms, see an eye doctor:  · Redness in and around your eye  · Eyes that are puffy and sore  · Itching, burning, or stinging eyes  · Watery eyes or discharge from your eye  · Eyelids that are crusty or stuck together when you wake up in the morning  · Pink color in the whites of one or both eyes  Getting treatment quickly can help prevent damage to your eyes.  How is it diagnosed?  Conjunctivitis is usually a minor eye infection. But it can sometimes become a more serious problem. Some more serious eye diseases have symptoms that look like conjunctivitis. So it's important for an eye doctor to diagnose you. Your eye doctor will ask about your symptoms and any medicines you take. He or she will ask about any illnesses or medical conditions you may have. The doctor will also check your eyes with a hand-held light and a special microscope called a slit lamp.  Date Last Reviewed: 6/11/2015 © 2000-2017 [employer]. [address] [place] [address], [place] [address]. All rights reserved. This information is not intended as a substitute for professional medical care. Always follow your healthcare professional's instructions.         Clothing reports intermittent use, last smoked crack a few days ago

## 2023-03-18 ENCOUNTER — EMERGENCY (EMERGENCY)
Facility: HOSPITAL | Age: 53
LOS: 0 days | Discharge: HOME | End: 2023-03-18
Attending: EMERGENCY MEDICINE
Payer: SELF-PAY

## 2023-03-18 VITALS
WEIGHT: 151.68 LBS | OXYGEN SATURATION: 98 % | HEART RATE: 78 BPM | HEIGHT: 68 IN | TEMPERATURE: 98 F | DIASTOLIC BLOOD PRESSURE: 72 MMHG | RESPIRATION RATE: 18 BRPM | SYSTOLIC BLOOD PRESSURE: 116 MMHG

## 2023-03-18 VITALS
RESPIRATION RATE: 19 BRPM | HEART RATE: 76 BPM | TEMPERATURE: 97 F | DIASTOLIC BLOOD PRESSURE: 57 MMHG | SYSTOLIC BLOOD PRESSURE: 93 MMHG | OXYGEN SATURATION: 98 %

## 2023-03-18 DIAGNOSIS — R29.818 OTHER SYMPTOMS AND SIGNS INVOLVING THE NERVOUS SYSTEM: ICD-10-CM

## 2023-03-18 DIAGNOSIS — M79.601 PAIN IN RIGHT ARM: ICD-10-CM

## 2023-03-18 DIAGNOSIS — Z98.890 OTHER SPECIFIED POSTPROCEDURAL STATES: Chronic | ICD-10-CM

## 2023-03-18 DIAGNOSIS — F42.9 OBSESSIVE-COMPULSIVE DISORDER, UNSPECIFIED: ICD-10-CM

## 2023-03-18 DIAGNOSIS — F25.9 SCHIZOAFFECTIVE DISORDER, UNSPECIFIED: ICD-10-CM

## 2023-03-18 DIAGNOSIS — Z76.0 ENCOUNTER FOR ISSUE OF REPEAT PRESCRIPTION: ICD-10-CM

## 2023-03-18 DIAGNOSIS — F41.9 ANXIETY DISORDER, UNSPECIFIED: ICD-10-CM

## 2023-03-18 DIAGNOSIS — J45.909 UNSPECIFIED ASTHMA, UNCOMPLICATED: ICD-10-CM

## 2023-03-18 DIAGNOSIS — Z88.6 ALLERGY STATUS TO ANALGESIC AGENT: ICD-10-CM

## 2023-03-18 DIAGNOSIS — F20.9 SCHIZOPHRENIA, UNSPECIFIED: ICD-10-CM

## 2023-03-18 LAB
ANION GAP SERPL CALC-SCNC: 12 MMOL/L — SIGNIFICANT CHANGE UP (ref 7–14)
APAP SERPL-MCNC: <5 UG/ML — LOW (ref 10–30)
BASOPHILS # BLD AUTO: 0.1 K/UL — SIGNIFICANT CHANGE UP (ref 0–0.2)
BASOPHILS NFR BLD AUTO: 1.3 % — HIGH (ref 0–1)
BUN SERPL-MCNC: 24 MG/DL — HIGH (ref 10–20)
CALCIUM SERPL-MCNC: 9.3 MG/DL — SIGNIFICANT CHANGE UP (ref 8.4–10.5)
CHLORIDE SERPL-SCNC: 106 MMOL/L — SIGNIFICANT CHANGE UP (ref 98–110)
CO2 SERPL-SCNC: 24 MMOL/L — SIGNIFICANT CHANGE UP (ref 17–32)
CREAT SERPL-MCNC: 0.9 MG/DL — SIGNIFICANT CHANGE UP (ref 0.7–1.5)
EGFR: 101 ML/MIN/1.73M2 — SIGNIFICANT CHANGE UP
EOSINOPHIL # BLD AUTO: 0.33 K/UL — SIGNIFICANT CHANGE UP (ref 0–0.7)
EOSINOPHIL NFR BLD AUTO: 4.4 % — SIGNIFICANT CHANGE UP (ref 0–8)
ETHANOL SERPL-MCNC: <10 MG/DL — SIGNIFICANT CHANGE UP
GLUCOSE SERPL-MCNC: 123 MG/DL — HIGH (ref 70–99)
HCT VFR BLD CALC: 42.8 % — SIGNIFICANT CHANGE UP (ref 42–52)
HGB BLD-MCNC: 15.1 G/DL — SIGNIFICANT CHANGE UP (ref 14–18)
IMM GRANULOCYTES NFR BLD AUTO: 0.3 % — SIGNIFICANT CHANGE UP (ref 0.1–0.3)
LYMPHOCYTES # BLD AUTO: 2.3 K/UL — SIGNIFICANT CHANGE UP (ref 1.2–3.4)
LYMPHOCYTES # BLD AUTO: 30.4 % — SIGNIFICANT CHANGE UP (ref 20.5–51.1)
MCHC RBC-ENTMCNC: 32.3 PG — HIGH (ref 27–31)
MCHC RBC-ENTMCNC: 35.3 G/DL — SIGNIFICANT CHANGE UP (ref 32–37)
MCV RBC AUTO: 91.5 FL — SIGNIFICANT CHANGE UP (ref 80–94)
MONOCYTES # BLD AUTO: 0.67 K/UL — HIGH (ref 0.1–0.6)
MONOCYTES NFR BLD AUTO: 8.9 % — SIGNIFICANT CHANGE UP (ref 1.7–9.3)
NEUTROPHILS # BLD AUTO: 4.14 K/UL — SIGNIFICANT CHANGE UP (ref 1.4–6.5)
NEUTROPHILS NFR BLD AUTO: 54.7 % — SIGNIFICANT CHANGE UP (ref 42.2–75.2)
NRBC # BLD: 0 /100 WBCS — SIGNIFICANT CHANGE UP (ref 0–0)
PLATELET # BLD AUTO: 181 K/UL — SIGNIFICANT CHANGE UP (ref 130–400)
POTASSIUM SERPL-MCNC: 3.8 MMOL/L — SIGNIFICANT CHANGE UP (ref 3.5–5)
POTASSIUM SERPL-SCNC: 3.8 MMOL/L — SIGNIFICANT CHANGE UP (ref 3.5–5)
RBC # BLD: 4.68 M/UL — LOW (ref 4.7–6.1)
RBC # FLD: 12.9 % — SIGNIFICANT CHANGE UP (ref 11.5–14.5)
SALICYLATES SERPL-MCNC: <0.3 MG/DL — LOW (ref 4–30)
SODIUM SERPL-SCNC: 142 MMOL/L — SIGNIFICANT CHANGE UP (ref 135–146)
WBC # BLD: 7.56 K/UL — SIGNIFICANT CHANGE UP (ref 4.8–10.8)
WBC # FLD AUTO: 7.56 K/UL — SIGNIFICANT CHANGE UP (ref 4.8–10.8)

## 2023-03-18 PROCEDURE — 93010 ELECTROCARDIOGRAM REPORT: CPT

## 2023-03-18 PROCEDURE — 96372 THER/PROPH/DIAG INJ SC/IM: CPT

## 2023-03-18 PROCEDURE — 99285 EMERGENCY DEPT VISIT HI MDM: CPT

## 2023-03-18 PROCEDURE — 36415 COLL VENOUS BLD VENIPUNCTURE: CPT

## 2023-03-18 PROCEDURE — 85025 COMPLETE CBC W/AUTO DIFF WBC: CPT

## 2023-03-18 PROCEDURE — 80048 BASIC METABOLIC PNL TOTAL CA: CPT

## 2023-03-18 PROCEDURE — 80307 DRUG TEST PRSMV CHEM ANLYZR: CPT

## 2023-03-18 PROCEDURE — 93005 ELECTROCARDIOGRAM TRACING: CPT

## 2023-03-18 PROCEDURE — 73060 X-RAY EXAM OF HUMERUS: CPT | Mod: RT

## 2023-03-18 PROCEDURE — 90792 PSYCH DIAG EVAL W/MED SRVCS: CPT | Mod: GC

## 2023-03-18 PROCEDURE — 99053 MED SERV 10PM-8AM 24 HR FAC: CPT

## 2023-03-18 PROCEDURE — 99285 EMERGENCY DEPT VISIT HI MDM: CPT | Mod: 25

## 2023-03-18 PROCEDURE — 73060 X-RAY EXAM OF HUMERUS: CPT | Mod: 26,RT

## 2023-03-18 RX ORDER — GABAPENTIN 400 MG/1
1 CAPSULE ORAL
Qty: 42 | Refills: 0
Start: 2023-03-18 | End: 2023-03-31

## 2023-03-18 RX ORDER — GABAPENTIN 400 MG/1
300 CAPSULE ORAL ONCE
Refills: 0 | Status: COMPLETED | OUTPATIENT
Start: 2023-03-18 | End: 2023-03-18

## 2023-03-18 RX ORDER — KETOROLAC TROMETHAMINE 30 MG/ML
30 SYRINGE (ML) INJECTION ONCE
Refills: 0 | Status: DISCONTINUED | OUTPATIENT
Start: 2023-03-18 | End: 2023-03-18

## 2023-03-18 RX ORDER — HYDROXYZINE HCL 10 MG
1 TABLET ORAL
Qty: 42 | Refills: 0
Start: 2023-03-18 | End: 2023-03-31

## 2023-03-18 RX ADMIN — GABAPENTIN 300 MILLIGRAM(S): 400 CAPSULE ORAL at 11:22

## 2023-03-18 RX ADMIN — Medication 30 MILLIGRAM(S): at 09:57

## 2023-03-18 RX ADMIN — Medication 30 MILLIGRAM(S): at 06:22

## 2023-03-18 NOTE — ED BEHAVIORAL HEALTH ASSESSMENT NOTE - REFERRAL / APPOINTMENT DETAILS
Outpatient psychiatry referral at [facility] ([address] [place] [address], [phone]). Pain management referral.

## 2023-03-18 NOTE — ED BEHAVIORAL HEALTH ASSESSMENT NOTE - NSBHATTESTCOMMENTATTENDFT_PSY_A_CORE
Mr Moses is a 51y old man with a  history of reported schizoaffective disorder, bipolar type who presented to the ED for the management of  right arm pain. Psychiatry consulted for medication management of his psychotropic medications . On chart review, patient arm pain seems to be a chronic complaint, unclear if this has been evaluated .   Patient appears to have significant distress because of his pain in his arm  with associated anxiety. The reason for his medication request is unclear and patient seems to be having significant difficulty describing his symptoms. During his stay in the ED , aside from being anxious , patient is not observed to be internal preoccupied , is not observed to be responding to internal stimuli , and did not demonstrate any behaviors that would give any concerns for suicide . It seems that his primary preoccupation is his pain.   At this time, patient is not considered an imminent danger to himself or others and does not need inpatient psychiatric hospitalization. We recommend giving patient another dose of pain medication for acute relieve of his pain, after which he can be given a referral for outpatient pain management services . In addition, we recommend outpatient physical therapy services if patient is amenable . Patient can be restarted on gabapentin 300mg P.O TID for neuropathic pain and anxiety. Patient can also follow up at the [facility] as he was previously referred . Patient was agreeable to the plan.

## 2023-03-18 NOTE — ED ADULT TRIAGE NOTE - CHIEF COMPLAINT QUOTE
" I have pain in my right arm x few days and I want to talk to a mental health doctor, I haven't had my medications, I want prescriptions, I'm schizophrenic."
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
## 2023-03-18 NOTE — ED BEHAVIORAL HEALTH ASSESSMENT NOTE - RISK ASSESSMENT
Risk factors - potential underlying chronic pain, dysthymic mood, substance use    Protective factors - residential stability
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
## 2023-03-18 NOTE — ED PROVIDER NOTE - CARE PROVIDER_API CALL
Chintan Singh (MD)  Anesthesiology; Pain Medicine  [address] [place] [address]  Phone: [phone]  Fax: [phone]  Follow Up Time:

## 2023-03-18 NOTE — ED PROVIDER NOTE - CARE PROVIDERS DIRECT ADDRESSES
[email or web address]

## 2023-03-18 NOTE — ED BEHAVIORAL HEALTH ASSESSMENT NOTE - EMPLOYMENT
Anticoagulation: Return Visit      Plan   Reviewed the safe use of warfarin, factors that affect INR or bleeding risk, and the common signs and symptoms of a clot. Pt counseled to seek emergent care in case of excessive bleeding/bruising, or if sx of VTE/stroke occur. INR is 3.3 (2-3) down from the last visit of 4.2. Will give 1mg today instead of 2mg and then cpm. Venita will add another 8 oz of  Ensure or a total amount of 16oz/ wk  in 4 divided doses which will help bring her INR down. Will rtc in 2 weeks.   Unemployed

## 2023-03-18 NOTE — ED PROVIDER NOTE - PHYSICAL EXAMINATION
Constitutional: Well developed, well nourished. NAD  Head: Normocephalic, atraumatic.  Eyes: PERRL, EOMI.  ENT: No nasal discharge. Mucous membranes dry.  Neck: Supple. Painless ROM.  Cardiovascular:  Regular rate and rhythm.    Pulmonary: . Lungs clear to auscultation bilaterally.    Abdominal: Soft. Nondistended. No rebound, guarding, rigidity.  Extremities. Pelvis stable. No lower extremity edema, symmetric calves.  Skin: No rashes, cyanosis.  Neuro: AAOx3. No focal neurological deficits.  Psych: Normal mood. Normal affect.

## 2023-03-18 NOTE — ED BEHAVIORAL HEALTH ASSESSMENT NOTE - DESCRIPTION
Patient seen by psychiatry. Given Toradol at 6:00am. Per chart review chronic neck pain, mandibular fracture, asthma, copd States lives alone in apartment, born in [place], raised in , not working now

## 2023-03-18 NOTE — ED BEHAVIORAL HEALTH ASSESSMENT NOTE - PATIENT'S CHIEF COMPLAINT
"I feel alright...I thought I needed something, medication for...you know...I was on Seroquel before...for something"
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
## 2023-03-18 NOTE — ED BEHAVIORAL HEALTH ASSESSMENT NOTE - DIFFERENTIAL
Neurocognitive impairment vs. Schizoaffective disorder  R/o Intellectual disability  Psychiatry team today has suspicion that patient may not have a primary psychotic disorder, but rather presentation may be consistent with neurocognitive impairment and perhaps a component of intellectual disability. Neurocognitive impairment   R/o Developmental  disability  Psychiatry team today has suspicion that patient may not have a primary psychotic disorder, but rather presentation may be consistent with neurocognitive impairment and perhaps a component of intellectual disability.
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
## 2023-03-18 NOTE — ED PROVIDER NOTE - DIFFERENTIAL DIAGNOSIS
arm pain, no external signs of trauma, no areas of erythema to suggest cellulitis, no swelling to suggest dvt, no joint swelling or limitation in ROM to suggest joint effusion/septic arthritis/bursitis, will get screening xr and blood work Differential Diagnosis

## 2023-03-18 NOTE — ED PROVIDER NOTE - CLINICAL SUMMARY MEDICAL DECISION MAKING FREE TEXT BOX
52 male presents to ED for multiple complaints. Had screening labs imaging supportive care medications and reevaluation, had bedside psych consult for lack of outpatient followup,, plan discussed with patient, will discharge with outpatient management and return and follow up instructions. Right hi hardware

## 2023-03-18 NOTE — ED ADULT NURSE NOTE - OBJECTIVE STATEMENT
" I have pain in my right arm x few days and I want to talk to a mental health doctor, I haven't had my medications, I want prescriptions, I'm schizophrenic."  Pt denies recent injury to the right arm. He denies hearing voices and/or seeing things at this moment. PT denies SI/HI.

## 2023-03-18 NOTE — ED PROVIDER NOTE - ATTENDING APP SHARED VISIT CONTRIBUTION OF CARE
52 yold male to Ed Pmhx Schizophrenia, asthma, Ocd, substance abuse(cocaine, alcohol - last used tonight), no IVDU  pt presents for eval of R arm pain x 3 days. no trauma. pt does not recall sleeping on it unusually. pain to medial arm from axilla to elbow. no redness/swelling/fever. ROM intact to joint. no focal tenderness. no numbness/weakness.  pt also states he has not been taking his psych meds and would like to see psych.      vss  gen- NAD, aaox3  card-rrr  lungs-ctab, no wheezing or rhonchi  abd-sntnd, no guarding or rebound  neuro- full str/sensation, cn ii-xii grossly intact, normal coordination  msk- no focal redness/swelling to shoulder/elbow/wrist, no signs of IV dug injection to RUE, rad pulse 2+, str/sensation intact m/u/r

## 2023-03-18 NOTE — ED PROVIDER NOTE - PROGRESS NOTE DETAILS
pt observed sitting in chair in NAD, when approaching pt, he begins to state arm hurts. after engaging patient, he appears to be comfortable again in the chair, uses arm freely to  items. CO- pt endorsed to Dr. Martinez- pending labs, psych, reassess, dispo Patient seen bedside at shift change.  Complains of arm pain.  Has been given food, utilizes arm to feed himself without issues while under direct supervision.  He is minimally reasonable to be examined, seems focused on pain control.  Distal hand appears normal, no evidence of swelling or pallor.  Function is grossly normal.  X-ray images reviewed, unremarkable no acute emergent findings noted, chronic changes noted, likely an old fracture.  There were no old x-rays to review.  Patient is verbally threatening to staff, security has discussed with him regarding behavior.  We will get psych consult per patient's request for outpatient medication reinitiation.  Patient's behavior is not consistent with delirium, he is lucid, goal-directed, aware of his surroundings and grossly with intact memory. Pt evaluated by Psych, cleared for discharge with follow-up outpatient psych referral, dc on Vistaril and Gabapentin.
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
## 2023-03-18 NOTE — ED PROVIDER NOTE - CCCP TRG CHIEF CMPLNT
Elpidio Ball is a 38 y.o. female presents in office for    Chief Complaint   Patient presents with    New Patient    Anemia        Visit Vitals  /82 (BP 1 Location: Right arm, BP Patient Position: Sitting)   Pulse 66   Temp 97.3 °F (36.3 °C) (Oral)   Resp 18   Ht 5' 7\" (1.702 m)   Wt 84.8 kg (187 lb)   SpO2 100%   BMI 29.29 kg/m²         Health Maintenance Due   Topic Date Due    Pneumococcal 0-64 years (1 of 1 - PPSV23) 11/04/1980    FOOT EXAM Q1  11/04/1984    MICROALBUMIN Q1  11/04/1984    EYE EXAM RETINAL OR DILATED  11/04/1984    DTaP/Tdap/Td series (1 - Tdap) 11/04/1995    PAP AKA CERVICAL CYTOLOGY  11/04/1995    HEMOGLOBIN A1C Q6M  09/10/2016    LIPID PANEL Q1  03/10/2017    MEDICARE YEARLY EXAM  03/14/2018    Influenza Age 9 to Adult  08/01/2019         1. Have you been to the ER, urgent care clinic since your last visit? Hospitalized since your last visit? No    2. Have you seen or consulted any other health care providers outside of the [address] [place] [address] since your last visit? Include any pap smears or colon screening.  No     Learning Assessment 2/3/2016   PRIMARY LEARNER Patient   BARRIERS PRIMARY LEARNER NONE   CO-LEARNER CAREGIVER No   PRIMARY LANGUAGE ENGLISH   LEARNER PREFERENCE PRIMARY DEMONSTRATION   ANSWERED BY SELF   RELATIONSHIP SELF psych eval/arm pain/injury

## 2023-03-18 NOTE — ED PROVIDER NOTE - PATIENT PORTAL LINK FT
You can access the FollowMyHealth Patient Portal offered by [facility] by registering at the following website: [email or web address]. By joining [employer]’s FollowMyHealth portal, you will also be able to view your health information using other applications (apps) compatible with our system.

## 2023-03-18 NOTE — ED PROVIDER NOTE - NSFOLLOWUPINSTRUCTIONS_ED_ALL_ED_FT
- Outpatient psychiatry referral at [facility]   ([address] [place] [address], [phone]).

## 2023-03-18 NOTE — ED BEHAVIORAL HEALTH ASSESSMENT NOTE - MEDICATIONS (PRESCRIPTIONS, DIRECTIONS)
Discharge patient on Gabapentin 300mg TID for neuropathic pain. Discharge patient on PRN Vistaril 50mg q8hrs for anxiety.

## 2023-03-18 NOTE — ED PROVIDER NOTE - OBJECTIVE STATEMENT
52 yold male to Ed Pmhx Schizophrenia, asthma, Ocd, substance abuse(cocaine, alcohol - last used tonight) pt c/o right arm pain x 3 days without trauma or specific precipitating event; pt also requesting psych consult to restart his psych meds for schizophrenia; hasn't taken his meds in months because they didn't work; pt currently denies si/hi; pt denies fever, chills, cough, n/v, back pain;

## 2023-05-01 NOTE — ED ADULT NURSE NOTE - ISOLATION TYPE:
[FreeTextEntry1] : PHYSICAL EXAMINATION:\par Head: Normocephalic, atraumatic. Negative TA tenderness/prominence. \par \par Neck: Supple with full range of motion; nontender with negative bilateral Spurling's signs. \par \par Spine: Full range of motion; nontender. Negative straight leg raise maneuvers. \par \par Extremities: Non-tender. Atraumatic. Negative Tinel's signs. \par \par NEUROLOGICAL EXAMINATION: \par \par Mental Status: Patient is a good informant with intact orientation, attention, concentration, recent and remote memory. Language evaluation reveals no evidence of aphasia. Fund of knowledge is normal. \par \par Cranial Nerves Cranial Nerves: \par \par II, III, IV, VI: Pupils are equal, round, and reactive to light and accommodation. No evidence of afferent pupillary defect. Visual fields are full to confrontation. Eye movements are full without evidence of nystagmus or internuclear ophthalmoplegia. Funduscopic examination reveals sharp disc margins. \par \par V: Normal jaw movements. Normal facial sensation. \par \par VII: Normal facial motor testing. \par \par VIII: Grossly normal hearing bilaterally. \par \par IX, X: Palate moves symmetrically. No dysarthria. \par \par XI: Normal shoulder shrug and sternocleidomastoid power. \par \par XII: Tongue appears normal and protrudes in the midline. \par \par Motor: Normal bulk, tone, and power throughout. \par \par Muscle Stretch Reflexes (right/left): 2+ symmetrical. \par \par Plantar Responses: Flexor bilaterally. \par \par Coordination: Normal finger to nose and heel to shin testing, no truncal ataxia and no tremor. \par \par Sensation: Normal primary sensation. Normal double simultaneous stimulation. \par \par Gait and Station: Normal base, stride, and turning. Normal toe and heel walking. Normal tandem. Negative Romberg.\par  None

## 2023-07-29 ENCOUNTER — EMERGENCY (EMERGENCY)
Facility: HOSPITAL | Age: 53
LOS: 0 days | Discharge: HOME | End: 2023-07-29
Attending: EMERGENCY MEDICINE
Payer: MEDICAID

## 2023-07-29 VITALS
RESPIRATION RATE: 95 BRPM | SYSTOLIC BLOOD PRESSURE: 121 MMHG | HEART RATE: 84 BPM | TEMPERATURE: 98 F | DIASTOLIC BLOOD PRESSURE: 73 MMHG | OXYGEN SATURATION: 99 %

## 2023-07-29 VITALS
SYSTOLIC BLOOD PRESSURE: 125 MMHG | OXYGEN SATURATION: 99 % | TEMPERATURE: 98 F | HEART RATE: 55 BPM | RESPIRATION RATE: 16 BRPM | DIASTOLIC BLOOD PRESSURE: 76 MMHG

## 2023-07-29 DIAGNOSIS — F42.9 OBSESSIVE-COMPULSIVE DISORDER, UNSPECIFIED: ICD-10-CM
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
## 2023-07-29 DIAGNOSIS — J45.909 UNSPECIFIED ASTHMA, UNCOMPLICATED: ICD-10-CM

## 2023-07-29 DIAGNOSIS — S09.93XA UNSPECIFIED INJURY OF FACE, INITIAL ENCOUNTER: ICD-10-CM

## 2023-07-29 DIAGNOSIS — Z91.013 ALLERGY TO SEAFOOD: ICD-10-CM

## 2023-07-29 DIAGNOSIS — S01.511A LACERATION WITHOUT FOREIGN BODY OF LIP, INITIAL ENCOUNTER: ICD-10-CM

## 2023-07-29 DIAGNOSIS — Z98.890 OTHER SPECIFIED POSTPROCEDURAL STATES: Chronic | ICD-10-CM

## 2023-07-29 DIAGNOSIS — F20.9 SCHIZOPHRENIA, UNSPECIFIED: ICD-10-CM
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
## 2023-07-29 DIAGNOSIS — Z91.012 ALLERGY TO EGGS: ICD-10-CM

## 2023-07-29 DIAGNOSIS — Z88.8 ALLERGY STATUS TO OTHER DRUGS, MEDICAMENTS AND BIOLOGICAL SUBSTANCES: ICD-10-CM

## 2023-07-29 LAB
ALBUMIN SERPL ELPH-MCNC: 4.3 G/DL — SIGNIFICANT CHANGE UP (ref 3.5–5.2)
ALP SERPL-CCNC: 58 U/L — SIGNIFICANT CHANGE UP (ref 30–115)
ALT FLD-CCNC: 12 U/L — SIGNIFICANT CHANGE UP (ref 0–41)
ANION GAP SERPL CALC-SCNC: 14 MMOL/L — SIGNIFICANT CHANGE UP (ref 7–14)
APTT BLD: 34 SEC — SIGNIFICANT CHANGE UP (ref 27–39.2)
AST SERPL-CCNC: 21 U/L — SIGNIFICANT CHANGE UP (ref 0–41)
BASOPHILS # BLD AUTO: 0.06 K/UL — SIGNIFICANT CHANGE UP (ref 0–0.2)
BASOPHILS NFR BLD AUTO: 0.6 % — SIGNIFICANT CHANGE UP (ref 0–1)
BILIRUB SERPL-MCNC: 1 MG/DL — SIGNIFICANT CHANGE UP (ref 0.2–1.2)
BUN SERPL-MCNC: 15 MG/DL — SIGNIFICANT CHANGE UP (ref 10–20)
CALCIUM SERPL-MCNC: 9.4 MG/DL — SIGNIFICANT CHANGE UP (ref 8.4–10.5)
CHLORIDE SERPL-SCNC: 104 MMOL/L — SIGNIFICANT CHANGE UP (ref 98–110)
CO2 SERPL-SCNC: 21 MMOL/L — SIGNIFICANT CHANGE UP (ref 17–32)
CREAT SERPL-MCNC: 0.9 MG/DL — SIGNIFICANT CHANGE UP (ref 0.7–1.5)
EGFR: 101 ML/MIN/1.73M2 — SIGNIFICANT CHANGE UP
EOSINOPHIL # BLD AUTO: 0.28 K/UL — SIGNIFICANT CHANGE UP (ref 0–0.7)
EOSINOPHIL NFR BLD AUTO: 3 % — SIGNIFICANT CHANGE UP (ref 0–8)
GLUCOSE SERPL-MCNC: 84 MG/DL — SIGNIFICANT CHANGE UP (ref 70–99)
HCT VFR BLD CALC: 42.1 % — SIGNIFICANT CHANGE UP (ref 42–52)
HGB BLD-MCNC: 15.2 G/DL — SIGNIFICANT CHANGE UP (ref 14–18)
IMM GRANULOCYTES NFR BLD AUTO: 0.3 % — SIGNIFICANT CHANGE UP (ref 0.1–0.3)
INR BLD: 0.92 RATIO — SIGNIFICANT CHANGE UP (ref 0.65–1.3)
LYMPHOCYTES # BLD AUTO: 1.81 K/UL — SIGNIFICANT CHANGE UP (ref 1.2–3.4)
LYMPHOCYTES # BLD AUTO: 19.2 % — LOW (ref 20.5–51.1)
MCHC RBC-ENTMCNC: 33 PG — HIGH (ref 27–31)
MCHC RBC-ENTMCNC: 36.1 G/DL — SIGNIFICANT CHANGE UP (ref 32–37)
MCV RBC AUTO: 91.5 FL — SIGNIFICANT CHANGE UP (ref 80–94)
MONOCYTES # BLD AUTO: 0.83 K/UL — HIGH (ref 0.1–0.6)
MONOCYTES NFR BLD AUTO: 8.8 % — SIGNIFICANT CHANGE UP (ref 1.7–9.3)
NEUTROPHILS # BLD AUTO: 6.42 K/UL — SIGNIFICANT CHANGE UP (ref 1.4–6.5)
NEUTROPHILS NFR BLD AUTO: 68.1 % — SIGNIFICANT CHANGE UP (ref 42.2–75.2)
NRBC # BLD: 0 /100 WBCS — SIGNIFICANT CHANGE UP (ref 0–0)
PLATELET # BLD AUTO: 129 K/UL — LOW (ref 130–400)
PMV BLD: 11.5 FL — HIGH (ref 7.4–10.4)
POTASSIUM SERPL-MCNC: 3.9 MMOL/L — SIGNIFICANT CHANGE UP (ref 3.5–5)
POTASSIUM SERPL-SCNC: 3.9 MMOL/L — SIGNIFICANT CHANGE UP (ref 3.5–5)
PROT SERPL-MCNC: 6.7 G/DL — SIGNIFICANT CHANGE UP (ref 6–8)
PROTHROM AB SERPL-ACNC: 10.5 SEC — SIGNIFICANT CHANGE UP (ref 9.95–12.87)
RBC # BLD: 4.6 M/UL — LOW (ref 4.7–6.1)
RBC # FLD: 12.7 % — SIGNIFICANT CHANGE UP (ref 11.5–14.5)
SODIUM SERPL-SCNC: 139 MMOL/L — SIGNIFICANT CHANGE UP (ref 135–146)
WBC # BLD: 9.43 K/UL — SIGNIFICANT CHANGE UP (ref 4.8–10.8)
WBC # FLD AUTO: 9.43 K/UL — SIGNIFICANT CHANGE UP (ref 4.8–10.8)

## 2023-07-29 PROCEDURE — 71045 X-RAY EXAM CHEST 1 VIEW: CPT

## 2023-07-29 PROCEDURE — 99285 EMERGENCY DEPT VISIT HI MDM: CPT

## 2023-07-29 PROCEDURE — 70450 CT HEAD/BRAIN W/O DYE: CPT | Mod: 26,MA

## 2023-07-29 PROCEDURE — 72170 X-RAY EXAM OF PELVIS: CPT | Mod: 26

## 2023-07-29 PROCEDURE — 85025 COMPLETE CBC W/AUTO DIFF WBC: CPT

## 2023-07-29 PROCEDURE — 71260 CT THORAX DX C+: CPT | Mod: 26,MA

## 2023-07-29 PROCEDURE — 85730 THROMBOPLASTIN TIME PARTIAL: CPT

## 2023-07-29 PROCEDURE — 80053 COMPREHEN METABOLIC PANEL: CPT

## 2023-07-29 PROCEDURE — 12011 RPR F/E/E/N/L/M 2.5 CM/<: CPT

## 2023-07-29 PROCEDURE — 72125 CT NECK SPINE W/O DYE: CPT | Mod: 26,MA

## 2023-07-29 PROCEDURE — 70486 CT MAXILLOFACIAL W/O DYE: CPT | Mod: MA

## 2023-07-29 PROCEDURE — 99284 EMERGENCY DEPT VISIT MOD MDM: CPT | Mod: 25

## 2023-07-29 PROCEDURE — 72170 X-RAY EXAM OF PELVIS: CPT

## 2023-07-29 PROCEDURE — 85610 PROTHROMBIN TIME: CPT

## 2023-07-29 PROCEDURE — 99053 MED SERV 10PM-8AM 24 HR FAC: CPT

## 2023-07-29 PROCEDURE — 74177 CT ABD & PELVIS W/CONTRAST: CPT | Mod: 26,MA

## 2023-07-29 PROCEDURE — 71045 X-RAY EXAM CHEST 1 VIEW: CPT | Mod: 26

## 2023-07-29 PROCEDURE — 70450 CT HEAD/BRAIN W/O DYE: CPT | Mod: MA

## 2023-07-29 PROCEDURE — 82962 GLUCOSE BLOOD TEST: CPT

## 2023-07-29 PROCEDURE — 71260 CT THORAX DX C+: CPT | Mod: MA

## 2023-07-29 PROCEDURE — 74177 CT ABD & PELVIS W/CONTRAST: CPT | Mod: MA

## 2023-07-29 PROCEDURE — 72125 CT NECK SPINE W/O DYE: CPT | Mod: MA

## 2023-07-29 PROCEDURE — 70486 CT MAXILLOFACIAL W/O DYE: CPT | Mod: 26,MA

## 2023-07-29 PROCEDURE — 36415 COLL VENOUS BLD VENIPUNCTURE: CPT

## 2023-07-29 RX ORDER — CHLORHEXIDINE GLUCONATE 213 G/1000ML
15 SOLUTION TOPICAL
Qty: 1 | Refills: 0
Start: 2023-07-29 | End: 2023-08-04

## 2023-07-29 NOTE — ED PROVIDER NOTE - PATIENT PORTAL LINK FT
You can access the FollowMyHealth Patient Portal offered by [facility] by registering at the following website: [email or web address]. By joining [facility]’s FollowMyHealth portal, you will also be able to view your health information using other applications (apps) compatible with our system.

## 2023-07-29 NOTE — ED PROVIDER NOTE - OBJECTIVE STATEMENT
52-year-old male with history of schizophrenia, asthma, substance abuse, alcohol abuse presents to ED complaining of facial injury and lip laceration after fall.  Patient states does not remember what happened. 52-year-old male with history of schizophrenia, asthma, substance abuse, alcohol abuse presents to ED complaining of facial injury and lip laceration after fall.  Patient states does not remember what happened. last tetanus 2022.
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
## 2023-07-29 NOTE — ED PROVIDER NOTE - NSFOLLOWUPCLINICS_GEN_ALL_ED_FT
[facility]  Dental  [address] [place] [id number]  Phone: [phone]  Fax:

## 2023-07-29 NOTE — ED PROVIDER NOTE - CARE PLAN
1 Principal Discharge DX:	Lip laceration  Secondary Diagnosis:	Facial injury, initial encounter  Secondary Diagnosis:	Dental injury

## 2023-07-29 NOTE — ED PROVIDER NOTE - NSFOLLOWUPCLINICSTOKEN_GEN_ALL_ED_FT
Informed pt of your response, he will start back on crestor 40 mg daily, pt states he has about 2 months worth of medication, advised to take those and call us when he needs arefill.   [id number]: || ||00\01||False;
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
## 2023-07-29 NOTE — ED ADULT NURSE NOTE - NSFALLHARMRISKINTERV_ED_ALL_ED
Assistance OOB with selected safe patient handling equipment if applicable/Assistance with ambulation/Communicate risk of Fall with Harm to all staff, patient, and family/Monitor gait and stability/Monitor for mental status changes and reorient to person, place, and time, as needed/Provide visual cue: red socks, yellow wristband, yellow gown, etc/Reinforce activity limits and safety measures with patient and family/Toileting schedule using arm’s reach rule for commode and bathroom/Use of alarms - bed, stretcher, chair and/or video monitoring/Bed in lowest position, wheels locked, appropriate side rails in place/Call bell, personal items and telephone in reach/Instruct patient to call for assistance before getting out of bed/chair/stretcher/Non-slip footwear applied when patient is off stretcher/[place] to call system/Physically safe environment - no spills, clutter or unnecessary equipment/Purposeful Proactive Rounding/Room/bathroom lighting operational, light cord in reach

## 2023-07-29 NOTE — ED PROVIDER NOTE - NSFOLLOWUPINSTRUCTIONS_ED_ALL_ED_FT
Laceration    A laceration is a cut that goes through all of the layers of the skin and into the tissue that is right under the skin. Some lacerations heal on their own. Others need to be closed with skin adhesive strips, skin glue, stitches (sutures), or staples. Proper laceration care minimizes the risk of infection and helps the laceration to heal better.  If non-absorbable stitches or staples have been placed, they must be taken out within the time frame instructed by your healthcare provider.    SEEK IMMEDIATE MEDICAL CARE IF YOU HAVE ANY OF THE FOLLOWING SYMPTOMS: swelling around the wound, worsening pain, drainage from the wound, red streaking going away from your wound, inability to move finger or toe near the laceration, or discoloration of skin near the laceration.    Dental Pain    Dental pain (toothache) may be caused by many things including tooth decay (cavities or caries), abscess or infection, injury, or the reason may be unknown. Your pain may only occur when you are chewing, are exposed to hot or cold temperature, are eating or drinking sugary foods or beverages, or your pain may be constant. If you were prescribed an antibiotic medicine, finish all of it even if you start to feel better. Rinsing your mouth with salt water or applying ice to the painful area of your face may help with the pain.    SEEK IMMEDIATE MEDICAL CARE IF YOU HAVE THE FOLLOWING SYMPTOMS: unable to open mouth, trouble breathing or swallowing, fever, or swelling of the face, neck or jaw.

## 2023-07-29 NOTE — ED PROVIDER NOTE - CLINICAL SUMMARY MEDICAL DECISION MAKING FREE TEXT BOX
Patient presented with obvious EtOH intoxication.  Initially, patient noncompliant with physical exam, but evidence of trauma noted to the facial region with abrasions as well as what appears to be a lip laceration.  Patient denies any active complaints, but is not cooperative with history taking.  On arrival, patient otherwise afebrile, hemodynamically stable, appears to be moving all extremities although is not following commands due to noncooperation.  Given unclear circumstances surrounding trauma, obtained pan scan which showed dental abnormalities as documented, but no other acute traumatic injury.  Obtained labs which were grossly unremarkable including no significant leukocytosis, anemia, signs of dehydration/JANICE, transaminitis or significant electrolyte abnormalities.  Given dental abnormalities, consulted dental who evaluated the patient in the ED and ultimately sutured oral laceration.  Recommending outpatient follow-up and p.o. antibiotics.  Patient monitored until sobriety at which point patient ambulatory with steady gait, tolerating p.o., AAOx3, no SI/HI/hallucinations, no further active complaints.  Given the above, will discharge home with outpatient follow up. Patient agreeable with plan. Agrees to return to ED for any new or worsening symptoms.

## 2023-07-29 NOTE — ED PROVIDER NOTE - PHYSICAL EXAMINATION
Vital Signs: I have reviewed the initial vital signs.  Constitutional: NAD, well-nourished, appears stated age, no acute distress. +AOB  HEENT: Airway patent, moist MM, +frontal dental injury/ tooth deformity, right upper lip laceration. EOMI, PERRLA.  CV: regular rate, regular rhythm, well-perfused extremities, 2+ b/l DP and radial pulses equal.  Lungs: BCTA, no increased WOB.  ABD: NTND, no guarding or rebound, no pulsatile mass, no hernias.   MSK: Neck supple, nontender, nl ROM, no stepoff. Chest nontender. Back nontender. Ext nontender, nl rom, no deformity.   INTEG: +right upper lip laceration.  NEURO: A&Ox3, uncooperative

## 2023-09-07 ENCOUNTER — EMERGENCY (EMERGENCY)
Facility: HOSPITAL | Age: 53
LOS: 0 days | Discharge: HOME | End: 2023-09-07
Attending: STUDENT IN AN ORGANIZED HEALTH CARE EDUCATION/TRAINING PROGRAM
Payer: MEDICAID

## 2023-09-07 VITALS
RESPIRATION RATE: 16 BRPM | DIASTOLIC BLOOD PRESSURE: 67 MMHG | HEART RATE: 68 BPM | OXYGEN SATURATION: 96 % | SYSTOLIC BLOOD PRESSURE: 107 MMHG | TEMPERATURE: 98 F | HEIGHT: 60 IN
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
## 2023-09-07 DIAGNOSIS — Z91.012 ALLERGY TO EGGS: ICD-10-CM

## 2023-09-07 DIAGNOSIS — F20.9 SCHIZOPHRENIA, UNSPECIFIED: ICD-10-CM

## 2023-09-07 DIAGNOSIS — Z98.890 OTHER SPECIFIED POSTPROCEDURAL STATES: Chronic | ICD-10-CM

## 2023-09-07 DIAGNOSIS — F17.200 NICOTINE DEPENDENCE, UNSPECIFIED, UNCOMPLICATED: ICD-10-CM

## 2023-09-07 DIAGNOSIS — Z98.890 OTHER SPECIFIED POSTPROCEDURAL STATES: ICD-10-CM
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
## 2023-09-07 DIAGNOSIS — K08.89 OTHER SPECIFIED DISORDERS OF TEETH AND SUPPORTING STRUCTURES: ICD-10-CM

## 2023-09-07 DIAGNOSIS — Z88.6 ALLERGY STATUS TO ANALGESIC AGENT: ICD-10-CM

## 2023-09-07 DIAGNOSIS — Z86.59 PERSONAL HISTORY OF OTHER MENTAL AND BEHAVIORAL DISORDERS: ICD-10-CM

## 2023-09-07 DIAGNOSIS — Z91.013 ALLERGY TO SEAFOOD: ICD-10-CM

## 2023-09-07 DIAGNOSIS — J45.909 UNSPECIFIED ASTHMA, UNCOMPLICATED: ICD-10-CM

## 2023-09-07 PROCEDURE — 99282 EMERGENCY DEPT VISIT SF MDM: CPT

## 2023-09-07 PROCEDURE — 99283 EMERGENCY DEPT VISIT LOW MDM: CPT

## 2023-09-07 RX ORDER — IBUPROFEN 200 MG
400 TABLET ORAL ONCE
Refills: 0 | Status: COMPLETED | OUTPATIENT
Start: 2023-09-07 | End: 2023-09-07

## 2023-09-07 RX ADMIN — Medication 400 MILLIGRAM(S): at 11:43

## 2023-09-07 NOTE — ED PROVIDER NOTE - CHIEF COMPLAINT
The patient is a 52y Male complaining of dental pain/injury. The patient is a 52y Male complarewteatining of dental pain/injury.

## 2023-09-07 NOTE — ED PROVIDER NOTE - CLINICAL SUMMARY MEDICAL DECISION MAKING FREE TEXT BOX
52-year-old male with history of schizophrenia, asthma, substance abuse, alcohol abuse presents to ED complaining of gum pain progressively worsening since last visit. Had a fall on July 29, was seen in our ER. CT obtained which was notable only for a loose tooth, no fracture. Since then, notes worsening gum pain. No fevers, no trismus, no voice change, no SOB, no neck pain, no CP, no N/V. Tolerates PO. Didn't try any medications at home.     On exam, poor dentition. Tooth #8 is loose. No visible abscess, no signs of PTA or RPA. Oral laceration is s/p repair with absorbable sutures and is well-healed. Neck is supple, no trismus, no stridor, no pooling of secretions, no voice change.    Recommended outpt dental follow up. Given motrin for pain here. All questions answered. Return precautions discussed

## 2023-09-07 NOTE — ED PROVIDER NOTE - OBJECTIVE STATEMENT
51 yo M c/o dental pain x  1 month. Patient states he fell and has had pain to his upper teeth since.

## 2023-09-07 NOTE — ED ADULT NURSE NOTE - NSFALLUNIVINTERV_ED_ALL_ED
Bed/Stretcher in lowest position, wheels locked, appropriate side rails in place/Call bell, personal items and telephone in reach/Instruct patient to call for assistance before getting out of bed/chair/stretcher/Non-slip footwear applied when patient is off stretcher/[place] to call system/Physically safe environment - no spills, clutter or unnecessary equipment/Purposeful proactive rounding/Room/bathroom lighting operational, light cord in reach

## 2023-09-07 NOTE — ED PROVIDER NOTE - NSFOLLOWUPCLINICS_GEN_ALL_ED_FT
[facility]  Dental  [address] [place] [address]  Phone: [phone]  Fax:   Follow Up Time: 1-3 Days

## 2023-09-07 NOTE — ED PROVIDER NOTE - PHYSICAL EXAMINATION
Physical Exam    Vital Signs: I have reviewed the initial vital signs.  Constitutional: well-nourished, appears stated age, no acute distress  Dental: +pain and laxity to tooth #8  Neuro: AOx3, No focal deficits noted

## 2023-09-07 NOTE — ED PROVIDER NOTE - PATIENT PORTAL LINK FT
You can access the FollowMyHealth Patient Portal offered by [facility] by registering at the following website: [email or web address]. By joining [facility]’s FollowMyHealth portal, you will also be able to view your health information using other applications (apps) compatible with our system.

## 2023-10-24 ENCOUNTER — EMERGENCY (EMERGENCY)
Facility: HOSPITAL | Age: 53
LOS: 0 days | Discharge: HOME | End: 2023-10-24
Attending: EMERGENCY MEDICINE
Payer: MEDICAID

## 2023-10-24 VITALS
DIASTOLIC BLOOD PRESSURE: 66 MMHG | WEIGHT: 169.98 LBS | SYSTOLIC BLOOD PRESSURE: 99 MMHG | HEIGHT: 60 IN | RESPIRATION RATE: 18 BRPM | HEART RATE: 70 BPM | TEMPERATURE: 98 F | OXYGEN SATURATION: 99 %

## 2023-10-24 VITALS
RESPIRATION RATE: 18 BRPM | SYSTOLIC BLOOD PRESSURE: 107 MMHG | TEMPERATURE: 98 F | HEART RATE: 77 BPM | OXYGEN SATURATION: 98 % | DIASTOLIC BLOOD PRESSURE: 63 MMHG

## 2023-10-24 DIAGNOSIS — F17.200 NICOTINE DEPENDENCE, UNSPECIFIED, UNCOMPLICATED: ICD-10-CM

## 2023-10-24 DIAGNOSIS — R20.2 PARESTHESIA OF SKIN: ICD-10-CM

## 2023-10-24 DIAGNOSIS — F19.99 OTHER PSYCHOACTIVE SUBSTANCE USE, UNSPECIFIED WITH UNSPECIFIED PSYCHOACTIVE SUBSTANCE-INDUCED DISORDER: ICD-10-CM

## 2023-10-24 DIAGNOSIS — Z91.013 ALLERGY TO SEAFOOD: ICD-10-CM

## 2023-10-24 DIAGNOSIS — Z91.012 ALLERGY TO EGGS: ICD-10-CM

## 2023-10-24 DIAGNOSIS — R47.9 UNSPECIFIED SPEECH DISTURBANCES: ICD-10-CM

## 2023-10-24 DIAGNOSIS — F20.9 SCHIZOPHRENIA, UNSPECIFIED: ICD-10-CM

## 2023-10-24 DIAGNOSIS — Z98.890 OTHER SPECIFIED POSTPROCEDURAL STATES: Chronic | ICD-10-CM

## 2023-10-24 DIAGNOSIS — R47.01 APHASIA: ICD-10-CM

## 2023-10-24 DIAGNOSIS — R47.1 DYSARTHRIA AND ANARTHRIA: ICD-10-CM

## 2023-10-24 LAB
ALBUMIN SERPL ELPH-MCNC: 4.5 G/DL — SIGNIFICANT CHANGE UP (ref 3.5–5.2)
ALBUMIN SERPL ELPH-MCNC: 4.5 G/DL — SIGNIFICANT CHANGE UP (ref 3.5–5.2)
ALP SERPL-CCNC: 61 U/L — SIGNIFICANT CHANGE UP (ref 30–115)
ALP SERPL-CCNC: 61 U/L — SIGNIFICANT CHANGE UP (ref 30–115)
ALT FLD-CCNC: 10 U/L — SIGNIFICANT CHANGE UP (ref 0–41)
ALT FLD-CCNC: 10 U/L — SIGNIFICANT CHANGE UP (ref 0–41)
ANION GAP SERPL CALC-SCNC: 11 MMOL/L — SIGNIFICANT CHANGE UP (ref 7–14)
ANION GAP SERPL CALC-SCNC: 11 MMOL/L — SIGNIFICANT CHANGE UP (ref 7–14)
APTT BLD: 32.4 SEC — SIGNIFICANT CHANGE UP (ref 27–39.2)
APTT BLD: 32.4 SEC — SIGNIFICANT CHANGE UP (ref 27–39.2)
AST SERPL-CCNC: 22 U/L — SIGNIFICANT CHANGE UP (ref 0–41)
AST SERPL-CCNC: 22 U/L — SIGNIFICANT CHANGE UP (ref 0–41)
BASOPHILS # BLD AUTO: 0.1 K/UL — SIGNIFICANT CHANGE UP (ref 0–0.2)
BASOPHILS # BLD AUTO: 0.1 K/UL — SIGNIFICANT CHANGE UP (ref 0–0.2)
BASOPHILS NFR BLD AUTO: 1.1 % — HIGH (ref 0–1)
BASOPHILS NFR BLD AUTO: 1.1 % — HIGH (ref 0–1)
BILIRUB SERPL-MCNC: 0.5 MG/DL — SIGNIFICANT CHANGE UP (ref 0.2–1.2)
BILIRUB SERPL-MCNC: 0.5 MG/DL — SIGNIFICANT CHANGE UP (ref 0.2–1.2)
BUN SERPL-MCNC: 17 MG/DL — SIGNIFICANT CHANGE UP (ref 10–20)
BUN SERPL-MCNC: 17 MG/DL — SIGNIFICANT CHANGE UP (ref 10–20)
CALCIUM SERPL-MCNC: 9.3 MG/DL — SIGNIFICANT CHANGE UP (ref 8.4–10.5)
CALCIUM SERPL-MCNC: 9.3 MG/DL — SIGNIFICANT CHANGE UP (ref 8.4–10.5)
CHLORIDE SERPL-SCNC: 103 MMOL/L — SIGNIFICANT CHANGE UP (ref 98–110)
CHLORIDE SERPL-SCNC: 103 MMOL/L — SIGNIFICANT CHANGE UP (ref 98–110)
CO2 SERPL-SCNC: 27 MMOL/L — SIGNIFICANT CHANGE UP (ref 17–32)
CO2 SERPL-SCNC: 27 MMOL/L — SIGNIFICANT CHANGE UP (ref 17–32)
CREAT SERPL-MCNC: 0.9 MG/DL — SIGNIFICANT CHANGE UP (ref 0.7–1.5)
CREAT SERPL-MCNC: 0.9 MG/DL — SIGNIFICANT CHANGE UP (ref 0.7–1.5)
EGFR: 101 ML/MIN/1.73M2 — SIGNIFICANT CHANGE UP
EGFR: 101 ML/MIN/1.73M2 — SIGNIFICANT CHANGE UP
EOSINOPHIL # BLD AUTO: 0.75 K/UL — HIGH (ref 0–0.7)
EOSINOPHIL # BLD AUTO: 0.75 K/UL — HIGH (ref 0–0.7)
EOSINOPHIL NFR BLD AUTO: 8.4 % — HIGH (ref 0–8)
EOSINOPHIL NFR BLD AUTO: 8.4 % — HIGH (ref 0–8)
ETHANOL SERPL-MCNC: 46 MG/DL — HIGH
ETHANOL SERPL-MCNC: 46 MG/DL — HIGH
FOLATE SERPL-MCNC: 10.9 NG/ML — SIGNIFICANT CHANGE UP
FOLATE SERPL-MCNC: 10.9 NG/ML — SIGNIFICANT CHANGE UP
GLUCOSE SERPL-MCNC: 76 MG/DL — SIGNIFICANT CHANGE UP (ref 70–99)
GLUCOSE SERPL-MCNC: 76 MG/DL — SIGNIFICANT CHANGE UP (ref 70–99)
HCT VFR BLD CALC: 43.4 % — SIGNIFICANT CHANGE UP (ref 42–52)
HCT VFR BLD CALC: 43.4 % — SIGNIFICANT CHANGE UP (ref 42–52)
HGB BLD-MCNC: 15 G/DL — SIGNIFICANT CHANGE UP (ref 14–18)
HGB BLD-MCNC: 15 G/DL — SIGNIFICANT CHANGE UP (ref 14–18)
IMM GRANULOCYTES NFR BLD AUTO: 0.2 % — SIGNIFICANT CHANGE UP (ref 0.1–0.3)
IMM GRANULOCYTES NFR BLD AUTO: 0.2 % — SIGNIFICANT CHANGE UP (ref 0.1–0.3)
INR BLD: 0.89 RATIO — SIGNIFICANT CHANGE UP (ref 0.65–1.3)
INR BLD: 0.89 RATIO — SIGNIFICANT CHANGE UP (ref 0.65–1.3)
LYMPHOCYTES # BLD AUTO: 2.5 K/UL — SIGNIFICANT CHANGE UP (ref 1.2–3.4)
LYMPHOCYTES # BLD AUTO: 2.5 K/UL — SIGNIFICANT CHANGE UP (ref 1.2–3.4)
LYMPHOCYTES # BLD AUTO: 28 % — SIGNIFICANT CHANGE UP (ref 20.5–51.1)
LYMPHOCYTES # BLD AUTO: 28 % — SIGNIFICANT CHANGE UP (ref 20.5–51.1)
MCHC RBC-ENTMCNC: 32.1 PG — HIGH (ref 27–31)
MCHC RBC-ENTMCNC: 32.1 PG — HIGH (ref 27–31)
MCHC RBC-ENTMCNC: 34.6 G/DL — SIGNIFICANT CHANGE UP (ref 32–37)
MCHC RBC-ENTMCNC: 34.6 G/DL — SIGNIFICANT CHANGE UP (ref 32–37)
MCV RBC AUTO: 92.9 FL — SIGNIFICANT CHANGE UP (ref 80–94)
MCV RBC AUTO: 92.9 FL — SIGNIFICANT CHANGE UP (ref 80–94)
MONOCYTES # BLD AUTO: 0.65 K/UL — HIGH (ref 0.1–0.6)
MONOCYTES # BLD AUTO: 0.65 K/UL — HIGH (ref 0.1–0.6)
MONOCYTES NFR BLD AUTO: 7.3 % — SIGNIFICANT CHANGE UP (ref 1.7–9.3)
MONOCYTES NFR BLD AUTO: 7.3 % — SIGNIFICANT CHANGE UP (ref 1.7–9.3)
NEUTROPHILS # BLD AUTO: 4.91 K/UL — SIGNIFICANT CHANGE UP (ref 1.4–6.5)
NEUTROPHILS # BLD AUTO: 4.91 K/UL — SIGNIFICANT CHANGE UP (ref 1.4–6.5)
NEUTROPHILS NFR BLD AUTO: 55 % — SIGNIFICANT CHANGE UP (ref 42.2–75.2)
NEUTROPHILS NFR BLD AUTO: 55 % — SIGNIFICANT CHANGE UP (ref 42.2–75.2)
NRBC # BLD: 0 /100 WBCS — SIGNIFICANT CHANGE UP (ref 0–0)
NRBC # BLD: 0 /100 WBCS — SIGNIFICANT CHANGE UP (ref 0–0)
PLATELET # BLD AUTO: 149 K/UL — SIGNIFICANT CHANGE UP (ref 130–400)
PLATELET # BLD AUTO: 149 K/UL — SIGNIFICANT CHANGE UP (ref 130–400)
PMV BLD: 11.9 FL — HIGH (ref 7.4–10.4)
PMV BLD: 11.9 FL — HIGH (ref 7.4–10.4)
POTASSIUM SERPL-MCNC: 4 MMOL/L — SIGNIFICANT CHANGE UP (ref 3.5–5)
POTASSIUM SERPL-MCNC: 4 MMOL/L — SIGNIFICANT CHANGE UP (ref 3.5–5)
POTASSIUM SERPL-SCNC: 4 MMOL/L — SIGNIFICANT CHANGE UP (ref 3.5–5)
POTASSIUM SERPL-SCNC: 4 MMOL/L — SIGNIFICANT CHANGE UP (ref 3.5–5)
PROT SERPL-MCNC: 6.8 G/DL — SIGNIFICANT CHANGE UP (ref 6–8)
PROT SERPL-MCNC: 6.8 G/DL — SIGNIFICANT CHANGE UP (ref 6–8)
PROTHROM AB SERPL-ACNC: 10.1 SEC — SIGNIFICANT CHANGE UP (ref 9.95–12.87)
PROTHROM AB SERPL-ACNC: 10.1 SEC — SIGNIFICANT CHANGE UP (ref 9.95–12.87)
RBC # BLD: 4.67 M/UL — LOW (ref 4.7–6.1)
RBC # BLD: 4.67 M/UL — LOW (ref 4.7–6.1)
RBC # FLD: 11.9 % — SIGNIFICANT CHANGE UP (ref 11.5–14.5)
RBC # FLD: 11.9 % — SIGNIFICANT CHANGE UP (ref 11.5–14.5)
SODIUM SERPL-SCNC: 141 MMOL/L — SIGNIFICANT CHANGE UP (ref 135–146)
SODIUM SERPL-SCNC: 141 MMOL/L — SIGNIFICANT CHANGE UP (ref 135–146)
TROPONIN T SERPL-MCNC: <0.01 NG/ML — SIGNIFICANT CHANGE UP
TROPONIN T SERPL-MCNC: <0.01 NG/ML — SIGNIFICANT CHANGE UP
VIT B12 SERPL-MCNC: 347 PG/ML — SIGNIFICANT CHANGE UP (ref 232–1245)
VIT B12 SERPL-MCNC: 347 PG/ML — SIGNIFICANT CHANGE UP (ref 232–1245)
WBC # BLD: 8.93 K/UL — SIGNIFICANT CHANGE UP (ref 4.8–10.8)
WBC # BLD: 8.93 K/UL — SIGNIFICANT CHANGE UP (ref 4.8–10.8)
WBC # FLD AUTO: 8.93 K/UL — SIGNIFICANT CHANGE UP (ref 4.8–10.8)
WBC # FLD AUTO: 8.93 K/UL — SIGNIFICANT CHANGE UP (ref 4.8–10.8)

## 2023-10-24 PROCEDURE — 99236 HOSP IP/OBS SAME DATE HI 85: CPT

## 2023-10-24 PROCEDURE — 70450 CT HEAD/BRAIN W/O DYE: CPT | Mod: 26,MA,77

## 2023-10-24 PROCEDURE — 73090 X-RAY EXAM OF FOREARM: CPT | Mod: 26,LT

## 2023-10-24 PROCEDURE — 99285 EMERGENCY DEPT VISIT HI MDM: CPT | Mod: 25

## 2023-10-24 PROCEDURE — 85025 COMPLETE CBC W/AUTO DIFF WBC: CPT

## 2023-10-24 PROCEDURE — 36415 COLL VENOUS BLD VENIPUNCTURE: CPT

## 2023-10-24 PROCEDURE — 99053 MED SERV 10PM-8AM 24 HR FAC: CPT

## 2023-10-24 PROCEDURE — 99234 HOSP IP/OBS SM DT SF/LOW 45: CPT

## 2023-10-24 PROCEDURE — 80053 COMPREHEN METABOLIC PANEL: CPT

## 2023-10-24 PROCEDURE — 71045 X-RAY EXAM CHEST 1 VIEW: CPT

## 2023-10-24 PROCEDURE — 84484 ASSAY OF TROPONIN QUANT: CPT

## 2023-10-24 PROCEDURE — 73090 X-RAY EXAM OF FOREARM: CPT | Mod: LT

## 2023-10-24 PROCEDURE — 85610 PROTHROMBIN TIME: CPT

## 2023-10-24 PROCEDURE — 93005 ELECTROCARDIOGRAM TRACING: CPT

## 2023-10-24 PROCEDURE — 82607 VITAMIN B-12: CPT

## 2023-10-24 PROCEDURE — 82746 ASSAY OF FOLIC ACID SERUM: CPT

## 2023-10-24 PROCEDURE — G0378: CPT

## 2023-10-24 PROCEDURE — 71045 X-RAY EXAM CHEST 1 VIEW: CPT | Mod: 26

## 2023-10-24 PROCEDURE — 93010 ELECTROCARDIOGRAM REPORT: CPT

## 2023-10-24 PROCEDURE — 80307 DRUG TEST PRSMV CHEM ANLYZR: CPT

## 2023-10-24 PROCEDURE — 70450 CT HEAD/BRAIN W/O DYE: CPT | Mod: MA

## 2023-10-24 PROCEDURE — 70450 CT HEAD/BRAIN W/O DYE: CPT | Mod: 26,MA

## 2023-10-24 PROCEDURE — 85730 THROMBOPLASTIN TIME PARTIAL: CPT

## 2023-10-24 RX ORDER — SODIUM CHLORIDE 9 MG/ML
1000 INJECTION, SOLUTION INTRAVENOUS ONCE
Refills: 0 | Status: COMPLETED | OUTPATIENT
Start: 2023-10-24 | End: 2023-10-24

## 2023-10-24 RX ADMIN — SODIUM CHLORIDE 1000 MILLILITER(S): 9 INJECTION, SOLUTION INTRAVENOUS at 14:56

## 2023-10-24 RX ADMIN — SODIUM CHLORIDE 1000 MILLILITER(S): 9 INJECTION, SOLUTION INTRAVENOUS at 05:38

## 2023-10-24 NOTE — ED PROVIDER NOTE - PHYSICAL EXAMINATION
VITAL SIGNS: I have reviewed nursing notes and confirm.  CONSTITUTIONAL: Well-developed; well-nourished; in no acute distress.  SKIN: Skin exam is warm and dry, no acute rash.  HEAD: Normocephalic; atraumatic.  EYES: PERRL, EOM intact; conjunctiva and sclera clear.  ENT: No nasal discharge; airway clear.   NECK: Supple; non tender.  CARD: S1, S2 normal; no murmurs, gallops, or rubs. Regular rate and rhythm.  RESP: No wheezes, rales or rhonchi.  ABD: Normal bowel sounds; soft; non-distended; non-tender  EXT: Normal ROM. good  strength, sensation intact  NEURO: Awake, alert, oriented. CN II-XII intact, 5/5 strength at upper and lower extremities, no pronator drift, intact finger to nose, steady gait, mild dysarthria, mild aphasia  PSYCH: Cooperative, appropriate.
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
## 2023-10-24 NOTE — CONSULT NOTE ADULT - SUBJECTIVE AND OBJECTIVE BOX
Neurology Consult Note     VIRGILIO DAN 52y Male [id number]    52M. PMH: Schizophrenia (noncompliant with meds [id number]), C3-4 laminectomy, polysubstance abuse, alcohol use  Presented 10/23 c/o difficulty speaking x1 week a/w change in voice. He also reports numbness/tingling in R 4th and 5th digits.     Overnight events:  None    Subjective complaints:  Pt evaluated at bedside. Pt has no acute complaints.     Vital Signs  T(F): 97.7 (03:41), Max: 97.7 (03:41)  HR: 70 (03:41) (70 - 70)  BP: 99/66 (03:41) (99/66 - 99/66)  RR: 18 (03:41) (18 - 18)  SpO2: 99% (03:41) (99% - 99%)    Neurological Exam:         Labs:  WBC 8.93 /HGB 15.0 /MCV 92.9 /HCT 43.4 / / 10-24    10-24    141  |  103  |  17  ----------------------------<  76  4.0   |  27  |  0.9    Ca    9.3      24 Oct 2023 04:50    TPro  6.8  /  Alb  4.5  /  TBili  0.5  /  DBili  x   /  AST  22  /  ALT  10  /  AlkPhos  61  10-24    LIVER FUNCTIONS - ( 24 Oct 2023 04:50 )  Alb: 4.5 g/dL / Pro: 6.8 g/dL / ALK PHOS: 61 U/L / ALT: 10 U/L / AST: 22 U/L / GGT: x           PT/INR - ( 24 Oct 2023 04:50 )   PT: 10.10 sec;   INR: 0.89 ratio         PTT - ( 24 Oct 2023 04:50 )  PTT:32.4 sec  Urinalysis Basic - ( 24 Oct 2023 04:50 )    Color: x / Appearance: x / SG: x / pH: x  Gluc: 76 mg/dL / Ketone: x  / Bili: x / Urobili: x   Blood: x / Protein: x / Nitrite: x   Leuk Esterase: x / RBC: x / WBC x   Sq Epi: x / Non Sq Epi: x / Bacteria: x        Medications:   Neurology Consult Note     VIRGILIO DAN 52y Male [id number]    52M. PMH: Schizophrenia (noncompliant with meds [id number]), C3-4 laminectomy, polysubstance abuse, alcohol use  Presented 10/23 c/o difficulty speaking x1 week a/w change in voice. At time of exam, pt is uncooperative and does not wish to answer many questions or comply with majority of exam. He reports he feels weird, and can't speak much. When asked about numbness and tingling in the right hand, he reports he never said that and denies symptoms at this time.     Vital Signs  T(F): 97.7 (03:41), Max: 97.7 (03:41)  HR: 70 (03:41) (70 - 70)  BP: 99/66 (03:41) (99/66 - 99/66)  RR: 18 (03:41) (18 - 18)  SpO2: 99% (03:41) (99% - 99%)    Neurological Exam:   - Mental Status: awake, but drowsy and falls asleep during exam, oriented to person, refuses to answer place or time; Speech is fluent he is able to name "pen" and the color of gloves as "purple" but calls keys "car", will not name glove, intact comprehension of simple verbal tasks; Able to read a sentence. ?Unable to repeat vs refusal though was compliant with reading  - Cranial Nerves II-XII:    II:  Visual fields are full to confrontation, Pupils are equal, round, and reactive to light;    III, IV, VI:  Extraocular movements are intact without nystagmus.  V: Pt reports right sided diminished sensation  VII: mild L sided facial droop  VIII:  Hearing is intact to voice.  IX, X:  Unable to assess  XI: Unable to assess  XII: Unable to assess  - Motor: Moves BLUE symmetrically, but refuses to allow assessment for drift strength 5/5 at elbow flexion, refused further strength assessment due to feeling cold, Strength is 5/5 BLLE.  - Reflexes: Unable to assess  - Sensory: Intact to light touch  - Coordination: Unable to assess      Labs:  WBC 8.93 /HGB 15.0 /MCV 92.9 /HCT 43.4 / / 10-24    10-24    141  |  103  |  17  ----------------------------<  76  4.0   |  27  |  0.9    Ca    9.3      24 Oct 2023 04:50    TPro  6.8  /  Alb  4.5  /  TBili  0.5  /  DBili  x   /  AST  22  /  ALT  10  /  AlkPhos  61  10-24    LIVER FUNCTIONS - ( 24 Oct 2023 04:50 )  Alb: 4.5 g/dL / Pro: 6.8 g/dL / ALK PHOS: 61 U/L / ALT: 10 U/L / AST: 22 U/L / GGT: x           PT/INR - ( 24 Oct 2023 04:50 )   PT: 10.10 sec;   INR: 0.89 ratio         PTT - ( 24 Oct 2023 04:50 )  PTT:32.4 sec  Urinalysis Basic - ( 24 Oct 2023 04:50 )    Color: x / Appearance: x / SG: x / pH: x  Gluc: 76 mg/dL / Ketone: x  / Bili: x / Urobili: x   Blood: x / Protein: x / Nitrite: x   Leuk Esterase: x / RBC: x / WBC x   Sq Epi: x / Non Sq Epi: x / Bacteria: x        Medications:   Neurology Consult Note     VIRGILIO DAN 52y Male [id number]    52M. PMH: Schizophrenia (noncompliant with meds [id number]), C3-4 laminectomy, ORIF with fixation plate and screws in the anterior right mandible. 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 At time of exam, pt is uncooperative and does not wish to answer many questions or comply with majority of exam. He reports he feels weird, and can't speak much. When asked about numbness and tingling in the right hand, he reports he never said that and denies symptoms at this time.     Vital Signs  T(F): 97.7 (03:41), Max: 97.7 (03:41)  HR: 70 (03:41) (70 - 70)  BP: 99/66 (03:41) (99/66 - 99/66)  RR: 18 (03:41) (18 - 18)  SpO2: 99% (03:41) (99% - 99%)    Neurological Exam:   - Mental Status: awake, but drowsy and falls asleep during exam, oriented to person, refuses to answer place or time; Speech is fluent he is able to name "pen" and the color of gloves as "purple" but calls keys "car", will not name glove, intact comprehension of simple verbal tasks; Able to read a sentence. ?Unable to repeat vs refusal though was compliant with reading  - Cranial Nerves II-XII:    II:  Visual fields are full to confrontation, Pupils are equal, round, and reactive to light;    III, IV, VI:  Extraocular movements are intact without nystagmus.  V: Pt reports right sided diminished sensation  VII: mild L sided facial droop  VIII:  Hearing is intact to voice.  IX, X:  Unable to assess  XI: Unable to assess  XII: Unable to assess  - Motor: Moves BLUE symmetrically, but refuses to allow assessment for drift strength 5/5 at elbow flexion, refused further strength assessment due to feeling cold, Strength is 5/5 BLLE.  - Reflexes: Unable to assess  - Sensory: Intact to light touch  - Coordination: Unable to assess    Labs:  WBC 8.93 /HGB 15.0 /MCV 92.9 /HCT 43.4 / / 10-24    10-24    141  |  103  |  17  ----------------------------<  76  4.0   |  27  |  0.9    Ca    9.3      24 Oct 2023 04:50    TPro  6.8  /  Alb  4.5  /  TBili  0.5  /  DBili  x   /  AST  22  /  ALT  10  /  AlkPhos  61  10-24    LIVER FUNCTIONS - ( 24 Oct 2023 04:50 )  Alb: 4.5 g/dL / Pro: 6.8 g/dL / ALK PHOS: 61 U/L / ALT: 10 U/L / AST: 22 U/L / GGT: x           PT/INR - ( 24 Oct 2023 04:50 )   PT: 10.10 sec;   INR: 0.89 ratio         PTT - ( 24 Oct 2023 04:50 )  PTT:32.4 sec  Urinalysis Basic - ( 24 Oct 2023 04:50 )    Color: x / Appearance: x / SG: x / pH: x  Gluc: 76 mg/dL / Ketone: x  / Bili: x / Urobili: x   Blood: x / Protein: x / Nitrite: x   Leuk Esterase: x / RBC: x / WBC x   Sq Epi: x / Non Sq Epi: x / Bacteria: x        Medications:

## 2023-10-24 NOTE — CHART NOTE - NSCHARTNOTEFT_GEN_A_CORE
If patient unable to obtain MRI brain, may obtain repeat CTH. if negative, may follow up with neurology outpatient  Discussed with Dr. Kidd.
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
## 2023-10-24 NOTE — ED PROVIDER NOTE - CARE PLAN
Principal Discharge DX:	Dysarthria  Secondary Diagnosis:	Aphasia  Secondary Diagnosis:	Paresthesias   1

## 2023-10-24 NOTE — ED PROVIDER NOTE - OBJECTIVE STATEMENT
51 yo M, hx of schizophrenia, non compliant with meds x 6 months, polysubstance use, alcohol and occasional drugs but not forthcoming with which drugs he uses and last use here for assessment of difficulties with speech and hypertesthesias/tingling to R hand.    Patient notes that for the past week or more he has been having difficulty speaking described as not being able to say what he wants to say. He also notes that his voice sounds different. Notes numbness described as tingling to R hand, specifically in 4th and 5th digits. No recent illness, travel, trauma.

## 2023-10-24 NOTE — ED ADULT TRIAGE NOTE - CHIEF COMPLAINT QUOTE
Pt presenting to ED c/o aphasia and R hand numbness x1 week Pt presenting to ED c/o difficulty speaking and R hand numbness x1 week

## 2023-10-24 NOTE — CONSULT NOTE ADULT - ASSESSMENT
52M. PMH: Schizophrenia (noncompliant with meds [id number]), C3-4 laminectomy, polysubstance abuse, alcohol use  Presented 10/23 c/o difficulty speaking x1 week a/w change in voice. He also reports numbness/tingling in R 4th and 5th digits.   CTH (-) for acute path 52M. PMH: Schizophrenia (noncompliant with meds [id number]), C3-4 laminectomy, polysubstance abuse, alcohol use  Presented 10/23 c/o difficulty speaking x1 week a/w change in voice. He also reports numbness/tingling in R 4th and 5th digits.   DARRON 44, CTH (-) for acute path   52M. PMH: Schizophrenia (noncompliant with meds [id number]), C3-4 laminectomy, polysubstance abuse, alcohol use  Presented 10/23 c/o difficulty speaking x1 week a/w change in voice. At time of exam, pt is uncooperative and does not wish to answer many questions or comply with majority of exam. He reports he feels weird, and can't speak much. When asked about numbness and tingling in the right hand, he reports he never said that and denies symptoms at this time.     - CTH (-) for acute path    Recommendations:   - F/u [facility]ox    ***Recommendations pending final review with Attending Physician***   52M. PMH: Schizophrenia (noncompliant with meds [id number]), C3-4 laminectomy, polysubstance abuse, alcohol use  Presented 10/23 c/o difficulty speaking x1 week a/w change in voice. At time of exam, pt is uncooperative and does not wish to answer many questions or comply with majority of exam. He reports he feels weird, and can't speak much. When asked about numbness and tingling in the right hand, he reports he never said that and denies symptoms at this time.     - CTH (-) for acute path    Recommendations:   - F/u [facility]ox  - OBS MRI    ***Recommendations pending final review with Attending Physician***   52M. PMH: Schizophrenia (noncompliant with meds [id number]), C3-4 laminectomy, polysubstance abuse, alcohol use  Presented 10/23 c/o difficulty speaking x1 week a/w change in voice. At time of exam, pt is uncooperative and does not wish to answer many questions or comply with majority of exam. He reports he feels weird, and can't speak much. When asked about numbness and tingling in the right hand, he reports he never said that and denies symptoms at this time.     - CTH (-) for acute path    Recommendations:   - F/u [facility]ox  - OBS MRI   52M. PMH: Schizophrenia (noncompliant with meds [id number]), C3-4 laminectomy, polysubstance abuse, alcohol use  Presented 10/23 c/o difficulty speaking x1 week a/w change in voice. At time of exam, pt is uncooperative and does not wish to answer many questions or comply with majority of exam. He reports he feels weird, and can't speak much. When asked about numbness and tingling in the right hand, he reports he never said that and denies symptoms at this time.     - CTH (-) for acute path    Recommendations:   - F/u [facility]ox  - OBS MRI  - if no MRI, may obtain repeat HCT  - outpt EMG/NCS L hand numbness
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
## 2023-10-24 NOTE — ED CDU PROVIDER DISPOSITION NOTE - NSFOLLOWUPINSTRUCTIONS_ED_ALL_ED_FT
Paresthesia    WHAT YOU NEED TO KNOW:    Paresthesia is numbness, tingling, or burning. It can happen in any part of your body, but usually occurs in your legs, feet, arms, or hands.    DISCHARGE INSTRUCTIONS:    Return to the emergency department if:     You have severe pain along with numbness and tingling.      Your legs suddenly become cold. You have trouble moving your legs, and they ache.      You have increased weakness in a part of your body.      You have uncontrolled movements.    Contact your healthcare provider or neurologist if:     Your symptoms do not improve.      You have symptoms in more than one part of your body.      You have questions or concerns about your condition or care.    Manage paresthesia:     Protect the area from injury. You may injure or burn yourself if you lose feeling in the area. Be careful when you touch anything that could be hot. Wear sturdy shoes to protect your feet. Ask about other ways to protect yourself.       Go to physical or occupational therapy if directed. Your provider may recommend therapy if you have a condition such as carpal tunnel syndrome. A physical therapist can teach you exercises to help strengthen the area or increase your ability to move it. An occupational therapist can help you find new ways to do your daily activities.      Manage health conditions that can cause paresthesia. Work with your diabetes specialist if you have uncontrolled diabetes. A dietitian or your healthcare provider can help you create a meal plan if you have low vitamin B levels. Your provider can help you manage your health if you have multiple sclerosis or you had a stroke. It is important to manage health conditions to stop paresthesia or prevent it from getting worse.    Follow up with your healthcare provider or neurologist as directed: Your healthcare provider may refer you to a specialist. Write down your questions so you remember to ask them during your visits

## 2023-10-24 NOTE — CONSULT NOTE ADULT - ATTENDING COMMENTS
PT reports L digit 3 - 5 numbness over last few weeks denies any acute  weakness, elbow, shoulder or neck pain.  Speech intermittent paraphasic errors but with normal repetition and naming.  Recommend MRI Brain for speech issues and f/u as outpt in [facility] for R hand numbness - possible entrapment neuropathy.  Rest of recommendations as above.

## 2023-10-24 NOTE — ED ADULT NURSE NOTE - BEFAST EYES
infection does seem to be improving. Culture negative. Continue augmentin and doxy as prescribed 12/17   No

## 2023-10-24 NOTE — ED PROVIDER NOTE - CLINICAL SUMMARY MEDICAL DECISION MAKING FREE TEXT BOX
No acute findings on CT scan, peripheral sx likely radicular however aphasia and dysarthria concerning for subacute CVA.    Will place in obs for completion of stroke work up.

## 2023-10-24 NOTE — ED CDU PROVIDER DISPOSITION NOTE - PATIENT PORTAL LINK FT
You can access the FollowMyHealth Patient Portal offered by [facility] by registering at the following website: [email or web address]. By joining [facility] [facility]’s FollowMyHealth portal, you will also be able to view your health information using other applications (apps) compatible with our system.

## 2023-10-24 NOTE — ED ADULT NURSE NOTE - NSFALLUNIVINTERV_ED_ALL_ED
Bed/Stretcher in lowest position, wheels locked, appropriate side rails in place/Call bell, personal items and telephone in reach/Instruct patient to call for assistance before getting out of bed/chair/stretcher/Non-slip footwear applied when patient is off stretcher/[place] to call system/Physically safe environment - no spills, clutter or unnecessary equipment/Purposeful proactive rounding/Room/bathroom lighting operational, light cord in reach

## 2023-10-24 NOTE — ED CDU PROVIDER DISPOSITION NOTE - NSFOLLOWUPCLINICS_GEN_ALL_ED_FT
[facility]  Neurology  [address], [address]  [place] [address]  Phone: [phone]  Fax:     [facility]  Neurosurgery  [address] [place] [address], [address]  [place] [address]  Phone: [phone]  Fax:

## 2023-11-22 ENCOUNTER — INPATIENT (INPATIENT)
Facility: HOSPITAL | Age: 53
LOS: 1 days | Discharge: HOME | DRG: 347 | End: 2023-11-24
Attending: STUDENT IN AN ORGANIZED HEALTH CARE EDUCATION/TRAINING PROGRAM | Admitting: STUDENT IN AN ORGANIZED HEALTH CARE EDUCATION/TRAINING PROGRAM
Payer: MEDICAID

## 2023-11-22 VITALS
HEART RATE: 69 BPM | TEMPERATURE: 97 F | DIASTOLIC BLOOD PRESSURE: 78 MMHG | WEIGHT: 139.99 LBS | RESPIRATION RATE: 20 BRPM | HEIGHT: 60 IN | OXYGEN SATURATION: 99 % | SYSTOLIC BLOOD PRESSURE: 119 MMHG

## 2023-11-22 DIAGNOSIS — Z98.890 OTHER SPECIFIED POSTPROCEDURAL STATES: Chronic | ICD-10-CM

## 2023-11-22 DIAGNOSIS — S12.600A UNSPECIFIED DISPLACED FRACTURE OF SEVENTH CERVICAL VERTEBRA, INITIAL ENCOUNTER FOR CLOSED FRACTURE: ICD-10-CM

## 2023-11-22 LAB
ALBUMIN SERPL ELPH-MCNC: 4.2 G/DL — SIGNIFICANT CHANGE UP (ref 3.5–5.2)
ALBUMIN SERPL ELPH-MCNC: 4.2 G/DL — SIGNIFICANT CHANGE UP (ref 3.5–5.2)
ALP SERPL-CCNC: 59 U/L — SIGNIFICANT CHANGE UP (ref 30–115)
ALP SERPL-CCNC: 59 U/L — SIGNIFICANT CHANGE UP (ref 30–115)
ALT FLD-CCNC: 14 U/L — SIGNIFICANT CHANGE UP (ref 0–41)
ALT FLD-CCNC: 14 U/L — SIGNIFICANT CHANGE UP (ref 0–41)
ANION GAP SERPL CALC-SCNC: 9 MMOL/L — SIGNIFICANT CHANGE UP (ref 7–14)
ANION GAP SERPL CALC-SCNC: 9 MMOL/L — SIGNIFICANT CHANGE UP (ref 7–14)
AST SERPL-CCNC: 28 U/L — SIGNIFICANT CHANGE UP (ref 0–41)
AST SERPL-CCNC: 28 U/L — SIGNIFICANT CHANGE UP (ref 0–41)
BASOPHILS # BLD AUTO: 0.04 K/UL — SIGNIFICANT CHANGE UP (ref 0–0.2)
BASOPHILS # BLD AUTO: 0.04 K/UL — SIGNIFICANT CHANGE UP (ref 0–0.2)
BASOPHILS NFR BLD AUTO: 0.7 % — SIGNIFICANT CHANGE UP (ref 0–1)
BASOPHILS NFR BLD AUTO: 0.7 % — SIGNIFICANT CHANGE UP (ref 0–1)
BILIRUB SERPL-MCNC: 0.7 MG/DL — SIGNIFICANT CHANGE UP (ref 0.2–1.2)
BILIRUB SERPL-MCNC: 0.7 MG/DL — SIGNIFICANT CHANGE UP (ref 0.2–1.2)
BUN SERPL-MCNC: 19 MG/DL — SIGNIFICANT CHANGE UP (ref 10–20)
BUN SERPL-MCNC: 19 MG/DL — SIGNIFICANT CHANGE UP (ref 10–20)
CALCIUM SERPL-MCNC: 9.4 MG/DL — SIGNIFICANT CHANGE UP (ref 8.4–10.4)
CALCIUM SERPL-MCNC: 9.4 MG/DL — SIGNIFICANT CHANGE UP (ref 8.4–10.4)
CHLORIDE SERPL-SCNC: 108 MMOL/L — SIGNIFICANT CHANGE UP (ref 98–110)
CHLORIDE SERPL-SCNC: 108 MMOL/L — SIGNIFICANT CHANGE UP (ref 98–110)
CO2 SERPL-SCNC: 27 MMOL/L — SIGNIFICANT CHANGE UP (ref 17–32)
CO2 SERPL-SCNC: 27 MMOL/L — SIGNIFICANT CHANGE UP (ref 17–32)
CREAT SERPL-MCNC: 0.9 MG/DL — SIGNIFICANT CHANGE UP (ref 0.7–1.5)
CREAT SERPL-MCNC: 0.9 MG/DL — SIGNIFICANT CHANGE UP (ref 0.7–1.5)
EGFR: 101 ML/MIN/1.73M2 — SIGNIFICANT CHANGE UP
EGFR: 101 ML/MIN/1.73M2 — SIGNIFICANT CHANGE UP
EOSINOPHIL # BLD AUTO: 0.27 K/UL — SIGNIFICANT CHANGE UP (ref 0–0.7)
EOSINOPHIL # BLD AUTO: 0.27 K/UL — SIGNIFICANT CHANGE UP (ref 0–0.7)
EOSINOPHIL NFR BLD AUTO: 4.4 % — SIGNIFICANT CHANGE UP (ref 0–8)
EOSINOPHIL NFR BLD AUTO: 4.4 % — SIGNIFICANT CHANGE UP (ref 0–8)
GLUCOSE SERPL-MCNC: 140 MG/DL — HIGH (ref 70–99)
GLUCOSE SERPL-MCNC: 140 MG/DL — HIGH (ref 70–99)
HCT VFR BLD CALC: 40.9 % — LOW (ref 42–52)
HCT VFR BLD CALC: 40.9 % — LOW (ref 42–52)
HGB BLD-MCNC: 14.2 G/DL — SIGNIFICANT CHANGE UP (ref 14–18)
HGB BLD-MCNC: 14.2 G/DL — SIGNIFICANT CHANGE UP (ref 14–18)
IMM GRANULOCYTES NFR BLD AUTO: 0.3 % — SIGNIFICANT CHANGE UP (ref 0.1–0.3)
IMM GRANULOCYTES NFR BLD AUTO: 0.3 % — SIGNIFICANT CHANGE UP (ref 0.1–0.3)
LYMPHOCYTES # BLD AUTO: 0.91 K/UL — LOW (ref 1.2–3.4)
LYMPHOCYTES # BLD AUTO: 0.91 K/UL — LOW (ref 1.2–3.4)
LYMPHOCYTES # BLD AUTO: 15 % — LOW (ref 20.5–51.1)
LYMPHOCYTES # BLD AUTO: 15 % — LOW (ref 20.5–51.1)
MCHC RBC-ENTMCNC: 32.3 PG — HIGH (ref 27–31)
MCHC RBC-ENTMCNC: 32.3 PG — HIGH (ref 27–31)
MCHC RBC-ENTMCNC: 34.7 G/DL — SIGNIFICANT CHANGE UP (ref 32–37)
MCHC RBC-ENTMCNC: 34.7 G/DL — SIGNIFICANT CHANGE UP (ref 32–37)
MCV RBC AUTO: 93 FL — SIGNIFICANT CHANGE UP (ref 80–94)
MCV RBC AUTO: 93 FL — SIGNIFICANT CHANGE UP (ref 80–94)
MONOCYTES # BLD AUTO: 0.58 K/UL — SIGNIFICANT CHANGE UP (ref 0.1–0.6)
MONOCYTES # BLD AUTO: 0.58 K/UL — SIGNIFICANT CHANGE UP (ref 0.1–0.6)
MONOCYTES NFR BLD AUTO: 9.6 % — HIGH (ref 1.7–9.3)
MONOCYTES NFR BLD AUTO: 9.6 % — HIGH (ref 1.7–9.3)
NEUTROPHILS # BLD AUTO: 4.25 K/UL — SIGNIFICANT CHANGE UP (ref 1.4–6.5)
NEUTROPHILS # BLD AUTO: 4.25 K/UL — SIGNIFICANT CHANGE UP (ref 1.4–6.5)
NEUTROPHILS NFR BLD AUTO: 70 % — SIGNIFICANT CHANGE UP (ref 42.2–75.2)
NEUTROPHILS NFR BLD AUTO: 70 % — SIGNIFICANT CHANGE UP (ref 42.2–75.2)
NRBC # BLD: 0 /100 WBCS — SIGNIFICANT CHANGE UP (ref 0–0)
NRBC # BLD: 0 /100 WBCS — SIGNIFICANT CHANGE UP (ref 0–0)
PLATELET # BLD AUTO: 130 K/UL — SIGNIFICANT CHANGE UP (ref 130–400)
PLATELET # BLD AUTO: 130 K/UL — SIGNIFICANT CHANGE UP (ref 130–400)
PMV BLD: 12.3 FL — HIGH (ref 7.4–10.4)
PMV BLD: 12.3 FL — HIGH (ref 7.4–10.4)
POTASSIUM SERPL-MCNC: 4.4 MMOL/L — SIGNIFICANT CHANGE UP (ref 3.5–5)
POTASSIUM SERPL-MCNC: 4.4 MMOL/L — SIGNIFICANT CHANGE UP (ref 3.5–5)
POTASSIUM SERPL-SCNC: 4.4 MMOL/L — SIGNIFICANT CHANGE UP (ref 3.5–5)
POTASSIUM SERPL-SCNC: 4.4 MMOL/L — SIGNIFICANT CHANGE UP (ref 3.5–5)
PROT SERPL-MCNC: 6.3 G/DL — SIGNIFICANT CHANGE UP (ref 6–8)
PROT SERPL-MCNC: 6.3 G/DL — SIGNIFICANT CHANGE UP (ref 6–8)
RBC # BLD: 4.4 M/UL — LOW (ref 4.7–6.1)
RBC # BLD: 4.4 M/UL — LOW (ref 4.7–6.1)
RBC # FLD: 12.7 % — SIGNIFICANT CHANGE UP (ref 11.5–14.5)
RBC # FLD: 12.7 % — SIGNIFICANT CHANGE UP (ref 11.5–14.5)
SODIUM SERPL-SCNC: 144 MMOL/L — SIGNIFICANT CHANGE UP (ref 135–146)
SODIUM SERPL-SCNC: 144 MMOL/L — SIGNIFICANT CHANGE UP (ref 135–146)
WBC # BLD: 6.07 K/UL — SIGNIFICANT CHANGE UP (ref 4.8–10.8)
WBC # BLD: 6.07 K/UL — SIGNIFICANT CHANGE UP (ref 4.8–10.8)
WBC # FLD AUTO: 6.07 K/UL — SIGNIFICANT CHANGE UP (ref 4.8–10.8)
WBC # FLD AUTO: 6.07 K/UL — SIGNIFICANT CHANGE UP (ref 4.8–10.8)

## 2023-11-22 PROCEDURE — 84100 ASSAY OF PHOSPHORUS: CPT

## 2023-11-22 PROCEDURE — 80076 HEPATIC FUNCTION PANEL: CPT

## 2023-11-22 PROCEDURE — 83735 ASSAY OF MAGNESIUM: CPT

## 2023-11-22 PROCEDURE — 80048 BASIC METABOLIC PNL TOTAL CA: CPT

## 2023-11-22 PROCEDURE — 99285 EMERGENCY DEPT VISIT HI MDM: CPT | Mod: 25

## 2023-11-22 PROCEDURE — 85027 COMPLETE CBC AUTOMATED: CPT

## 2023-11-22 PROCEDURE — 80053 COMPREHEN METABOLIC PANEL: CPT

## 2023-11-22 PROCEDURE — 29515 APPLICATION SHORT LEG SPLINT: CPT | Mod: LT

## 2023-11-22 PROCEDURE — 99223 1ST HOSP IP/OBS HIGH 75: CPT

## 2023-11-22 PROCEDURE — 70450 CT HEAD/BRAIN W/O DYE: CPT | Mod: 26,MA

## 2023-11-22 PROCEDURE — 36415 COLL VENOUS BLD VENIPUNCTURE: CPT

## 2023-11-22 PROCEDURE — 70486 CT MAXILLOFACIAL W/O DYE: CPT | Mod: 26,MA

## 2023-11-22 PROCEDURE — 72125 CT NECK SPINE W/O DYE: CPT | Mod: 26,MA

## 2023-11-22 PROCEDURE — 73610 X-RAY EXAM OF ANKLE: CPT | Mod: 26,LT

## 2023-11-22 PROCEDURE — 97163 PT EVAL HIGH COMPLEX 45 MIN: CPT | Mod: GP

## 2023-11-22 RX ORDER — ENOXAPARIN SODIUM 100 MG/ML
40 INJECTION SUBCUTANEOUS EVERY 24 HOURS
Refills: 0 | Status: DISCONTINUED | OUTPATIENT
Start: 2023-11-22 | End: 2023-11-24

## 2023-11-22 RX ORDER — MIDAZOLAM HYDROCHLORIDE 1 MG/ML
2 INJECTION, SOLUTION INTRAMUSCULAR; INTRAVENOUS ONCE
Refills: 0 | Status: DISCONTINUED | OUTPATIENT
Start: 2023-11-22 | End: 2023-11-22

## 2023-11-22 RX ORDER — ALBUTEROL 90 UG/1
2 AEROSOL, METERED ORAL EVERY 6 HOURS
Refills: 0 | Status: DISCONTINUED | OUTPATIENT
Start: 2023-11-22 | End: 2023-11-24

## 2023-11-22 RX ORDER — THIAMINE MONONITRATE (VIT B1) 100 MG
100 TABLET ORAL ONCE
Refills: 0 | Status: COMPLETED | OUTPATIENT
Start: 2023-11-22 | End: 2023-11-22

## 2023-11-22 RX ORDER — GABAPENTIN 400 MG/1
300 CAPSULE ORAL EVERY 8 HOURS
Refills: 0 | Status: DISCONTINUED | OUTPATIENT
Start: 2023-11-22 | End: 2023-11-24

## 2023-11-22 RX ORDER — TETANUS TOXOID, REDUCED DIPHTHERIA TOXOID AND ACELLULAR PERTUSSIS VACCINE, ADSORBED 5; 2.5; 8; 8; 2.5 [IU]/.5ML; [IU]/.5ML; UG/.5ML; UG/.5ML; UG/.5ML
0.5 SUSPENSION INTRAMUSCULAR ONCE
Refills: 0 | Status: COMPLETED | OUTPATIENT
Start: 2023-11-22 | End: 2023-11-22

## 2023-11-22 RX ORDER — HYDROXYZINE HCL 10 MG
50 TABLET ORAL EVERY 8 HOURS
Refills: 0 | Status: DISCONTINUED | OUTPATIENT
Start: 2023-11-22 | End: 2023-11-24

## 2023-11-22 RX ORDER — CHLORHEXIDINE GLUCONATE 213 G/1000ML
1 SOLUTION TOPICAL
Refills: 0 | Status: DISCONTINUED | OUTPATIENT
Start: 2023-11-22 | End: 2023-11-24

## 2023-11-22 RX ORDER — ACETAMINOPHEN 500 MG
650 TABLET ORAL EVERY 6 HOURS
Refills: 0 | Status: DISCONTINUED | OUTPATIENT
Start: 2023-11-22 | End: 2023-11-24

## 2023-11-22 RX ORDER — RISPERIDONE 4 MG/1
1 TABLET ORAL
Refills: 0 | Status: DISCONTINUED | OUTPATIENT
Start: 2023-11-22 | End: 2023-11-24

## 2023-11-22 RX ADMIN — Medication 100 MILLIGRAM(S): at 15:23

## 2023-11-22 RX ADMIN — TETANUS TOXOID, REDUCED DIPHTHERIA TOXOID AND ACELLULAR PERTUSSIS VACCINE, ADSORBED 0.5 MILLILITER(S): 5; 2.5; 8; 8; 2.5 SUSPENSION INTRAMUSCULAR at 03:49

## 2023-11-22 RX ADMIN — GABAPENTIN 300 MILLIGRAM(S): 400 CAPSULE ORAL at 22:08

## 2023-11-22 RX ADMIN — MIDAZOLAM HYDROCHLORIDE 2 MILLIGRAM(S): 1 INJECTION, SOLUTION INTRAMUSCULAR; INTRAVENOUS at 03:40

## 2023-11-22 RX ADMIN — RISPERIDONE 1 MILLIGRAM(S): 4 TABLET ORAL at 18:27

## 2023-11-22 NOTE — ED ADULT NURSE REASSESSMENT NOTE - NS ED NURSE REASSESS COMMENT FT1
pt received from night shift RN. pt sleeping. VSS. pt on cardiac monitor. safety measures in place and maintained. will continue to monitor Stable

## 2023-11-22 NOTE — ED PROVIDER NOTE - PATIENT PORTAL LINK FT
You can access the FollowMyHealth Patient Portal offered by [facility] by registering at the following website: [email or web address]. By joining [employer]’s FollowMyHealth portal, you will also be able to view your health information using other applications (apps) compatible with our system.
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
## 2023-11-22 NOTE — ED PROVIDER NOTE - CARE PLAN
1 Principal Discharge DX:	Closed C7 fracture   Principal Discharge DX:	Closed C7 fracture  Secondary Diagnosis:	Closed avulsion fracture of ankle, initial encounter

## 2023-11-22 NOTE — H&P ADULT - NSHPLABSRESULTS_GEN_ALL_CORE
14.2   6.07  )-----------( 130      ( 22 Nov 2023 14:49 )             40.9     11-22    144  |  108  |  19  ----------------------------<  140<H>  4.4   |  27  |  0.9    Ca    9.4      22 Nov 2023 14:49    TPro  6.3  /  Alb  4.2  /  TBili  0.7  /  DBili  x   /  AST  28  /  ALT  14  /  AlkPhos  59  11-22      Urinalysis Basic - ( 22 Nov 2023 14:49 )    Color: x / Appearance: x / SG: x / pH: x  Gluc: 140 mg/dL / Ketone: x  / Bili: x / Urobili: x   Blood: x / Protein: x / Nitrite: x   Leuk Esterase: x / RBC: x / WBC x   Sq Epi: x / Non Sq Epi: x / Bacteria: x

## 2023-11-22 NOTE — CONSULT NOTE ADULT - NS ATTEND AMEND GEN_ALL_CORE FT
Pt seen and examined in Ed. Fracture appears subacute. Pt is intact. no pain numbness, tingling or motor deficit in extremities. Pt is to remain in [place] c collar at all times. He was told failure to do so could result in neurologic injury/spinal cord injury/paralysis. Pt needs to follow up in office in 2 weeks. He was further told for any concerning changes in exam he should come to ER.     I will see him in follow up.
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
## 2023-11-22 NOTE — ED PROVIDER NOTE - PHYSICAL EXAMINATION
VITAL SIGNS: I have reviewed nursing notes and confirm.  CONSTITUTIONAL: 54 yo M laying on stretcher; in no acute distress.  SKIN: Skin exam is warm and dry, no acute rash.  HEAD: Normocephalic; (+) ecchymosis/superficial abrasion to L cheek  EYES: PERRL, EOM intact; conjunctiva and sclera clear.  ENT: No nasal discharge; airway clear. (+) dried blood to L nare, no septal hematoma  NECK: Supple; non tender. No midline TTP.   CARD: S1, S2 normal; no murmurs, gallops, or rubs. Regular rate and rhythm.  RESP: No wheezes, rales or rhonchi. Speaking in full sentences.   ABD: Normal bowel sounds; soft; non-distended; non-tender; No rebound or guarding. No CVA tenderness.  EXT: Normal ROM. No clubbing, cyanosis or edema.  NEURO: Alert, oriented. Grossly unremarkable. No focal deficits.   PSYCH: Cooperative, appropriate. Denies SI/HI.
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
## 2023-11-22 NOTE — ED ADULT NURSE NOTE - SCORE
ED Back Pain/Injury HPI





- General


Chief Complaint: Back Pain/Injury


Stated Complaint: GATE FELL ON BACK/ BACK/CHEST PAIN


Time Seen by Provider: 07/07/19 22:07


Source: patient


Limitations: No Limitations





- History of Present Illness


Initial Comments: 





Patient is a 19-year-old female who comes to the ER today after having a gait 

fall on her back while she was at work.  She has had previous back surgery and 

she wanted to make sure that her back was okay.  She is ambulatory with no focal

deficit in the ER.





- Related Data


                                Home Medications











 Medication  Instructions  Recorded  Confirmed  Last Taken


 


Pnv,Calcium 72/Iron/Folic Acid 1 tab PO QDAY 04/28/16 12/21/18 2 Days Ago





[Pnv Prenatal Plus Multivit Tab]    ~12/19/18








                                  Previous Rx's











 Medication  Instructions  Recorded  Last Taken  Type


 


Ibuprofen [Motrin] 800 mg PO Q8HR PRN #25 tablet 07/07/19 Unknown Rx











                                    Allergies











Allergy/AdvReac Type Severity Reaction Status Date / Time


 


No Known Allergies Allergy   Verified 02/25/18 09:45














ED Review of Systems


ROS: 


Stated complaint: GATE FELL ON BACK/ BACK/CHEST PAIN


Other details as noted in HPI





Comment: All other systems reviewed and negative





ED Past Medical Hx





- Past Medical History


Anemia





ED Back Pain Physical Exam





- Exam


General: 


Vital signs noted. No distress. Alert and acting appropriately.


WDWN patient in NAD


VS per RN flow sheet


Alert and oriented to person, place and time. 


S1-S2.  No S3 or S4.  No systolic or diastolic murmur.  No JVD.  No pitting 

edema.


Lungs clear to auscultation bilaterally anteriorly and posteriorly.


Abdomen soft nontender bowel sounds X4


Moves all extremities well.


Mood and affect appropriate. 








ED Course


                                   Vital Signs











  07/07/19





  21:23


 


Temperature 98.5 F


 


Pulse Rate 98 H


 


Respiratory 18





Rate 


 


Blood Pressure 126/71


 


O2 Sat by Pulse 98





Oximetry 














Ed Back Pain Tests





- Tests


Tests: Normal X Rays





ED Medical Decision Making





- Radiology Data


Radiology results: report reviewed, image reviewed





- Medical Decision Making





NEURO INTACT


NO ABRASION/CONTUSION/LAC





XRAY NORMAL





DC HOME WITH OUTPT FOLLOW UP





                                   Vital Signs











  07/07/19





  21:23


 


Temperature 98.5 F


 


Pulse Rate 98 H


 


Respiratory 18





Rate 


 


Blood Pressure 126/71


 


O2 Sat by Pulse 98





Oximetry 











Critical care attestation.: 


If time is entered above; I have spent that time in minutes in the direct care 

of this critically ill patient, excluding procedure time.








ED Disposition


Clinical Impression: 


 Contusion, Back pain





Disposition: DC-01 TO HOME OR SELFCARE


Is pt being admited?: No


Does the pt Need Aspirin: No


Condition: Stable


Instructions:  Contusion in Adults (ED)


Additional Instructions: 


DIET AS TOLERATED





MEDS AS ORDERED TODAY IN ER


FOLLOW INSTRUCTIONS ON THE BOTTLE





FOLLOW UP PCP WITHIN 48 HOURS TO ENSURE YOU ARE GETTING BETTER





ACTIVITY AS TOLERATED





MOTRIN OR TYLENOL FOR PAIN OR FEVER





RETURN TO THE ER FOR WORSENING SYMPTOMS NOT RELIEVED BY YOUR MEDICATIONS.











Prescriptions: 


Ibuprofen [Motrin] 800 mg PO Q8HR PRN #25 tablet


 PRN Reason: Pain , Severe (7-10)


Referrals: 


REFUGIO ALLEN MD [Staff Physician] - 3-5 Days


Time of Disposition: 23:25
5

## 2023-11-22 NOTE — ED PROVIDER NOTE - NSFOLLOWUPINSTRUCTIONS_ED_ALL_ED_FT
Our Emergency Department Referral Coordinators will be reaching out to you in the next 24-48 hours from 9:00am to 5:00pm with a follow up appointment. Please expect a phone call from the hospital in that time frame. If you do not receive a call or if you have any questions or concerns, you can reach them at   [phone]       Fracture    A fracture is a break in one of your bones. This can occur from a variety of injuries, especially traumatic ones. Symptoms include pain, bruising, or swelling. Do not use the injured limb. If a fracture is in one of the bones below your waist, do not put weight on that limb unless instructed to do so by your healthcare provider. Crutches or a cane may have been provided. A splint or cast may have been applied by your health care provider. Make sure to keep it dry and follow up with an orthopedist as instructed.    SEEK IMMEDIATE MEDICAL CARE IF YOU HAVE ANY OF THE FOLLOWING SYMPTOMS: numbness, tingling, increasing pain, or weakness in any part of the injured limb.

## 2023-11-22 NOTE — ED PROVIDER NOTE - CARE PROVIDER_API CALL
Anurag Mandujano  Neurosurgery  [address] [place] [address], [address]  [place] [address]  Phone: [phone]  Fax: [phone]  Follow Up Time: 1-3 Days
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
## 2023-11-22 NOTE — H&P ADULT - NSHPPHYSICALEXAM_GEN_ALL_CORE
nad  skin warm, dry  ncat  perrl/eomi  ent- eac/tms clear, +L facial swelling, dried epistaxis in nares, op clear pharynx nml  neck supple no midline or paraspinal ttpp  chest atx, non-tender  rrr nl s1s2 no mrg  ctab no wrr  abd soft ntnd no palpable masses no rgr  back non-tender no cvat  ext no cce dpi  neuro aaox3 grossly nf exam.

## 2023-11-22 NOTE — ED PROVIDER NOTE - PROGRESS NOTE DETAILS
neurosurgery aware, collar placed . cleared from neurosurgery, ok for dc. pt aware of tonsilar finding. EP: Patient is awake and alert, has normal speech, reports feeling much better, eating a meal at present.  When he reached clinical sobriety he started complaining of pain in his left ankle.  Exam shows tenderness to palpation over the medial malleolus with mild overlying erythema, extremity is neurovascularly intact.  X-rays obtained and shows avulsion fracture of the medial malleolus, splint was applied.  At present this is rather an unsafe discharge with patient wearing a c-collar and now requiring crutches for ambulation.  Will admit.

## 2023-11-22 NOTE — ED ADULT TRIAGE NOTE - NS ED NURSE AMBULANCES
Patient called wanting to speak with Dr. Crump regarding the results of his sleep study. I informed him Dr. Crump was out of the office.  I said someone would look into this for him, be said he was sure we wouldn't.     [employer]

## 2023-11-22 NOTE — ED PROVIDER NOTE - CLINICAL SUMMARY MEDICAL DECISION MAKING FREE TEXT BOX
53-year-old male with schizophrenia presenting to ED intoxicated.  CT head and C-spine initially read as negative, attending read shows fracture of C7.  Neurosurgery was consulted.  Patient was cleared for discharge with a c-collar in place and outpatient follow-up with neurosurgery. In the meantime he was found to have left medial malleolus avulsion fracture, splint was applied, patient was admitted for further management given history of alcoholism, C7 fracture and now requirement for crutches/ambulatory dysfunction.

## 2023-11-22 NOTE — ED PROVIDER NOTE - CONSIDERATION OF ADMISSION OBSERVATION
Consideration of Admission/Observation Patient with C-spine fracture, no avulsion fracture of his left ankle, history of alcoholism, will admit
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
## 2023-11-22 NOTE — H&P ADULT - ATTENDING COMMENTS
The patient is seen and examined the history labs and imaging are reviewed. I agree with the resident note unless otherwise noted.

## 2023-11-23 LAB
ALBUMIN SERPL ELPH-MCNC: 3.9 G/DL — SIGNIFICANT CHANGE UP (ref 3.5–5.2)
ALBUMIN SERPL ELPH-MCNC: 3.9 G/DL — SIGNIFICANT CHANGE UP (ref 3.5–5.2)
ALBUMIN SERPL ELPH-MCNC: 4 G/DL — SIGNIFICANT CHANGE UP (ref 3.5–5.2)
ALBUMIN SERPL ELPH-MCNC: 4 G/DL — SIGNIFICANT CHANGE UP (ref 3.5–5.2)
ALP SERPL-CCNC: 60 U/L — SIGNIFICANT CHANGE UP (ref 30–115)
ALP SERPL-CCNC: 60 U/L — SIGNIFICANT CHANGE UP (ref 30–115)
ALP SERPL-CCNC: 63 U/L — SIGNIFICANT CHANGE UP (ref 30–115)
ALP SERPL-CCNC: 63 U/L — SIGNIFICANT CHANGE UP (ref 30–115)
ALT FLD-CCNC: 13 U/L — SIGNIFICANT CHANGE UP (ref 0–41)
ANION GAP SERPL CALC-SCNC: 6 MMOL/L — LOW (ref 7–14)
ANION GAP SERPL CALC-SCNC: 6 MMOL/L — LOW (ref 7–14)
ANION GAP SERPL CALC-SCNC: 7 MMOL/L — SIGNIFICANT CHANGE UP (ref 7–14)
ANION GAP SERPL CALC-SCNC: 7 MMOL/L — SIGNIFICANT CHANGE UP (ref 7–14)
AST SERPL-CCNC: 22 U/L — SIGNIFICANT CHANGE UP (ref 0–41)
BILIRUB DIRECT SERPL-MCNC: <0.2 MG/DL — SIGNIFICANT CHANGE UP (ref 0–0.3)
BILIRUB DIRECT SERPL-MCNC: <0.2 MG/DL — SIGNIFICANT CHANGE UP (ref 0–0.3)
BILIRUB INDIRECT FLD-MCNC: >0.3 MG/DL — SIGNIFICANT CHANGE UP (ref 0.2–1.2)
BILIRUB INDIRECT FLD-MCNC: >0.3 MG/DL — SIGNIFICANT CHANGE UP (ref 0.2–1.2)
BILIRUB SERPL-MCNC: 0.5 MG/DL — SIGNIFICANT CHANGE UP (ref 0.2–1.2)
BUN SERPL-MCNC: 13 MG/DL — SIGNIFICANT CHANGE UP (ref 10–20)
BUN SERPL-MCNC: 13 MG/DL — SIGNIFICANT CHANGE UP (ref 10–20)
BUN SERPL-MCNC: 14 MG/DL — SIGNIFICANT CHANGE UP (ref 10–20)
BUN SERPL-MCNC: 14 MG/DL — SIGNIFICANT CHANGE UP (ref 10–20)
CALCIUM SERPL-MCNC: 9 MG/DL — SIGNIFICANT CHANGE UP (ref 8.4–10.5)
CALCIUM SERPL-MCNC: 9 MG/DL — SIGNIFICANT CHANGE UP (ref 8.4–10.5)
CALCIUM SERPL-MCNC: 9.3 MG/DL — SIGNIFICANT CHANGE UP (ref 8.4–10.4)
CALCIUM SERPL-MCNC: 9.3 MG/DL — SIGNIFICANT CHANGE UP (ref 8.4–10.4)
CHLORIDE SERPL-SCNC: 106 MMOL/L — SIGNIFICANT CHANGE UP (ref 98–110)
CHLORIDE SERPL-SCNC: 106 MMOL/L — SIGNIFICANT CHANGE UP (ref 98–110)
CHLORIDE SERPL-SCNC: 108 MMOL/L — SIGNIFICANT CHANGE UP (ref 98–110)
CHLORIDE SERPL-SCNC: 108 MMOL/L — SIGNIFICANT CHANGE UP (ref 98–110)
CO2 SERPL-SCNC: 26 MMOL/L — SIGNIFICANT CHANGE UP (ref 17–32)
CO2 SERPL-SCNC: 26 MMOL/L — SIGNIFICANT CHANGE UP (ref 17–32)
CO2 SERPL-SCNC: 27 MMOL/L — SIGNIFICANT CHANGE UP (ref 17–32)
CO2 SERPL-SCNC: 27 MMOL/L — SIGNIFICANT CHANGE UP (ref 17–32)
CREAT SERPL-MCNC: 0.9 MG/DL — SIGNIFICANT CHANGE UP (ref 0.7–1.5)
EGFR: 101 ML/MIN/1.73M2 — SIGNIFICANT CHANGE UP
GLUCOSE SERPL-MCNC: 91 MG/DL — SIGNIFICANT CHANGE UP (ref 70–99)
GLUCOSE SERPL-MCNC: 91 MG/DL — SIGNIFICANT CHANGE UP (ref 70–99)
GLUCOSE SERPL-MCNC: 93 MG/DL — SIGNIFICANT CHANGE UP (ref 70–99)
GLUCOSE SERPL-MCNC: 93 MG/DL — SIGNIFICANT CHANGE UP (ref 70–99)
HCT VFR BLD CALC: 41.9 % — LOW (ref 42–52)
HCT VFR BLD CALC: 41.9 % — LOW (ref 42–52)
HGB BLD-MCNC: 14.7 G/DL — SIGNIFICANT CHANGE UP (ref 14–18)
HGB BLD-MCNC: 14.7 G/DL — SIGNIFICANT CHANGE UP (ref 14–18)
MAGNESIUM SERPL-MCNC: 1.8 MG/DL — SIGNIFICANT CHANGE UP (ref 1.8–2.4)
MAGNESIUM SERPL-MCNC: 1.8 MG/DL — SIGNIFICANT CHANGE UP (ref 1.8–2.4)
MAGNESIUM SERPL-MCNC: 1.9 MG/DL — SIGNIFICANT CHANGE UP (ref 1.8–2.4)
MAGNESIUM SERPL-MCNC: 1.9 MG/DL — SIGNIFICANT CHANGE UP (ref 1.8–2.4)
MCHC RBC-ENTMCNC: 33.1 PG — HIGH (ref 27–31)
MCHC RBC-ENTMCNC: 33.1 PG — HIGH (ref 27–31)
MCHC RBC-ENTMCNC: 35.1 G/DL — SIGNIFICANT CHANGE UP (ref 32–37)
MCHC RBC-ENTMCNC: 35.1 G/DL — SIGNIFICANT CHANGE UP (ref 32–37)
MCV RBC AUTO: 94.4 FL — HIGH (ref 80–94)
MCV RBC AUTO: 94.4 FL — HIGH (ref 80–94)
NRBC # BLD: 0 /100 WBCS — SIGNIFICANT CHANGE UP (ref 0–0)
NRBC # BLD: 0 /100 WBCS — SIGNIFICANT CHANGE UP (ref 0–0)
PHOSPHATE SERPL-MCNC: 2.6 MG/DL — SIGNIFICANT CHANGE UP (ref 2.1–4.9)
PHOSPHATE SERPL-MCNC: 2.6 MG/DL — SIGNIFICANT CHANGE UP (ref 2.1–4.9)
PLATELET # BLD AUTO: 112 K/UL — LOW (ref 130–400)
PLATELET # BLD AUTO: 112 K/UL — LOW (ref 130–400)
PMV BLD: 11.9 FL — HIGH (ref 7.4–10.4)
PMV BLD: 11.9 FL — HIGH (ref 7.4–10.4)
POTASSIUM SERPL-MCNC: 4 MMOL/L — SIGNIFICANT CHANGE UP (ref 3.5–5)
POTASSIUM SERPL-SCNC: 4 MMOL/L — SIGNIFICANT CHANGE UP (ref 3.5–5)
PROT SERPL-MCNC: 6 G/DL — SIGNIFICANT CHANGE UP (ref 6–8)
RBC # BLD: 4.44 M/UL — LOW (ref 4.7–6.1)
RBC # BLD: 4.44 M/UL — LOW (ref 4.7–6.1)
RBC # FLD: 12.7 % — SIGNIFICANT CHANGE UP (ref 11.5–14.5)
RBC # FLD: 12.7 % — SIGNIFICANT CHANGE UP (ref 11.5–14.5)
SODIUM SERPL-SCNC: 139 MMOL/L — SIGNIFICANT CHANGE UP (ref 135–146)
SODIUM SERPL-SCNC: 139 MMOL/L — SIGNIFICANT CHANGE UP (ref 135–146)
SODIUM SERPL-SCNC: 141 MMOL/L — SIGNIFICANT CHANGE UP (ref 135–146)
SODIUM SERPL-SCNC: 141 MMOL/L — SIGNIFICANT CHANGE UP (ref 135–146)
WBC # BLD: 6.02 K/UL — SIGNIFICANT CHANGE UP (ref 4.8–10.8)
WBC # BLD: 6.02 K/UL — SIGNIFICANT CHANGE UP (ref 4.8–10.8)
WBC # FLD AUTO: 6.02 K/UL — SIGNIFICANT CHANGE UP (ref 4.8–10.8)
WBC # FLD AUTO: 6.02 K/UL — SIGNIFICANT CHANGE UP (ref 4.8–10.8)

## 2023-11-23 PROCEDURE — 99232 SBSQ HOSP IP/OBS MODERATE 35: CPT

## 2023-11-23 RX ORDER — INFLUENZA VIRUS VACCINE 15; 15; 15; 15 UG/.5ML; UG/.5ML; UG/.5ML; UG/.5ML
0.5 SUSPENSION INTRAMUSCULAR ONCE
Refills: 0 | Status: DISCONTINUED | OUTPATIENT
Start: 2023-11-23 | End: 2023-11-24

## 2023-11-23 RX ADMIN — GABAPENTIN 300 MILLIGRAM(S): 400 CAPSULE ORAL at 21:17

## 2023-11-23 RX ADMIN — ENOXAPARIN SODIUM 40 MILLIGRAM(S): 100 INJECTION SUBCUTANEOUS at 05:19

## 2023-11-23 RX ADMIN — RISPERIDONE 1 MILLIGRAM(S): 4 TABLET ORAL at 16:57

## 2023-11-23 RX ADMIN — GABAPENTIN 300 MILLIGRAM(S): 400 CAPSULE ORAL at 05:18

## 2023-11-23 RX ADMIN — RISPERIDONE 1 MILLIGRAM(S): 4 TABLET ORAL at 05:18

## 2023-11-23 RX ADMIN — CHLORHEXIDINE GLUCONATE 1 APPLICATION(S): 213 SOLUTION TOPICAL at 05:19

## 2023-11-23 RX ADMIN — GABAPENTIN 300 MILLIGRAM(S): 400 CAPSULE ORAL at 16:56

## 2023-11-23 NOTE — CONSULT NOTE ADULT - ASSESSMENT
IMPRESSION: Rehab of fall with stable C7 fracture treated with hard collar; Left ankle Medial Malleolar fracture. No activity orders or wt. bearing status noted.    PRECAUTIONS: [  ] Cardiac  [  ] Respiratory  [  ] Seizures [  ] Contact Isolation  [  ] Droplet Isolation  [  ] Other    Weight Bearing Status: left ankle status unclear. No ortho consult or wt bearing specification in notes/ no activity orders    RECOMMENDATION:    Out of Bed to Chair     DVT/Decubiti Prophylaxis    REHAB PLAN:  after activity/ wt bearing status clarified:    [ x  ] Bedside P/T 3-5 times a week   [   ]   Bedside O/T  2-3 times a week             [   ] No Rehab Therapy Indicated                   [   ]  Speech Therapy   Conditioning/ROM                                    ADL  Bed Mobility                                               Conditioning/ROM  Transfers                                                     Bed Mobility  Sitting /Standing Balance                         Transfers                                        Gait Training                                               Sitting/Standing Balance  Stair Training [   ]Applicable                    Home equipment Eval                                                                        Splinting  [   ] Only      GOALS:   ADL   [ x  ]   Independent                    Transfers  [ x  ] Independent                          Ambulation  [ x  ] Independent     [ x   ] With device                            [   ]  CG                                                         [   ]  CG                                                                  [   ] CG                            [    ] Min A                                                   [   ] Min A                                                              [   ] Min  A          DISCHARGE PLAN:   [   ]  Good candidate for Intensive Rehabilitation/Hospital based-[facility]A [facility]                                             Will tolerate 3hrs Intensive Rehab Daily                                       [    ]  Short Term Rehab in Skilled Nursing Facility                                       [   xx ]  Home with Outpatient or VN services                                         [    ]  Possible Candidate for Intensive Hospital based Rehab

## 2023-11-23 NOTE — PATIENT PROFILE ADULT - FALL HARM RISK - HARM RISK INTERVENTIONS
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
## 2023-11-24 ENCOUNTER — TRANSCRIPTION ENCOUNTER (OUTPATIENT)
Age: 53
End: 2023-11-24

## 2023-11-24 VITALS
HEART RATE: 62 BPM | SYSTOLIC BLOOD PRESSURE: 102 MMHG | TEMPERATURE: 96 F | DIASTOLIC BLOOD PRESSURE: 62 MMHG | OXYGEN SATURATION: 98 % | RESPIRATION RATE: 18 BRPM

## 2023-11-24 PROCEDURE — 99239 HOSP IP/OBS DSCHRG MGMT >30: CPT

## 2023-11-24 RX ADMIN — RISPERIDONE 1 MILLIGRAM(S): 4 TABLET ORAL at 05:19

## 2023-11-24 RX ADMIN — CHLORHEXIDINE GLUCONATE 1 APPLICATION(S): 213 SOLUTION TOPICAL at 05:21

## 2023-11-24 RX ADMIN — ENOXAPARIN SODIUM 40 MILLIGRAM(S): 100 INJECTION SUBCUTANEOUS at 05:19

## 2023-11-24 RX ADMIN — GABAPENTIN 300 MILLIGRAM(S): 400 CAPSULE ORAL at 05:18

## 2023-11-24 NOTE — DISCHARGE NOTE PROVIDER - NSDCMRMEDTOKEN_GEN_ALL_CORE_FT
albuterol 90 mcg/inh inhalation aerosol: 2 puff(s) inhaled every 6 hours x 14 days, As Needed -wheezing/shortness of breath Continue to take as needed until told otherwise by your provider  gabapentin 300 mg oral capsule: 1 cap(s) orally 3 times a day x 14 days Continue to take as prescribed until told otherwise by your provider  gabapentin 300 mg oral capsule: 1 cap(s) orally every 8 hours   risperiDONE 1 mg oral tablet: 1 tab(s) orally 2 times a day x 14 days Continue to take as prescribed until told otherwise by your provider  Vistaril 50 mg oral capsule: 1 cap(s) orally every 8 hours    albuterol 90 mcg/inh inhalation aerosol: 2 puff(s) inhaled every 6 hours x 14 days, As Needed -wheezing/shortness of breath Continue to take as needed until told otherwise by your provider  gabapentin 300 mg oral capsule: 1 cap(s) orally every 8 hours   risperiDONE 1 mg oral tablet: 1 tab(s) orally 2 times a day x 14 days Continue to take as prescribed until told otherwise by your provider  Vistaril 50 mg oral capsule: 1 cap(s) orally every 8 hours

## 2023-11-24 NOTE — PHYSICAL THERAPY INITIAL EVALUATION ADULT - GENERAL OBSERVATIONS, REHAB EVAL
[id number] pm Chart reviewed. Pt. seen seated at edge of bed , in No apparent distress , +[place] J collar,  IV lock, Pt. alert and oriented X 3, Pt. agreed to activity/therapy.

## 2023-11-24 NOTE — PHYSICAL THERAPY INITIAL EVALUATION ADULT - SPECIFY REASON(S)
850 am PT unable to proceed to assess pt without weight-bearing orders on the left LE (avulsed malleolus). Will f/u as appropriate.
18
Upon assessment pt is independent wuth transfers and ambulation ( using axillary crutches, while observing NWB on the left LE).

## 2023-11-24 NOTE — DISCHARGE NOTE PROVIDER - PROVIDER TOKENS
PROVIDER:[TOKEN:[[id number]],FOLLOWUP:[2 weeks]],PROVIDER:[TOKEN:[[id number]],FOLLOWUP:[2 weeks]]

## 2023-11-24 NOTE — DISCHARGE NOTE NURSING/CASE MANAGEMENT/SOCIAL WORK - PATIENT PORTAL LINK FT
You can access the FollowMyHealth Patient Portal offered by [facility] by registering at the following website: [email or web address]. By joining [employer]’s FollowMyHealth portal, you will also be able to view your health information using other applications (apps) compatible with our system.
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
## 2023-11-24 NOTE — DISCHARGE NOTE PROVIDER - NSDCCPCAREPLAN_GEN_ALL_CORE_FT
PRINCIPAL DISCHARGE DIAGNOSIS  Diagnosis: Closed C7 fracture  Assessment and Plan of Treatment: You were seen in the emergency department on November 22, 2023 after a fall that resulted in injuries to your face, neck, and ankle. Imaging showed a fracture in your C7 neck vertebrae as well as an avulsion fracture in your left ankle.  While in the hospital, you were fitted with a [place] J cervical collar to immobilize your neck and a splint was applied to your left ankle. Your pain was managed with medication. The neurosurgery team evaluated you and determined that no acute surgical intervention is needed at this time for your spinal fracture. They recommend that you follow up with Dr. Mandujano in 2 weeks. His office will contact you to schedule this appointment, but if you need to call, their number is [phone].  Physical therapy and rehabilitation medicine consultations have been placed to help assist you with mobility in light of your ankle fracture. For now, you are being discharged home with recommendations to use crutches/avoid bearing weight on your left leg given the avulsion fracture. Outpatient nursing services can provide assistance as well during your recovery.      SECONDARY DISCHARGE DIAGNOSES  Diagnosis: Closed avulsion fracture of ankle, initial encounter  Assessment and Plan of Treatment:

## 2023-11-24 NOTE — DISCHARGE NOTE PROVIDER - CARE PROVIDERS DIRECT ADDRESSES
[email or web address]

## 2023-11-24 NOTE — DISCHARGE NOTE PROVIDER - CARE PROVIDER_API CALL
Anurag Mandujano  Neurosurgery  [address], [address]  [place] [address]  Phone: [phone]  Fax: [phone]  Follow Up Time: 2 weeks    Levi Henry  Internal Medicine  [address] [place] [address]  Phone: [phone]  Fax: [phone]  Follow Up Time: 2 weeks

## 2023-11-24 NOTE — DISCHARGE NOTE NURSING/CASE MANAGEMENT/SOCIAL WORK - NSTOBACCONEVERSMOKERY/N_GEN_A
Has The Growth Been Previously Biopsied?: has been previously biopsied
Body Location Override (Optional): left lateral inferior eyelid
Yes

## 2023-11-24 NOTE — DISCHARGE NOTE NURSING/CASE MANAGEMENT/SOCIAL WORK - NSDCVIVACCINE_GEN_ALL_CORE_FT
Tdap; 02-Mar-2022 08:06; Chiquis Vaughn (RN); [employer]; [id number] (Exp. Date: 28-Sep-2023); IntraMuscular; Deltoid Right.; 0.5 milliLiter(s); VIS (VIS Published: 09-May-2013, VIS Presented: 02-Mar-2022);   Tdap; 01-Nov-2022 07:48; Joe Correia (RN); [employer]; [id number] (Exp. Date: 08-Dec-2024); IntraMuscular; Deltoid Right.; 0.5 milliLiter(s); VIS (VIS Published: 09-May-2013, VIS Presented: 01-Nov-2022);   Tdap; 22-Nov-2023 03:49; Josefina Orona (RN); [employer]; [id number]   (Exp. Date: 23-Nov-2025); IntraMuscular; Deltoid Left.; 0.5 milliLiter(s); VIS (VIS Published: 09-May-2013, VIS Presented: 22-Nov-2023);

## 2023-11-28 ENCOUNTER — EMERGENCY (EMERGENCY)
Facility: HOSPITAL | Age: 53
LOS: 0 days | Discharge: HOME | End: 2023-11-29
Attending: EMERGENCY MEDICINE
Payer: MEDICAID

## 2023-11-28 VITALS
OXYGEN SATURATION: 97 % | RESPIRATION RATE: 20 BRPM | SYSTOLIC BLOOD PRESSURE: 105 MMHG | HEIGHT: 71 IN | TEMPERATURE: 96 F | WEIGHT: 149.91 LBS | HEART RATE: 76 BPM | DIASTOLIC BLOOD PRESSURE: 74 MMHG

## 2023-11-28 DIAGNOSIS — Z98.890 OTHER SPECIFIED POSTPROCEDURAL STATES: Chronic | ICD-10-CM

## 2023-11-28 DIAGNOSIS — F10.10 ALCOHOL ABUSE, UNCOMPLICATED: ICD-10-CM

## 2023-11-28 DIAGNOSIS — R22.0 LOCALIZED SWELLING, MASS AND LUMP, HEAD: ICD-10-CM

## 2023-11-28 PROCEDURE — 72125 CT NECK SPINE W/O DYE: CPT | Mod: MA

## 2023-11-28 PROCEDURE — 99284 EMERGENCY DEPT VISIT MOD MDM: CPT | Mod: 25

## 2023-11-28 PROCEDURE — 70450 CT HEAD/BRAIN W/O DYE: CPT | Mod: MA

## 2023-11-28 PROCEDURE — 82962 GLUCOSE BLOOD TEST: CPT

## 2023-11-28 PROCEDURE — 99284 EMERGENCY DEPT VISIT MOD MDM: CPT
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
## 2023-11-29 DIAGNOSIS — S82.52XA DISPLACED FRACTURE OF MEDIAL MALLEOLUS OF LEFT TIBIA, INITIAL ENCOUNTER FOR CLOSED FRACTURE: ICD-10-CM

## 2023-11-29 DIAGNOSIS — F10.10 ALCOHOL ABUSE, UNCOMPLICATED: ICD-10-CM

## 2023-11-29 DIAGNOSIS — F17.210 NICOTINE DEPENDENCE, CIGARETTES, UNCOMPLICATED: ICD-10-CM

## 2023-11-29 DIAGNOSIS — Z91.198 PATIENT'S NONCOMPLIANCE WITH OTHER MEDICAL TREATMENT AND REGIMEN FOR OTHER REASON: ICD-10-CM

## 2023-11-29 DIAGNOSIS — Z91.018 ALLERGY TO OTHER FOODS: ICD-10-CM

## 2023-11-29 DIAGNOSIS — F42.9 OBSESSIVE-COMPULSIVE DISORDER, UNSPECIFIED: ICD-10-CM
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
## 2023-11-29 DIAGNOSIS — S12.690A OTHER DISPLACED FRACTURE OF SEVENTH CERVICAL VERTEBRA, INITIAL ENCOUNTER FOR CLOSED FRACTURE: ICD-10-CM

## 2023-11-29 DIAGNOSIS — F20.9 SCHIZOPHRENIA, UNSPECIFIED: ICD-10-CM

## 2023-11-29 LAB
GLUCOSE BLDC GLUCOMTR-MCNC: 89 MG/DL — SIGNIFICANT CHANGE UP (ref 70–99)
GLUCOSE BLDC GLUCOMTR-MCNC: 89 MG/DL — SIGNIFICANT CHANGE UP (ref 70–99)

## 2023-11-29 PROCEDURE — 72125 CT NECK SPINE W/O DYE: CPT | Mod: 26,MA

## 2023-11-29 PROCEDURE — 70450 CT HEAD/BRAIN W/O DYE: CPT | Mod: 26,MA
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
## 2023-11-29 NOTE — ED PROVIDER NOTE - PATIENT PORTAL LINK FT
You can access the FollowMyHealth Patient Portal offered by [facility] by registering at the following website: [email or web address]. By joining [facility]’s FollowMyHealth portal, you will also be able to view your health information using other applications (apps) compatible with our system.

## 2023-11-29 NOTE — ED PROVIDER NOTE - NSDCPRINTRESULTS_ED_ALL_ED
17-Aug-2019 04:57
Patient requests all Lab, Cardiology, and Radiology Results on their Discharge Instructions

## 2023-11-29 NOTE — ED PROVIDER NOTE - PHYSICAL EXAMINATION
Physical Exam    Vital Signs: I have reviewed the initial vital signs.  Constitutional: well-nourished, appears stated age, no acute distress  Eyes: Conjunctiva pink, Sclera clear, PERRLA, EOMI.  Cardiovascular: S1 and S2, regular rate, regular rhythm, well-perfused extremities, radial pulses equal and 2+  Respiratory: unlabored respiratory effort, clear to auscultation bilaterally no wheezing, rales and rhonchi  Gastrointestinal: soft, non-tender abdomen, no pulsatile mass, normal bowl sounds  Musculoskeletal: supple neck, no lower extremity edema, no midline tenderness  Integumentary: warm, dry, no rash + swelling to forehead right side.   Neurologic: awake, alert, moving all extremities

## 2023-11-29 NOTE — ED PROVIDER NOTE - NSFOLLOWUPINSTRUCTIONS_ED_ALL_ED_FT
ALCOHOL DEPENDENCE - General Information     Alcohol Dependence    WHAT YOU NEED TO KNOW:    What is alcohol dependence? Alcohol dependence is the need to drink alcohol often to function in your daily life. You often drink large amounts of alcohol. Alcohol dependence is also known as alcoholism or alcohol use disorder. Alcoholism is a disease that can affect almost every part of your body.    What behaviors are common with alcohol dependence?     You keep drinking alcohol even if you know it increases your risk for health problems. Health problems include liver problems, stomach ulcers, high blood pressure, and stroke.      You develop a tolerance for alcohol. Tolerance means the amount of alcohol you usually drink no longer causes the effects you desire. You may need to drink even more alcohol to get its previous effects.      You put extra effort and time into drinking alcohol. You may often go to events or activities that will include drinking. You may also spend much of your time drinking alcohol or being with people who also drink.      You have withdrawal (physical or mental) symptoms after not drinking for a short period. The same amount of alcohol may be needed to relieve or prevent withdrawal symptoms. You may also have to drink to stop tremors (shakes) or to cure a hangover.      You crave alcohol. You may have a desire to drink more frequently and to drink larger amounts of alcohol.      You have problems decreasing or controlling alcohol use. You are not able to control your drinking habits. You keep going back to drinking even after you quit.      You spend less time doing more important things. You have trouble with social or daily activities at school, work, or home.    What increases my risk for alcohol dependence?     Family history      Depression or anxiety      Other substance abuse      Childhood trauma      Posttraumatic stress disorder      Other disorders, such as antisocial personality disorder and bipolar disorder    How is alcohol dependence treated? Your healthcare provider may admit you to the hospital to make sure you withdraw safely. Then you may need any of the following:    Medicines to decrease your craving for alcohol      Support groups such as Alcoholics Anonymous       Psychiatrist or psychologist for therapy       Admission to an inpatient facility for treatment for severe dependence    Where can I get more information about alcohol dependence?       [employer] [facility]  [address] [place], [place] [id number]  Phone: [phone]  Phone: 1-342.919.9518  Web Address: http://www.ncadd.org            Alcoholics Anonymous  Web Address: http://www.aa.org      CARE AGREEMENT:    You have the right to help plan your care. Learn about your health condition and how it may be treated. Discuss treatment options with your healthcare providers to decide what care you want to receive. You always have the right to refuse treatment

## 2024-02-15 NOTE — ED ADULT NURSE NOTE - NSIMPLEMENTINTERV_GEN_ALL_ED
lvmtcb   Implemented All Universal Safety Interventions:  [place] to call system. Call bell, personal items and telephone within reach. Instruct patient to call for assistance. Room bathroom lighting operational. Non-slip footwear when patient is off stretcher. Physically safe environment: no spills, clutter or unnecessary equipment. Stretcher in lowest position, wheels locked, appropriate side rails in place.

## 2024-03-11 NOTE — PATIENT PROFILE BEHAVIORAL HEALTH - NSSUBSTANCEUSE_GEN_ALL_CORE_SD
I have personally provided the amount of critical care time documented below concurrently with the resident/fellow.  This time excludes time spent on separate procedures and time spent teaching. I have reviewed the resident’s / fellow’s documentation and I agree with the history, exam, and assessment and plan of care. street drug/inhalant/medication abuse

## 2024-04-14 ENCOUNTER — EMERGENCY (EMERGENCY)
Facility: HOSPITAL | Age: 54
LOS: 0 days | Discharge: HOME | End: 2024-04-15
Attending: EMERGENCY MEDICINE
Payer: MEDICAID

## 2024-04-14 VITALS
RESPIRATION RATE: 20 BRPM | HEART RATE: 91 BPM | TEMPERATURE: 97 F | DIASTOLIC BLOOD PRESSURE: 58 MMHG | WEIGHT: 160.06 LBS | SYSTOLIC BLOOD PRESSURE: 103 MMHG | OXYGEN SATURATION: 97 %

## 2024-04-14 DIAGNOSIS — F10.929 ALCOHOL USE, UNSPECIFIED WITH INTOXICATION, UNSPECIFIED: ICD-10-CM

## 2024-04-14 DIAGNOSIS — Z98.890 OTHER SPECIFIED POSTPROCEDURAL STATES: Chronic | ICD-10-CM

## 2024-04-14 LAB
ALBUMIN SERPL ELPH-MCNC: 4.6 G/DL — SIGNIFICANT CHANGE UP (ref 3.5–5.2)
ALP SERPL-CCNC: 77 U/L — SIGNIFICANT CHANGE UP (ref 30–115)
ALT FLD-CCNC: 15 U/L — SIGNIFICANT CHANGE UP (ref 0–41)
ANION GAP SERPL CALC-SCNC: 14 MMOL/L — SIGNIFICANT CHANGE UP (ref 7–14)
AST SERPL-CCNC: 32 U/L — SIGNIFICANT CHANGE UP (ref 0–41)
BASOPHILS # BLD AUTO: 0.08 K/UL — SIGNIFICANT CHANGE UP (ref 0–0.2)
BASOPHILS NFR BLD AUTO: 0.9 % — SIGNIFICANT CHANGE UP (ref 0–1)
BILIRUB SERPL-MCNC: 0.4 MG/DL — SIGNIFICANT CHANGE UP (ref 0.2–1.2)
BUN SERPL-MCNC: 16 MG/DL — SIGNIFICANT CHANGE UP (ref 10–20)
CALCIUM SERPL-MCNC: 9.3 MG/DL — SIGNIFICANT CHANGE UP (ref 8.4–10.5)
CHLORIDE SERPL-SCNC: 107 MMOL/L — SIGNIFICANT CHANGE UP (ref 98–110)
CO2 SERPL-SCNC: 24 MMOL/L — SIGNIFICANT CHANGE UP (ref 17–32)
CREAT SERPL-MCNC: 0.9 MG/DL — SIGNIFICANT CHANGE UP (ref 0.7–1.5)
EGFR: 104 ML/MIN/1.73M2 — SIGNIFICANT CHANGE UP
EOSINOPHIL # BLD AUTO: 0.47 K/UL — SIGNIFICANT CHANGE UP (ref 0–0.7)
EOSINOPHIL NFR BLD AUTO: 5.5 % — SIGNIFICANT CHANGE UP (ref 0–8)
ETHANOL SERPL-MCNC: 322 MG/DL — HIGH
GLUCOSE SERPL-MCNC: 88 MG/DL — SIGNIFICANT CHANGE UP (ref 70–99)
HCT VFR BLD CALC: 43.7 % — SIGNIFICANT CHANGE UP (ref 42–52)
HGB BLD-MCNC: 15.1 G/DL — SIGNIFICANT CHANGE UP (ref 14–18)
IMM GRANULOCYTES NFR BLD AUTO: 0.2 % — SIGNIFICANT CHANGE UP (ref 0.1–0.3)
LYMPHOCYTES # BLD AUTO: 3.14 K/UL — SIGNIFICANT CHANGE UP (ref 1.2–3.4)
LYMPHOCYTES # BLD AUTO: 36.9 % — SIGNIFICANT CHANGE UP (ref 20.5–51.1)
MCHC RBC-ENTMCNC: 32.7 PG — HIGH (ref 27–31)
MCHC RBC-ENTMCNC: 34.6 G/DL — SIGNIFICANT CHANGE UP (ref 32–37)
MCV RBC AUTO: 94.6 FL — HIGH (ref 80–94)
MONOCYTES # BLD AUTO: 0.52 K/UL — SIGNIFICANT CHANGE UP (ref 0.1–0.6)
MONOCYTES NFR BLD AUTO: 6.1 % — SIGNIFICANT CHANGE UP (ref 1.7–9.3)
NEUTROPHILS # BLD AUTO: 4.27 K/UL — SIGNIFICANT CHANGE UP (ref 1.4–6.5)
NEUTROPHILS NFR BLD AUTO: 50.4 % — SIGNIFICANT CHANGE UP (ref 42.2–75.2)
NRBC # BLD: 0 /100 WBCS — SIGNIFICANT CHANGE UP (ref 0–0)
PLATELET # BLD AUTO: 168 K/UL — SIGNIFICANT CHANGE UP (ref 130–400)
PMV BLD: 11.3 FL — HIGH (ref 7.4–10.4)
POTASSIUM SERPL-MCNC: 4 MMOL/L — SIGNIFICANT CHANGE UP (ref 3.5–5)
POTASSIUM SERPL-SCNC: 4 MMOL/L — SIGNIFICANT CHANGE UP (ref 3.5–5)
PROT SERPL-MCNC: 7.4 G/DL — SIGNIFICANT CHANGE UP (ref 6–8)
RBC # BLD: 4.62 M/UL — LOW (ref 4.7–6.1)
RBC # FLD: 12.5 % — SIGNIFICANT CHANGE UP (ref 11.5–14.5)
SODIUM SERPL-SCNC: 145 MMOL/L — SIGNIFICANT CHANGE UP (ref 135–146)
WBC # BLD: 8.5 K/UL — SIGNIFICANT CHANGE UP (ref 4.8–10.8)
WBC # FLD AUTO: 8.5 K/UL — SIGNIFICANT CHANGE UP (ref 4.8–10.8)

## 2024-04-14 PROCEDURE — 72125 CT NECK SPINE W/O DYE: CPT | Mod: 26,MC

## 2024-04-14 PROCEDURE — 80353 DRUG SCREENING COCAINE: CPT

## 2024-04-14 PROCEDURE — 93005 ELECTROCARDIOGRAM TRACING: CPT

## 2024-04-14 PROCEDURE — 96372 THER/PROPH/DIAG INJ SC/IM: CPT | Mod: XU

## 2024-04-14 PROCEDURE — 70450 CT HEAD/BRAIN W/O DYE: CPT | Mod: 26,MC

## 2024-04-14 PROCEDURE — 85018 HEMOGLOBIN: CPT

## 2024-04-14 PROCEDURE — 82803 BLOOD GASES ANY COMBINATION: CPT

## 2024-04-14 PROCEDURE — 82330 ASSAY OF CALCIUM: CPT

## 2024-04-14 PROCEDURE — 72125 CT NECK SPINE W/O DYE: CPT | Mod: MC

## 2024-04-14 PROCEDURE — 85014 HEMATOCRIT: CPT

## 2024-04-14 PROCEDURE — 96374 THER/PROPH/DIAG INJ IV PUSH: CPT | Mod: XU

## 2024-04-14 PROCEDURE — 70450 CT HEAD/BRAIN W/O DYE: CPT | Mod: MC

## 2024-04-14 PROCEDURE — 70486 CT MAXILLOFACIAL W/O DYE: CPT | Mod: MC

## 2024-04-14 PROCEDURE — 99285 EMERGENCY DEPT VISIT HI MDM: CPT

## 2024-04-14 PROCEDURE — 99285 EMERGENCY DEPT VISIT HI MDM: CPT | Mod: 25

## 2024-04-14 PROCEDURE — 71045 X-RAY EXAM CHEST 1 VIEW: CPT | Mod: 26

## 2024-04-14 PROCEDURE — 70486 CT MAXILLOFACIAL W/O DYE: CPT | Mod: 26,MC

## 2024-04-14 PROCEDURE — 71045 X-RAY EXAM CHEST 1 VIEW: CPT

## 2024-04-14 PROCEDURE — 83690 ASSAY OF LIPASE: CPT

## 2024-04-14 PROCEDURE — 83605 ASSAY OF LACTIC ACID: CPT

## 2024-04-14 PROCEDURE — 74177 CT ABD & PELVIS W/CONTRAST: CPT | Mod: MC

## 2024-04-14 PROCEDURE — 80346 BENZODIAZEPINES1-12: CPT

## 2024-04-14 PROCEDURE — 80053 COMPREHEN METABOLIC PANEL: CPT

## 2024-04-14 PROCEDURE — 71260 CT THORAX DX C+: CPT | Mod: 26,MC

## 2024-04-14 PROCEDURE — 36415 COLL VENOUS BLD VENIPUNCTURE: CPT

## 2024-04-14 PROCEDURE — 82962 GLUCOSE BLOOD TEST: CPT

## 2024-04-14 PROCEDURE — 71260 CT THORAX DX C+: CPT | Mod: MC

## 2024-04-14 PROCEDURE — 80307 DRUG TEST PRSMV CHEM ANLYZR: CPT

## 2024-04-14 PROCEDURE — 99284 EMERGENCY DEPT VISIT MOD MDM: CPT

## 2024-04-14 PROCEDURE — 84295 ASSAY OF SERUM SODIUM: CPT

## 2024-04-14 PROCEDURE — 85025 COMPLETE CBC W/AUTO DIFF WBC: CPT

## 2024-04-14 PROCEDURE — 87086 URINE CULTURE/COLONY COUNT: CPT

## 2024-04-14 PROCEDURE — 81003 URINALYSIS AUTO W/O SCOPE: CPT

## 2024-04-14 PROCEDURE — 84132 ASSAY OF SERUM POTASSIUM: CPT

## 2024-04-14 PROCEDURE — 96375 TX/PRO/DX INJ NEW DRUG ADDON: CPT | Mod: XU

## 2024-04-14 PROCEDURE — 74177 CT ABD & PELVIS W/CONTRAST: CPT | Mod: 26,MC

## 2024-04-14 RX ORDER — HALOPERIDOL DECANOATE 100 MG/ML
5 INJECTION INTRAMUSCULAR ONCE
Refills: 0 | Status: COMPLETED | OUTPATIENT
Start: 2024-04-14 | End: 2024-04-14

## 2024-04-14 RX ORDER — MIDAZOLAM HYDROCHLORIDE 1 MG/ML
5 INJECTION, SOLUTION INTRAMUSCULAR; INTRAVENOUS ONCE
Refills: 0 | Status: DISCONTINUED | OUTPATIENT
Start: 2024-04-14 | End: 2024-04-14

## 2024-04-14 RX ADMIN — HALOPERIDOL DECANOATE 5 MILLIGRAM(S): 100 INJECTION INTRAMUSCULAR at 22:41

## 2024-04-14 RX ADMIN — MIDAZOLAM HYDROCHLORIDE 5 MILLIGRAM(S): 1 INJECTION, SOLUTION INTRAMUSCULAR; INTRAVENOUS at 22:40

## 2024-04-14 NOTE — DISCHARGE NOTE NURSING/CASE MANAGEMENT/SOCIAL WORK - NSDCFUADDAPPT_GEN_ALL_CORE_FT
actual Please follow up with the primary care doctor within 2 weeks after discharge  Please follow up with Dr. Mandujano within 2 weeks after discharge

## 2024-04-14 NOTE — ED PROVIDER NOTE - PHYSICAL EXAMINATION
CONST: aggressive, spitting at staff  HEAD:  normocephalic, atraumatic  EYES:  conjunctivae without injection, drainage or discharge  ENMT: nasal mucosa moist; mouth moist without ulcerations or lesions; throat moist without erythema, exudate, ulcerations or lesions  NECK:  supple, no midline tenderness, full ROM  CARDIAC:  regular rate and rhythm, normal S1 and S2, no murmurs, rubs or gallops  RESP:  respiratory rate and effort appear normal for age; lungs are clear to auscultation bilaterally; no rales or wheezes  ABDOMEN:  soft, nontender, nondistended  MUSCULOSKELETAL/NEURO:  agitated, aggressive, fighting staff, normal movement, normal tone  SKIN:  normal skin color for age and race, well-perfused; warm and dry
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
## 2024-04-14 NOTE — ED PROVIDER NOTE - PATIENT PORTAL LINK FT
You can access the FollowMyHealth Patient Portal offered by [facility] by registering at the following website: [email or web address]. By joining [employer]’s FollowMyHealth portal, you will also be able to view your health information using other applications (apps) compatible with our system.

## 2024-04-14 NOTE — ED PROVIDER NOTE - PROGRESS NOTE DETAILS
CLEMENTE: Case endorsed to Dr. Fuentes pending rockwell scan, remainder of labs, re-eval, dispo. Authored by Dr. Raffy Fuentes ct of cervical spine noted, being that patient is sedation trauma consult and neurosx consult placed PS: Pt seen by neurosurgery and cleared. Pending trauma eval Authored by Dr. Raffy Fuentes pt s.o to dr. carrillo pending trauma, pt reassessment and psych Ng: Pt tired appearing, arrousable to deep stimulus, pupils 1.5-2 narcan given with improvement of symptoms.

## 2024-04-14 NOTE — ED ADULT TRIAGE NOTE - ESI TRIAGE ACUITY LEVEL, MLM
[facility]   Outpatient Sleep Medicine  Jun 7, 2023       Name: Fausto Farr MRN# [id number]   Age: 80 year old YOB: 1943            Assessment and Plan:   1. Obstructive sleep apnea (adult) (pediatric)  Patient's sleep apnea is adequately managed and well treated with current PAP settings [id number] with low residual AHI of 0.9 events per hour. Compliance is good. Tolerating PAP well.  No indication to adjust pressure settings today.  Prescription was updated for new mask and supplies.  Discussed prescribing him a replacement CPAP machine today given his is over 5 years of age so eligible, but he is not having any issues with his current machine so he politely declined at this time, could consider next year or sooner if any concerns with his machine pop up.  Encouraged him to use his machine for the entirety of sleep duration for maximal benefits, he will work to put the mask on after using the bathroom in the middle the night to increase his usage hours.  We will plan to see him back in about 1 year for his annual PAP visit or sooner as needed.  - Comprehensive DME       Chief Complaint      Chief Complaint   Patient presents with     Sleep Problem     CPAP follow up. Discuss new device.          History of Present Illness:     Fausto Farr is a 80-year-old male with atrial fibrillation, HTN, CAD, CHF, AAA, T2DM who presents to clinic today for follow-up regarding his moderate obstructive sleep apnea treated with CPAP therapy.    Originally diagnosed via split-night PSG completed 8/9/2016 (216#, BMI 36) revealing moderate GAGE with hypoxemia-AHI 18.8, RDI 18.8, REM AHI 26.2, supine AHI 31.4, oxygen riya 82.1%, 11.3 minutes spent less than or equal to 88%.  CPAP was titrated to 7 cm H2O which was effective.  PLM index 8.1 with arousal index 0 during diagnostic portion of testing, 0 PLM's were recorded during treatment portion of study.  Patient was set up with his current  "ResMed 10 auto CPAP set to 6-16 cm H2O on 10/2/2017.    Patient was last seen by Dr. Dobbins in January 2021.  Presents to clinic today for routine CPAP follow-up visit. Current PAP machine working well but wonders about new machine. Patient is using a nasal pillow mask. The mask is comfortable. The mask is not leaking. They are not snoring with the mask on. They are not having gasp arousals.  They are not having significant oral/nasal dryness or epistaxis.  They are not having pain/skin breakdown. The pressure settings are comfortable.     Bedtime is typically 10:30PM and wake time is typically between 7-7:30AM. No issues falling asleep with mask on. Admits that he often does not put mask back on if he wakes to use the restroom in middle of night.     ResMed Auto-PAP 6-16 cmH2O download:  30 total days of use. 0 nonuse days. 27 days with >4 hours use.  Average use 6 hours 10 minutes per day. Median Leak 11.6 L/min. 95%ile Leak 31.6 L/min. CPAP 95% pressure 9.6cm. AHI 0.9    SCALES:   INSOMNIA: Insomnia Severity Score: 1   SLEEPINESS: Pimento Sleepiness Score: 9    Past medical/surgical history, family history, social history, medications and allergies were reviewed.           Physical Examination:   /74   Pulse 71   Ht 1.651 m (5' 5\")   Wt 97.3 kg (214 lb 6.4 oz)   SpO2 95%   BMI 35.68 kg/m    General appearance: Awake, alert, cooperative. Well groomed. Sitting comfortably in chair. In no apparent distress.  HEENT: Head: Normocephalic, atraumatic. Eyes:Conjunctiva clear. Sclera normal. Nose: External appearance without deformity.   Pulmonary:  Able to speak easily in full sentences. No cough or wheeze.   Skin:  No rashes or significant lesions on visible skin.   Neurologic: Alert, oriented x3.   Psychiatric: Mood euthymic. Affect congruent with full range and intensity.      CC:  Jodi Flower Mai, PA-C  Jun 7, 2023     [facility]  [address] , " [place] [address]     [facility]  [address], [place] [address]    Chart documentation was completed, in part, with [employer] voice-recognition software. Even though reviewed, some grammatical, spelling, and word errors may remain.    31 minutes spent on day of encounter doing chart review, history and exam, documentation, and further activities as noted above     2

## 2024-04-14 NOTE — ED PROVIDER NOTE - CARE PROVIDER_API CALL
Anurag Mandujano  Neurosurgery  [address] [place] [address], [address]  [place] [address]  Phone: [phone]  Fax: [phone]  Follow Up Time: 1-3 Days

## 2024-04-14 NOTE — ED PROVIDER NOTE - DIFFERENTIAL DIAGNOSIS
Differential Diagnosis The differential diagnosis for patients clinical presentation includes but is not limited to:  Alcohol intoxication, fracture, SAH, CHI, colitis, pancreatitis, UTI, infection, Diverticulitis, SBO, biliary colic, cholecystitis, pyelonephritis, aortic dissection

## 2024-04-14 NOTE — ED PROVIDER NOTE - CONSIDERATION OF ADMISSION OBSERVATION
Consideration of Admission/Observation I considered admission for this patient. They improved in the Emergency Department and shared decision making utilized. Will attempt outpatient management. Patient is a good candidate to attempt outpatient management. Supportive care and home care discussed in detail. Patient aware they may have to return for re-evaluation and possible admission if outpatient treatment fails. Strict return precautions discussed.

## 2024-04-15 VITALS
DIASTOLIC BLOOD PRESSURE: 75 MMHG | HEART RATE: 96 BPM | SYSTOLIC BLOOD PRESSURE: 106 MMHG | RESPIRATION RATE: 18 BRPM | OXYGEN SATURATION: 96 %

## 2024-04-15 LAB
APAP SERPL-MCNC: <5 UG/ML — LOW (ref 10–30)
APPEARANCE UR: CLEAR — SIGNIFICANT CHANGE UP
BASE EXCESS BLDV CALC-SCNC: -2.3 MMOL/L — LOW (ref -2–3)
BASE EXCESS BLDV CALC-SCNC: -2.5 MMOL/L — LOW (ref -2–3)
BILIRUB UR-MCNC: NEGATIVE — SIGNIFICANT CHANGE UP
CA-I SERPL-SCNC: 1.2 MMOL/L — SIGNIFICANT CHANGE UP (ref 1.15–1.33)
CA-I SERPL-SCNC: 1.22 MMOL/L — SIGNIFICANT CHANGE UP (ref 1.15–1.33)
COLOR SPEC: YELLOW — SIGNIFICANT CHANGE UP
DIFF PNL FLD: NEGATIVE — SIGNIFICANT CHANGE UP
GAS PNL BLDV: 132 MMOL/L — LOW (ref 136–145)
GAS PNL BLDV: 137 MMOL/L — SIGNIFICANT CHANGE UP (ref 136–145)
GAS PNL BLDV: SIGNIFICANT CHANGE UP
GAS PNL BLDV: SIGNIFICANT CHANGE UP
GLUCOSE UR QL: NEGATIVE MG/DL — SIGNIFICANT CHANGE UP
HCO3 BLDV-SCNC: 25 MMOL/L — SIGNIFICANT CHANGE UP (ref 22–29)
HCO3 BLDV-SCNC: 26 MMOL/L — SIGNIFICANT CHANGE UP (ref 22–29)
HCT VFR BLDA CALC: 52 % — HIGH (ref 39–51)
HGB BLD CALC-MCNC: 17.3 G/DL — SIGNIFICANT CHANGE UP (ref 12.6–17.4)
KETONES UR-MCNC: NEGATIVE MG/DL — SIGNIFICANT CHANGE UP
LACTATE BLDV-MCNC: 2.7 MMOL/L — HIGH (ref 0.5–2)
LACTATE BLDV-MCNC: 2.9 MMOL/L — HIGH (ref 0.5–2)
LEUKOCYTE ESTERASE UR-ACNC: NEGATIVE — SIGNIFICANT CHANGE UP
LIDOCAIN IGE QN: 38 U/L — SIGNIFICANT CHANGE UP (ref 7–60)
NITRITE UR-MCNC: NEGATIVE — SIGNIFICANT CHANGE UP
PCO2 BLDV: 54 MMHG — SIGNIFICANT CHANGE UP (ref 42–55)
PCO2 BLDV: 59 MMHG — HIGH (ref 42–55)
PH BLDV: 7.26 — LOW (ref 7.32–7.43)
PH BLDV: 7.28 — LOW (ref 7.32–7.43)
PH UR: 5.5 — SIGNIFICANT CHANGE UP (ref 5–8)
PO2 BLDV: 40 MMHG — SIGNIFICANT CHANGE UP (ref 25–45)
PO2 BLDV: 43 MMHG — SIGNIFICANT CHANGE UP (ref 25–45)
POTASSIUM BLDV-SCNC: 3.1 MMOL/L — LOW (ref 3.5–5.1)
POTASSIUM BLDV-SCNC: 4.7 MMOL/L — SIGNIFICANT CHANGE UP (ref 3.5–5.1)
PROT UR-MCNC: NEGATIVE MG/DL — SIGNIFICANT CHANGE UP
SALICYLATES SERPL-MCNC: <0.3 MG/DL — LOW (ref 4–30)
SAO2 % BLDV: 60.1 % — LOW (ref 67–88)
SAO2 % BLDV: 69.6 % — SIGNIFICANT CHANGE UP (ref 67–88)
SP GR SPEC: >1.03 — HIGH (ref 1–1.03)
UROBILINOGEN FLD QL: 0.2 MG/DL — SIGNIFICANT CHANGE UP (ref 0.2–1)

## 2024-04-15 RX ORDER — NALOXONE HYDROCHLORIDE 4 MG/.1ML
0.04 SPRAY NASAL ONCE
Refills: 0 | Status: COMPLETED | OUTPATIENT
Start: 2024-04-15 | End: 2024-04-15

## 2024-04-15 RX ORDER — MAGNESIUM SULFATE 500 MG/ML
2 VIAL (ML) INJECTION ONCE
Refills: 0 | Status: COMPLETED | OUTPATIENT
Start: 2024-04-15 | End: 2024-04-15

## 2024-04-15 RX ADMIN — Medication 25 GRAM(S): at 00:50

## 2024-04-15 RX ADMIN — NALOXONE HYDROCHLORIDE 0.04 MILLIGRAM(S): 4 SPRAY NASAL at 08:26
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
## 2024-04-15 NOTE — CONSULT NOTE ADULT - ASSESSMENT
48M with unknown PMHx found outside with age indeterminate fx through X7 vertebral body    PLAN   - No acute neurosurgical intervention   - Hard collar for now as unable to perform neuro exam and assess pain   - Can follow up outpatient PRN   - Discussed case with attending  48M with unknown PMHx with C3-C4 Anterior & Posterior cervical fusion, found outside with age indeterminate fx through C7 vertebral body    PLAN   - No acute neurosurgical intervention   - Hard collar for now as unable to perform neuro exam and assess pain   - Can follow up outpatient PRN   - Discussed case with attending

## 2024-04-15 NOTE — ED ADULT NURSE NOTE - NSFALLHARMRISKINTERV_ED_ALL_ED
Assistance OOB with selected safe patient handling equipment if applicable/Assistance with ambulation/Communicate risk of Fall with Harm to all staff, patient, and family/Monitor gait and stability/Monitor for mental status changes and reorient to person, place, and time, as needed/Provide visual cue: red socks, yellow wristband, yellow gown, etc/Reinforce activity limits and safety measures with patient and family/Toileting schedule using arm’s reach rule for commode and bathroom/Use of alarms - bed, stretcher, chair and/or video monitoring/Bed in lowest position, wheels locked, appropriate side rails in place/Call bell, personal items and telephone in reach/Instruct patient to call for assistance before getting out of bed/chair/stretcher/Non-slip footwear applied when patient is off stretcher/[place] to call system/Physically safe environment - no spills, clutter or unnecessary equipment/Purposeful Proactive Rounding/Room/bathroom lighting operational, light cord in reach

## 2024-04-15 NOTE — CONSULT NOTE ADULT - SUBJECTIVE AND OBJECTIVE BOX
TRAUMA ACTIVATION LEVEL:  CONSULT  ACTIVATED BY: ED  INTUBATED: NO    MECHANISM OF INJURY:   [x] Unknown  [] Blunt     [] MVC	  [] Fall	  [] Pedestrian Struck	  [] Motorcycle     [] Assault     [] Bicycle collision    [] Sports injury    [] Penetrating    [] Gun Shot Wound      [] Stab Wound    HPI: ~48 year old male with unknown past medical history seen as a trauma consult after being found on the ground outside, unclear if patient was on drugs or had any trauma. Brought to ambulance bay at which point patient required chemical and physical restraint 2/2 severe agitation and combativeness. Patient pan scan with age-indeterminate C7 fracture and chronic maxillofacial fractures, trauma consult placed for further evaluation given the uncertainty of mechanism. Unable to perform trauma assessment in the ED due to sedation.     PAST MEDICAL & SURGICAL HISTORY:  Unknown    Allergies  Allergy Status Unknown    Home Medications:  Unknown    ROS: 10-system review is otherwise negative except HPI above.      Primary Survey:    A - airway intact  B - bilateral breath sounds and good chest rise  C - palpable pulses in all extremities  D - Patient sedated, unable to determine GCS    Vital Signs Last 24 Hrs  T(C): 36 (14 Apr 2024 22:38), Max: 36 (14 Apr 2024 22:38)  T(F): 96.8 (14 Apr 2024 22:38), Max: 96.8 (14 Apr 2024 22:38)  HR: 91 (14 Apr 2024 22:38) (91 - 91)  BP: 103/58 (14 Apr 2024 22:38) (103/58 - 103/58)  BP(mean): 75 (14 Apr 2024 22:38) (75 - 75)  RR: 20 (14 Apr 2024 22:38) (20 - 20)  SpO2: 97% (14 Apr 2024 22:38) (97% - 97%)    Parameters below as of 14 Apr 2024 22:38  Patient On (Oxygen Delivery Method): nasal cannula  O2 Flow (L/min): 6      Secondary Survey:   *Exam deferred given that patient is sedated and unresponsive/unarousable, unable to participate in exam*    Labs:  CAPILLARY BLOOD GLUCOSE    POCT Blood Glucose.: 87 mg/dL (14 Apr 2024 23:47)                        15.1   8.50  )-----------( 168      ( 14 Apr 2024 23:10 )             43.7       Auto Neutrophil %: 50.4 % (04-14-24 @ 23:10)  Auto Immature Granulocyte %: 0.2 % (04-14-24 @ 23:10)    04-14    145  |  107  |  16  ----------------------------<  88  4.0   |  24  |  0.9    Calcium: 9.3 mg/dL (04-14-24 @ 23:10)    LFTs:             7.4  | 0.4  | 32       ------------------[77      ( 14 Apr 2024 23:10 )  4.6  | x    | 15          Lipase:38     Amylase:x        Blood Gas Venous - Lactate: 2.9 mmol/L (04-15-24 @ 00:37)    Coags:    Alcohol, Blood: 322 mg/dL (04-14-24 @ 23:10)    Urinalysis Basic - ( 14 Apr 2024 23:10 )    Color: x / Appearance: x / SG: x / pH: x  Gluc: 88 mg/dL / Ketone: x  / Bili: x / Urobili: x   Blood: x / Protein: x / Nitrite: x   Leuk Esterase: x / RBC: x / WBC x   Sq Epi: x / Non Sq Epi: x / Bacteria: x    Alcohol, Blood: 322 mg/dL (04-14-24 @ 23:10)    RADIOLOGY & ADDITIONAL STUDIES:  < from: CT Head No Cont (04.14.24 @ 23:54) >  IMPRESSION:    CT HEAD:  No acute intracranial pathology.    CT FACIAL BONES:  1.  Age-indeterminate nondisplaced left anterior maxillary wall fracture.   Favor chronic chronicity given lack of surrounding acute soft tissue   changes.  2.  Right facial osseous deformities, including anterior and posterior   maxillary wall and zygomatic arch deformities, likely to represent   chronic fractures.  3.  Chronic comminuted fractures of the bilateral nasal bones and frontal   processes.  4.  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    CT CERVICAL SPINE:  Age-indeterminate fracture through the anterior C7 vertebral body. No   facet subluxation.    < end of copied text >  < from: CT Chest w/ IV Cont (04.14.24 @ 23:56) >  IMPRESSION:    No evidence of an acute traumatic solid organ or osseous injury.    Mild central airways debris/secretions. Right middle lobe ill-defined   consolidative opacities may be post-infectious/inflammatory or   atelectatic.    < end of copied text >    ---------------------------------------------------------------------------------------    ASSESSMENT:  48y Male  w/ PMHx of *** seen as (Code Trauma / Trauma Alert / Trauma Consult) s/p ****** with complaint of *** , external signs of trauma include *** . Trauma assessment in ED: ABCs intact , GCS 15 , AAOx3,  LEIGH.     Injuries identified:   -   -   -     PLAN:   - Trauma Labs: (CBC, BMP, Coags, T&S, UA, EtOH level)  Additional studies:  EKG  Utox    Trauma Imaging to include the following:  - CXR, Pelvic Xray  - CT Head,  CT C-spine, CT Max/Face, CT Chest, CT Abd/Pelvis  - Extremity films: None    Additional consultations:  - Neurosurgery  - Orthopedics  - OMFS  - PT/Rehab/SW  - Hospitalist/Medicine     Disposition pending results of above labs and imaging  Above plan discussed with Trauma attending,  ***  , patient, patient family, and ED team  --------------------------------------------------------------------------------------  04-15-24 @ 02:26 TRAUMA ACTIVATION LEVEL:  CONSULT  ACTIVATED BY: ED  INTUBATED: NO    MECHANISM OF INJURY:   [x] Unknown  [] Blunt     [] MVC	  [] Fall	  [] Pedestrian Struck	  [] Motorcycle     [] Assault     [] Bicycle collision    [] Sports injury    [] Penetrating    [] Gun Shot Wound      [] Stab Wound    HPI: ~48 year old male with unknown past medical history seen as a trauma consult after being found on the ground outside, unclear if patient was on drugs or had any trauma. Brought to ambulance bay at which point patient required chemical and physical restraint 2/2 severe agitation and combativeness. Patient pan scan with age-indeterminate C7 fracture and chronic maxillofacial fractures, trauma consult placed for further evaluation given the uncertainty of mechanism. Unable to perform trauma assessment in the ED due to sedation.     PAST MEDICAL & SURGICAL HISTORY:  Unknown    Allergies  Allergy Status Unknown    Home Medications:  Unknown    ROS: 10-system review is otherwise negative except HPI above.      Primary Survey:    A - airway intact  B - bilateral breath sounds and good chest rise  C - palpable pulses in all extremities  D - Patient sedated, unable to determine GCS    Vital Signs Last 24 Hrs  T(C): 36 (14 Apr 2024 22:38), Max: 36 (14 Apr 2024 22:38)  T(F): 96.8 (14 Apr 2024 22:38), Max: 96.8 (14 Apr 2024 22:38)  HR: 91 (14 Apr 2024 22:38) (91 - 91)  BP: 103/58 (14 Apr 2024 22:38) (103/58 - 103/58)  BP(mean): 75 (14 Apr 2024 22:38) (75 - 75)  RR: 20 (14 Apr 2024 22:38) (20 - 20)  SpO2: 97% (14 Apr 2024 22:38) (97% - 97%)    Parameters below as of 14 Apr 2024 22:38  Patient On (Oxygen Delivery Method): nasal cannula  O2 Flow (L/min): 6      Secondary Survey:   *Exam deferred given that patient is sedated and unresponsive/unarousable, unable to participate in exam*    Labs:  CAPILLARY BLOOD GLUCOSE    POCT Blood Glucose.: 87 mg/dL (14 Apr 2024 23:47)                        15.1   8.50  )-----------( 168      ( 14 Apr 2024 23:10 )             43.7       Auto Neutrophil %: 50.4 % (04-14-24 @ 23:10)  Auto Immature Granulocyte %: 0.2 % (04-14-24 @ 23:10)    04-14    145  |  107  |  16  ----------------------------<  88  4.0   |  24  |  0.9    Calcium: 9.3 mg/dL (04-14-24 @ 23:10)    LFTs:             7.4  | 0.4  | 32       ------------------[77      ( 14 Apr 2024 23:10 )  4.6  | x    | 15          Lipase:38     Amylase:x        Blood Gas Venous - Lactate: 2.9 mmol/L (04-15-24 @ 00:37)    Coags:    Alcohol, Blood: 322 mg/dL (04-14-24 @ 23:10)    Urinalysis Basic - ( 14 Apr 2024 23:10 )    Color: x / Appearance: x / SG: x / pH: x  Gluc: 88 mg/dL / Ketone: x  / Bili: x / Urobili: x   Blood: x / Protein: x / Nitrite: x   Leuk Esterase: x / RBC: x / WBC x   Sq Epi: x / Non Sq Epi: x / Bacteria: x    Alcohol, Blood: 322 mg/dL (04-14-24 @ 23:10)    RADIOLOGY & ADDITIONAL STUDIES:  < from: CT Head No Cont (04.14.24 @ 23:54) >  IMPRESSION:    CT HEAD:  No acute intracranial pathology.    CT FACIAL BONES:  1.  Age-indeterminate nondisplaced left anterior maxillary wall fracture.   Favor chronic chronicity given lack of surrounding acute soft tissue   changes.  2.  Right facial osseous deformities, including anterior and posterior   maxillary wall and zygomatic arch deformities, likely to represent   chronic fractures.  3.  Chronic comminuted fractures of the bilateral nasal bones and frontal   processes.  4.  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    CT CERVICAL SPINE:  Age-indeterminate fracture through the anterior C7 vertebral body. No   facet subluxation.    < end of copied text >  < from: CT Chest w/ IV Cont (04.14.24 @ 23:56) >  IMPRESSION:    No evidence of an acute traumatic solid organ or osseous injury.    Mild central airways debris/secretions. Right middle lobe ill-defined   consolidative opacities may be post-infectious/inflammatory or   atelectatic.    < end of copied text >    ---------------------------------------------------------------------------------------

## 2024-04-15 NOTE — CONSULT NOTE ADULT - ATTENDING COMMENTS
Patient seen at 2:00 am    ~48 year old male with unknown past medical history seen as a trauma consult after being found on the ground outside, unclear if patient was on drugs or had any trauma. Brought to ambulance bay at which point patient required chemical and physical restraint 2/2 severe agitation and combativeness. Patient pan scan with age-indeterminate C7 fracture and chronic maxillofacial fractures, trauma consult placed for further evaluation given the uncertainty of mechanism.    Patient seen and examined s/p sedation, is unresponsive and unable to provide history or participate in exam. Trauma to reevaluate when able to be examined. Neurosurgery consult pending.

## 2024-04-15 NOTE — CONSULT NOTE ADULT - SUBJECTIVE AND OBJECTIVE BOX
NEUROSURGERY CONSULT  [facility]   04-15-24 @ 03:09    HPI: 48-year-old male, unknown past medical history, found on the ground outside, unclear if patient did not drugs, unclear if any trauma.  Brought into the ambulance bay at which point patient was combative, throwing punches at ED staff, spitting and cursing nonsensically.  Seen on arrival at which point patient was placed in full pulm restraints and given chemical restraint with improvement.  Brought to critical care area of the ED.  Unable to provide further history.    Neurosurgery was consulted for unknown male found outside with CT with age indeterminate fx through X7 vertebral body. Patient was excessively combative and aggressively requiring full restraints & requiring IV haldol & versed. Was seen by ED staff [email or web address]. Unable to obtain neuro exam due to sedation.     RADIOLOGY:   < from: CT Cervical Spine No Cont (04.14.24 @ 23:55) >  IMPRESSION:  CT HEAD:  No acute intracranial pathology.    CT FACIAL BONES:  1.  Age-indeterminate nondisplaced left anterior maxillary wall fracture.   Favor chronic chronicity given lack of surrounding acute soft tissue   changes.  2.  Right facial osseous deformities, including anterior and posterior   maxillary wall and zygomatic arch deformities, likely to represent   chronic fractures.  3.  Chronic comminuted fractures of the bilateral nasal bones and frontal   processes.  4.  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    CT CERVICAL SPINE:  Age-indeterminate fracture through the anterior C7 vertebral body. No   facet subluxation.      PAST MEDICAL & SURGICAL HISTORY:  FAMILY HISTORY:  SOCIAL HISTORY:  Tobacco Use:  EtOH use:   Substance:    Allergies  Allergy Status Unknown  Intolerances    [X] A ten-point review of systems is negative except as noted   [  ] Due to altered mental status/intubation, subjective information were not able to be obtained from the patient. History was obtained, to the extent possible, from review of the chart and collateral sources of information    MEDS:      PHYSICAL EXAM:  Unable to perform neuro exam due to sedation due to aggressive behavior*    Vital Signs Last 24 Hrs  T(C): 36 (14 Apr 2024 22:38), Max: 36 (14 Apr 2024 22:38)  T(F): 96.8 (14 Apr 2024 22:38), Max: 96.8 (14 Apr 2024 22:38)  HR: 91 (14 Apr 2024 22:38) (91 - 91)  BP: 103/58 (14 Apr 2024 22:38) (103/58 - 103/58)  BP(mean): 75 (14 Apr 2024 22:38) (75 - 75)  RR: 20 (14 Apr 2024 22:38) (20 - 20)  SpO2: 97% (14 Apr 2024 22:38) (97% - 97%)    Parameters below as of 14 Apr 2024 22:38  Patient On (Oxygen Delivery Method): nasal cannula  O2 Flow (L/min): 6        LABS:                        15.1   8.50  )-----------( 168      ( 14 Apr 2024 23:10 )             43.7     04-14    145  |  107  |  16  ----------------------------<  88  4.0   |  24  |  0.9    Ca    9.3      14 Apr 2024 23:10    TPro  7.4  /  Alb  4.6  /  TBili  0.4  /  DBili  x   /  AST  32  /  ALT  15  /  AlkPhos  77  04-14               NEUROSURGERY CONSULT  [facility]   04-15-24 @ 03:09    HPI: 48-year-old male, unknown past medical history, found on the ground outside, unclear if patient did not drugs, unclear if any trauma.  Brought into the ambulance bay at which point patient was combative, throwing punches at ED staff, spitting and cursing nonsensically.  Seen on arrival at which point patient was placed in full pulm restraints and given chemical restraint with improvement.  Brought to critical care area of the ED.  Unable to provide further history.    Neurosurgery was consulted for unknown male found outside with CT with age indeterminate fx through C7 vertebral body. Patient was excessively combative and aggressively requiring full restraints & requiring IV haldol & versed. Was seen by ED staff [email or web address]. Unable to obtain neuro exam due to sedation.     RADIOLOGY:   < from: CT Cervical Spine No Cont (04.14.24 @ 23:55) >  IMPRESSION:  CT HEAD:  No acute intracranial pathology.    CT FACIAL BONES:  1.  Age-indeterminate nondisplaced left anterior maxillary wall fracture.   Favor chronic chronicity given lack of surrounding acute soft tissue   changes.  2.  Right facial osseous deformities, including anterior and posterior   maxillary wall and zygomatic arch deformities, likely to represent   chronic fractures.  3.  Chronic comminuted fractures of the bilateral nasal bones and frontal   processes.  4.  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    CT CERVICAL SPINE:  Age-indeterminate fracture through the anterior C7 vertebral body. No   facet subluxation.      PAST MEDICAL & SURGICAL HISTORY:  FAMILY HISTORY:  SOCIAL HISTORY:  Tobacco Use:  EtOH use:   Substance:    Allergies  Allergy Status Unknown  Intolerances    [X] A ten-point review of systems is negative except as noted   [  ] Due to altered mental status/intubation, subjective information were not able to be obtained from the patient. History was obtained, to the extent possible, from review of the chart and collateral sources of information    MEDS:      PHYSICAL EXAM:  Unable to perform neuro exam due to sedation due to aggressive behavior*    Vital Signs Last 24 Hrs  T(C): 36 (14 Apr 2024 22:38), Max: 36 (14 Apr 2024 22:38)  T(F): 96.8 (14 Apr 2024 22:38), Max: 96.8 (14 Apr 2024 22:38)  HR: 91 (14 Apr 2024 22:38) (91 - 91)  BP: 103/58 (14 Apr 2024 22:38) (103/58 - 103/58)  BP(mean): 75 (14 Apr 2024 22:38) (75 - 75)  RR: 20 (14 Apr 2024 22:38) (20 - 20)  SpO2: 97% (14 Apr 2024 22:38) (97% - 97%)    Parameters below as of 14 Apr 2024 22:38  Patient On (Oxygen Delivery Method): nasal cannula  O2 Flow (L/min): 6        LABS:                        15.1   8.50  )-----------( 168      ( 14 Apr 2024 23:10 )             43.7     04-14    145  |  107  |  16  ----------------------------<  88  4.0   |  24  |  0.9    Ca    9.3      14 Apr 2024 23:10    TPro  7.4  /  Alb  4.6  /  TBili  0.4  /  DBili  x   /  AST  32  /  ALT  15  /  AlkPhos  77  04-14

## 2024-04-16 PROBLEM — F10.10 ALCOHOL ABUSE, UNCOMPLICATED: Chronic | Status: ACTIVE | Noted: 2023-11-29

## 2024-04-16 LAB
CULTURE RESULTS: SIGNIFICANT CHANGE UP
SPECIMEN SOURCE: SIGNIFICANT CHANGE UP

## 2024-04-19 LAB
AMPHET UR-MCNC: NEGATIVE NG/ML — SIGNIFICANT CHANGE UP
BARBITURATES UR QL SCN: NEGATIVE NG/ML — SIGNIFICANT CHANGE UP
BARBITURATES UR-MCNC: NEGATIVE NG/ML — SIGNIFICANT CHANGE UP
BENZODIAZ UR-MCNC: SIGNIFICANT CHANGE UP NG/ML
COCAINE METAB.OTHER UR-MCNC: SIGNIFICANT CHANGE UP NG/ML
CREATININE, URINE THERAPEUTIC: 58.8 MG/DL — SIGNIFICANT CHANGE UP (ref 20–300)
FENTANYL RESULT, UR: NEGATIVE NG/ML — SIGNIFICANT CHANGE UP
FENTANYL UR QL SCN: NEGATIVE NG/ML — SIGNIFICANT CHANGE UP
Lab: SIGNIFICANT CHANGE UP
METHADONE UR-MCNC: NEGATIVE NG/ML — SIGNIFICANT CHANGE UP
OPIATES UR-MCNC: NEGATIVE NG/ML — SIGNIFICANT CHANGE UP
OXYCODONE UR QL SCN: NEGATIVE NG/ML — SIGNIFICANT CHANGE UP
PCP UR-MCNC: NEGATIVE NG/ML — SIGNIFICANT CHANGE UP
PH, URINE RESULT: 4.8 — SIGNIFICANT CHANGE UP (ref 4.5–8.9)
THC UR QL: NEGATIVE NG/ML — SIGNIFICANT CHANGE UP

## 2024-04-27 NOTE — PATIENT PROFILE BEHAVIORAL HEALTH - PACKS PER DAY
Patient reports she was answering the door for a  and fell. Possibly striking right chest on stool. Did not hit head. No LOC. Not on blood thinners. Now reports right sided rib pain/chest pain. Pain worse with laughing and inspiration.      Triage Assessment (Adult)       Row Name 04/27/24 1823          Triage Assessment    Airway WDL WDL        Respiratory WDL    Respiratory WDL X;rhythm/pattern  Pain with inspiration        Cardiac WDL    Cardiac WDL WDL        Peripheral/Neurovascular WDL    Peripheral Neurovascular WDL WDL        Cognitive/Neuro/Behavioral WDL    Cognitive/Neuro/Behavioral WDL WDL                     
1

## 2025-01-24 NOTE — ED ADULT NURSE REASSESSMENT NOTE - NS ED NURSE REASSESS COMMENT FT1
Addendum to chart for constant observation flow sheet RN assessment - 04/05/2022 @  06:35AM 1:1 initiated , patient having hallucinations, & at risk harm to self . Belongings given to security, patient placed in gown. none

## 2025-04-29 NOTE — ED BEHAVIORAL HEALTH ASSESSMENT NOTE - EYE CONTACT
As we discussed, we did not find any significant evidence for fatty liver or liver enlargement.  Your liver tests were slightly abnormal and it is important for you to get those reevaluated in 7 to 10 days.    Please return sooner with any fevers, vomiting, difficulty breathing, worsening symptoms, or additional concerns.   Poor

## 2025-06-02 NOTE — PATIENT PROFILE ADULT - NSPROPTRIGHTNOTIFY_GEN_A_NUR
Subjective   Bobby Rodriguez is a 30 y.o. male.   Chief Complaint   Patient presents with    Hypertension    Hyperlipidemia     Diabetes  Visit type:  Follow-up  Diabetes type:  Type 2  Disease course:  Worsening  Associated symptoms:     no chest pain, no fatigue, no polyphagia, no polyuria and no weight loss    Symptom course:  Stable  Hyperlipidemia  This is a chronic problem. The current episode started more than 1 year ago. The problem is uncontrolled. Pertinent negatives include no chest pain, myalgias or shortness of breath.   Hypertension  Chronicity:  Chronic  Onset:  More than 1 year ago  Associated symptoms: no chest pain, no palpitations and no shortness of breath         The following portions of the patient's history were reviewed and updated as appropriate: allergies, current medications, past family history, past medical history, past social history, past surgical history, and problem list.    Past Medical History:   Diagnosis Date    Hyperlipidemia     Hypertension        Past Surgical History:   Procedure Laterality Date    EXCISION MASS LEG  2009    [facility] at [facility]     SPINE SURGERY  2013    Lumbar; Dr. Bell    TONSILLECTOMY  07/30/1999    Dr. Hyman        Family History   Problem Relation Age of Onset    Diabetes Father     Hyperlipidemia Father     Hypertension Father     Cancer Maternal Grandmother         Pancreatic at 82    Heart disease Maternal Grandfather     Cancer Paternal Grandfather         Pancreatic at 73        Social History     Socioeconomic History    Marital status: Single   Tobacco Use    Smoking status: Never    Smokeless tobacco: Current     Types: Chew    Tobacco comments:     1 can every 2 weeks   Vaping Use    Vaping status: Never Used   Substance and Sexual Activity    Alcohol use: Yes     Alcohol/week: 15.0 standard drinks of alcohol     Types: 12 Cans of beer, 3 Standard drinks or equivalent per week    Drug use: Never    Sexual activity: Yes     Partners: Female      "Birth control/protection: Condom, Pill       Outpatient Medications Prior to Visit   Medication Sig Dispense Refill    citalopram (CeleXA) 20 MG tablet Take 1 tablet by mouth Daily. 90 tablet 2    dapagliflozin Propanediol (Farxiga) 10 MG tablet Take 10 mg by mouth Daily. 90 tablet 3    glipizide (glipiZIDE XL) 5 MG ER tablet Take 1 tablet by mouth Daily. 90 tablet 3    lisinopril (PRINIVIL,ZESTRIL) 10 MG tablet Take 1 tablet by mouth Daily.      lovastatin (MEVACOR) 20 MG tablet Take 1 tablet by mouth Every Night.      metFORMIN ER (GLUCOPHAGE-XR) 500 MG 24 hr tablet Take 1 tablet by mouth 2 (Two) Times a Day. 180 tablet 3     No facility-administered medications prior to visit.        Review of Systems   Constitutional:  Negative for fatigue, unexpected weight gain and unexpected weight loss.   Eyes:  Negative for visual disturbance.   Respiratory:  Negative for shortness of breath.    Cardiovascular:  Negative for chest pain, palpitations and leg swelling.   Gastrointestinal:  Negative for nausea.   Endocrine: Negative for polyphagia and polyuria.   Genitourinary:  Negative for frequency.   Musculoskeletal:  Negative for myalgias.   Skin:  Negative for dry skin and skin lesions.   Neurological:  Negative for syncope, numbness and headache.       Objective   Visit Vitals  /98 (BP Location: Right arm, Patient Position: Sitting, Cuff Size: Adult)   Pulse 119   Temp 96.6 °F (35.9 °C) (Temporal)   Resp 19   Ht 188 cm (74\")   Wt (!) 152 kg (335 lb)   SpO2 98% Comment: ra   BMI 43.01 kg/m²     Physical Exam  Vitals and nursing note reviewed.   Constitutional:       Appearance: He is well-developed.   HENT:      Head: Normocephalic.   Neck:      Thyroid: No thyromegaly.      Vascular: No carotid bruit.      Trachea: Trachea normal.   Cardiovascular:      Rate and Rhythm: Normal rate and regular rhythm.      Heart sounds: No murmur heard.     No friction rub. No gallop.   Pulmonary:      Effort: Pulmonary effort is " normal. No respiratory distress.      Breath sounds: Normal breath sounds. No wheezing.   Chest:      Chest wall: No tenderness.   Musculoskeletal:      Cervical back: Neck supple.   Skin:     General: Skin is dry.      Findings: No rash.      Nails: There is no clubbing.   Neurological:      Mental Status: He is alert and oriented to person, place, and time.   Psychiatric:         Behavior: Behavior is cooperative.         Assessment & Plan   Problem List Items Addressed This Visit          High    Uncontrolled type 2 diabetes mellitus with hyperglycemia - Primary    Overview   Diabetic foot exam done 3- and WNL.  Patient has promised to walk for 30 minutes daily.    12- dx  Farxiga is not covered by insurance and cost pt. $800 per month which he has been paying cash    MAX DOSE Wegovy 2.4 ml weekly  MAX DOSE Farxiga 25 mg daily.         Current Assessment & Plan   Worsening. Hgb A1c 10.0 up from 8.3  Encouraged to watch sugar intake, exercise more and lose weight.   compliant with medication. Patient tolerated glipizide, metformin Farixa well without side effects. I feel the benefits of the medication outweigh the risks.    Not monitoring sugar at home.  He will try and check blood sugars more frequently on his own and we will order a concha sensor  Follow up in 2 months  Care management needs are self-addressed.  Self-management abilities addressed and patient is capable of managing his own disease.           Relevant Medications    Continuous Glucose  (Dexcom G7 ) device    Continuous Glucose Sensor (Dexcom G7 Sensor) misc    Semaglutide,0.25 or 0.5MG/DOS, (Ozempic, 0.25 or 0.5 MG/DOSE,) 2 MG/1.5ML solution pen-injector    Other Relevant Orders    Hemoglobin A1c       Medium    Abnormal liver enzymes    Current Assessment & Plan   Will due to increased alcohol use.  He states he will cut down.         Anxiety    Current Assessment & Plan   Slightly worse due to stress at work.  He  does tolerate the Celexa 20 fairly well.  Benefits outweigh the risk         Diabetic nephropathy    Overview   Negative 30th 2025 microalbumin to creatinine ratio is normal x 1         Current Assessment & Plan            Relevant Medications    Semaglutide,0.25 or 0.5MG/DOS, (Ozempic, 0.25 or 0.5 MG/DOSE,) 2 MG/1.5ML solution pen-injector    Hypertension    Current Assessment & Plan   Worsening control; Encouraged to watch salt, exercise more and lose weight.  Patient tolerated lisinopril well without side effects. I feel the benefits of the medication outweigh the risks.           Mixed hyperlipidemia    Current Assessment & Plan   Worsening    Encouraged to watch fatty intake, exercise more, and lose weight.   compliant with medication  Patient tolerated lovastatin well without side effects. I feel the benefits of the medication outweigh the risks.    Is not getting adequate diet and exercise  Goals developed at last visit were not met   Follow up in 2  months  Care management needs are self-addressed.  Self-management abilities addressed and patient is capable of managing his own disease.           Relevant Orders    Comprehensive Metabolic Panel    Lipid Panel       Low    Class 3 severe obesity due to excess calories with serious comorbidity and body mass index (BMI) of 40.0 to 44.9 in adult    Overview   Diet exercise and wt loss encouraged consequences of obesity discussed          Current Assessment & Plan   Patient's (Body mass index is 43.01 kg/m².) indicates that they are morbidly/severely obese (BMI > 40 or > 35 with obesity - related health condition) with health conditions that include diabetes mellitus . Weight is unchanged. BMI  is above average; BMI management plan is completed. We discussed low calorie, low carb based diet program, portion control, and increasing exercise.             Unprioritized    Polycythemia    Current Assessment & Plan   Slowly improving repeat with next labs          declines